# Patient Record
Sex: MALE | Race: WHITE | NOT HISPANIC OR LATINO | Employment: OTHER | URBAN - METROPOLITAN AREA
[De-identification: names, ages, dates, MRNs, and addresses within clinical notes are randomized per-mention and may not be internally consistent; named-entity substitution may affect disease eponyms.]

---

## 2023-03-31 DIAGNOSIS — F41.8 OTHER SPECIFIED ANXIETY DISORDERS: ICD-10-CM

## 2023-03-31 DIAGNOSIS — E78.2 MIXED HYPERLIPIDEMIA: ICD-10-CM

## 2023-03-31 RX ORDER — ATORVASTATIN CALCIUM 20 MG/1
TABLET, FILM COATED ORAL
Qty: 90 TABLET | Refills: 1 | Status: SHIPPED | OUTPATIENT
Start: 2023-03-31 | End: 2023-06-27 | Stop reason: SINTOL

## 2023-03-31 RX ORDER — SERTRALINE HYDROCHLORIDE 100 MG/1
TABLET, FILM COATED ORAL
Qty: 90 TABLET | Refills: 1 | Status: SHIPPED | OUTPATIENT
Start: 2023-03-31 | End: 2023-07-05

## 2023-06-19 DIAGNOSIS — I10 PRIMARY HYPERTENSION: Primary | ICD-10-CM

## 2023-06-19 RX ORDER — AMLODIPINE BESYLATE 5 MG/1
TABLET ORAL
Qty: 90 TABLET | Refills: 1 | Status: SHIPPED | OUTPATIENT
Start: 2023-06-19 | End: 2023-10-31 | Stop reason: SDUPTHER

## 2023-06-19 RX ORDER — LISINOPRIL 20 MG/1
TABLET ORAL
Qty: 90 TABLET | Refills: 1 | Status: SHIPPED | OUTPATIENT
Start: 2023-06-19 | End: 2024-02-01

## 2023-06-24 LAB — PROSTATE SPECIFIC AG (NG/ML) IN SER/PLAS: <0.1 NG/ML (ref 0–4)

## 2023-06-26 PROBLEM — E78.2 HYPERLIPIDEMIA, MIXED: Status: ACTIVE | Noted: 2023-06-26

## 2023-06-26 PROBLEM — I10 ESSENTIAL HYPERTENSION: Status: ACTIVE | Noted: 2023-06-26

## 2023-06-26 PROBLEM — T84.84XA PAIN DUE TO LEFT HIP JOINT PROSTHESIS (CMS-HCC): Status: ACTIVE | Noted: 2023-06-26

## 2023-06-26 PROBLEM — R97.20 ELEVATED PSA: Status: ACTIVE | Noted: 2023-06-26

## 2023-06-26 PROBLEM — Z96.642 PAIN DUE TO LEFT HIP JOINT PROSTHESIS (CMS-HCC): Status: ACTIVE | Noted: 2023-06-26

## 2023-06-26 PROBLEM — S12.9XXA CERVICAL SPINE FRACTURE (MULTI): Status: ACTIVE | Noted: 2023-06-26

## 2023-06-26 PROBLEM — N40.1 BENIGN PROSTATIC HYPERPLASIA WITH NOCTURIA: Status: ACTIVE | Noted: 2023-06-26

## 2023-06-26 PROBLEM — H61.21 IMPACTED CERUMEN OF RIGHT EAR: Status: ACTIVE | Noted: 2023-06-26

## 2023-06-26 PROBLEM — R53.1 WEAKNESS GENERALIZED: Status: ACTIVE | Noted: 2023-06-26

## 2023-06-26 PROBLEM — H91.90 DECREASED HEARING: Status: ACTIVE | Noted: 2023-06-26

## 2023-06-26 PROBLEM — E55.9 VITAMIN D DEFICIENCY: Status: ACTIVE | Noted: 2023-06-26

## 2023-06-26 PROBLEM — H91.91 HEARING LOSS OF RIGHT EAR: Status: ACTIVE | Noted: 2023-06-26

## 2023-06-26 PROBLEM — H90.3 SENSORINEURAL HEARING LOSS (SNHL) OF BOTH EARS: Status: ACTIVE | Noted: 2023-06-26

## 2023-06-26 PROBLEM — R29.898 WEAKNESS OF BOTH LOWER EXTREMITIES: Status: ACTIVE | Noted: 2023-06-26

## 2023-06-26 PROBLEM — F41.8 DEPRESSION WITH ANXIETY: Status: ACTIVE | Noted: 2023-06-26

## 2023-06-26 PROBLEM — R29.6 FREQUENT FALLS: Status: ACTIVE | Noted: 2023-06-26

## 2023-06-26 PROBLEM — I63.22: Status: ACTIVE | Noted: 2023-06-26

## 2023-06-26 PROBLEM — N18.31 STAGE 3A CHRONIC KIDNEY DISEASE (MULTI): Status: ACTIVE | Noted: 2023-06-26

## 2023-06-26 PROBLEM — R26.81 GAIT INSTABILITY: Status: ACTIVE | Noted: 2023-06-26

## 2023-06-26 PROBLEM — R35.1 BENIGN PROSTATIC HYPERPLASIA WITH NOCTURIA: Status: ACTIVE | Noted: 2023-06-26

## 2023-06-26 PROBLEM — M54.16 LUMBAR RADICULOPATHY: Status: ACTIVE | Noted: 2023-06-26

## 2023-06-26 PROBLEM — C44.90 SKIN CANCER: Status: ACTIVE | Noted: 2023-06-26

## 2023-06-26 PROBLEM — M54.50 LOW BACK PAIN: Status: ACTIVE | Noted: 2023-06-26

## 2023-06-26 RX ORDER — MIRABEGRON 25 MG/1
25 TABLET, FILM COATED, EXTENDED RELEASE ORAL DAILY
COMMUNITY
Start: 2023-03-29 | End: 2023-10-31 | Stop reason: ALTCHOICE

## 2023-06-26 RX ORDER — TAMSULOSIN HYDROCHLORIDE 0.4 MG/1
2 CAPSULE ORAL DAILY
COMMUNITY
Start: 2022-02-07 | End: 2023-07-05

## 2023-06-26 RX ORDER — TADALAFIL 5 MG/1
5 TABLET ORAL DAILY
COMMUNITY
Start: 2023-04-12 | End: 2023-10-31 | Stop reason: ALTCHOICE

## 2023-06-26 RX ORDER — LUTEIN 6 MG
1 TABLET ORAL DAILY
COMMUNITY
Start: 2022-12-07 | End: 2023-10-31 | Stop reason: ALTCHOICE

## 2023-06-26 RX ORDER — FOLIC ACID 0.8 MG
TABLET ORAL
COMMUNITY
End: 2023-10-31 | Stop reason: ALTCHOICE

## 2023-06-26 RX ORDER — ASPIRIN 81 MG/1
TABLET ORAL
COMMUNITY
End: 2023-10-31 | Stop reason: SDUPTHER

## 2023-06-27 ENCOUNTER — OFFICE VISIT (OUTPATIENT)
Dept: PRIMARY CARE | Facility: CLINIC | Age: 83
End: 2023-06-27
Payer: MEDICARE

## 2023-06-27 VITALS
HEART RATE: 77 BPM | TEMPERATURE: 97.3 F | HEIGHT: 69 IN | WEIGHT: 164 LBS | SYSTOLIC BLOOD PRESSURE: 130 MMHG | OXYGEN SATURATION: 97 % | DIASTOLIC BLOOD PRESSURE: 78 MMHG | BODY MASS INDEX: 24.29 KG/M2

## 2023-06-27 DIAGNOSIS — F41.8 DEPRESSION WITH ANXIETY: ICD-10-CM

## 2023-06-27 DIAGNOSIS — E78.2 HYPERLIPIDEMIA, MIXED: ICD-10-CM

## 2023-06-27 DIAGNOSIS — I10 ESSENTIAL HYPERTENSION: Primary | ICD-10-CM

## 2023-06-27 DIAGNOSIS — N18.31 STAGE 3A CHRONIC KIDNEY DISEASE (MULTI): ICD-10-CM

## 2023-06-27 DIAGNOSIS — F33.42 RECURRENT MAJOR DEPRESSIVE DISORDER, IN FULL REMISSION (CMS-HCC): ICD-10-CM

## 2023-06-27 DIAGNOSIS — D69.6 THROMBOCYTOPENIA, UNSPECIFIED (CMS-HCC): ICD-10-CM

## 2023-06-27 DIAGNOSIS — I63.22: ICD-10-CM

## 2023-06-27 PROBLEM — H61.21 IMPACTED CERUMEN OF RIGHT EAR: Status: RESOLVED | Noted: 2023-06-26 | Resolved: 2023-06-27

## 2023-06-27 PROBLEM — R53.1 WEAKNESS GENERALIZED: Status: RESOLVED | Noted: 2023-06-26 | Resolved: 2023-06-27

## 2023-06-27 PROBLEM — Z96.642 PAIN DUE TO LEFT HIP JOINT PROSTHESIS (CMS-HCC): Status: RESOLVED | Noted: 2023-06-26 | Resolved: 2023-06-27

## 2023-06-27 PROBLEM — T84.84XA PAIN DUE TO LEFT HIP JOINT PROSTHESIS (CMS-HCC): Status: RESOLVED | Noted: 2023-06-26 | Resolved: 2023-06-27

## 2023-06-27 PROBLEM — S12.9XXA CERVICAL SPINE FRACTURE (MULTI): Status: RESOLVED | Noted: 2023-06-26 | Resolved: 2023-06-27

## 2023-06-27 PROBLEM — M54.50 LOW BACK PAIN: Status: RESOLVED | Noted: 2023-06-26 | Resolved: 2023-06-27

## 2023-06-27 PROCEDURE — 1159F MED LIST DOCD IN RCRD: CPT | Performed by: INTERNAL MEDICINE

## 2023-06-27 PROCEDURE — 1160F RVW MEDS BY RX/DR IN RCRD: CPT | Performed by: INTERNAL MEDICINE

## 2023-06-27 PROCEDURE — 3075F SYST BP GE 130 - 139MM HG: CPT | Performed by: INTERNAL MEDICINE

## 2023-06-27 PROCEDURE — 99214 OFFICE O/P EST MOD 30 MIN: CPT | Performed by: INTERNAL MEDICINE

## 2023-06-27 PROCEDURE — 3078F DIAST BP <80 MM HG: CPT | Performed by: INTERNAL MEDICINE

## 2023-06-27 RX ORDER — PSYLLIUM HUSK 0.4 G
5 CAPSULE ORAL 4 TIMES DAILY
COMMUNITY

## 2023-06-27 ASSESSMENT — ENCOUNTER SYMPTOMS
DIZZINESS: 0
WEAKNESS: 1
HYPERTENSION: 1
FATIGUE: 1
SHORTNESS OF BREATH: 0
MYALGIAS: 1

## 2023-06-27 NOTE — ASSESSMENT & PLAN NOTE
Lipids, LDL at goal with statin.  Recommend low fat/cholesterol diet.  ? Side effects,weakness, muscle aches, cramps.  Stop x 30 days.  Call with progress.

## 2023-06-27 NOTE — PROGRESS NOTES
"Subjective   Patient ID: Levon Mckay is a 83 y.o. male who presents for chronic medical problems and Follow-up (4 month follow up with labs).    Completed XRT for prostate cancer, mets to right iliac bone.  Last lupron injection 2-7-2023.  PSA < 0.10.  Has fu with radiation oncology.  Still has urinary urgency, but no incontinence.  Bms improving, now formed and more regular.  Back to riding mower and lawn edging.  Has not been able to exercise.  Whole body feels weak.  ? Statin side effects.    Hypertension  This is a chronic problem. The current episode started more than 1 year ago. The problem has been resolved since onset. The problem is controlled. Pertinent negatives include no chest pain or shortness of breath. There are no associated agents to hypertension. The current treatment provides significant improvement. There are no compliance problems.    Hyperlipidemia  This is a chronic problem. The current episode started more than 1 year ago. The problem is controlled. Recent lipid tests were reviewed and are normal. There are no known factors aggravating his hyperlipidemia. Associated symptoms include myalgias. Pertinent negatives include no chest pain or shortness of breath. Current antihyperlipidemic treatment includes statins. The current treatment provides significant improvement of lipids. There are no compliance problems.         Review of Systems   Constitutional:  Positive for fatigue.   Respiratory:  Negative for shortness of breath.    Cardiovascular:  Negative for chest pain.   Musculoskeletal:  Positive for myalgias.   Neurological:  Positive for weakness. Negative for dizziness.       Objective   /78   Pulse 77   Temp 36.3 °C (97.3 °F)   Ht 1.753 m (5' 9\")   Wt 74.4 kg (164 lb)   SpO2 97%   BMI 24.22 kg/m²     Physical Exam  Constitutional:       Appearance: Normal appearance.   Cardiovascular:      Rate and Rhythm: Normal rate and regular rhythm.      Pulses: Normal pulses.      " Heart sounds: Normal heart sounds.   Pulmonary:      Effort: Pulmonary effort is normal.      Breath sounds: Normal breath sounds.   Neurological:      General: No focal deficit present.      Mental Status: He is alert.      Gait: Gait abnormal.   Psychiatric:         Mood and Affect: Mood normal.         Behavior: Behavior normal.         Thought Content: Thought content normal.         Judgment: Judgment normal.         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            Circulatory    Essential hypertension - Primary     BP at goal with current medications.  Rec low salt diet.  Check BP at home, goal < 140/90.           Relevant Orders    Basic metabolic panel       Genitourinary    Stage 3a chronic kidney disease       Hematologic    Thrombocytopenia, unspecified (CMS/HCC)    Relevant Medications    aspirin 81 mg EC tablet       Other    Depression with anxiety     Improved with sertraline.         Hyperlipidemia, mixed     Lipids, LDL at goal with statin.  Recommend low fat/cholesterol diet.  ? Side effects,weakness, muscle aches, cramps.  Stop x 30 days.  Call with progress.           Relevant Orders    Lipid panel    ALT    Recurrent major depressive disorder, in full remission (CMS/HCC)

## 2023-06-28 LAB — TESTOSTERONE,TOTAL,LC-MS/MS: 5 NG/DL (ref 250–1100)

## 2023-07-04 DIAGNOSIS — F41.8 OTHER SPECIFIED ANXIETY DISORDERS: ICD-10-CM

## 2023-07-04 DIAGNOSIS — E78.2 HYPERLIPIDEMIA, MIXED: Primary | ICD-10-CM

## 2023-07-04 DIAGNOSIS — R35.0 URINARY FREQUENCY: ICD-10-CM

## 2023-07-05 RX ORDER — TAMSULOSIN HYDROCHLORIDE 0.4 MG/1
CAPSULE ORAL
Qty: 90 CAPSULE | Refills: 1 | Status: SHIPPED | OUTPATIENT
Start: 2023-07-05 | End: 2023-12-19

## 2023-07-05 RX ORDER — SERTRALINE HYDROCHLORIDE 100 MG/1
TABLET, FILM COATED ORAL
Qty: 90 TABLET | Refills: 1 | Status: SHIPPED | OUTPATIENT
Start: 2023-07-05 | End: 2024-02-20

## 2023-07-05 RX ORDER — ATORVASTATIN CALCIUM 20 MG/1
TABLET, FILM COATED ORAL
Qty: 90 TABLET | Refills: 1 | Status: SHIPPED | OUTPATIENT
Start: 2023-07-05 | End: 2023-12-19

## 2023-09-20 LAB — PROSTATE SPECIFIC AG (NG/ML) IN SER/PLAS: <0.1 NG/ML (ref 0–4)

## 2023-09-25 LAB — TESTOSTERONE,TOTAL,LC-MS/MS: 9 NG/DL (ref 250–1100)

## 2023-10-23 DIAGNOSIS — R35.0 INCREASED URINARY FREQUENCY: Primary | ICD-10-CM

## 2023-10-25 ENCOUNTER — LAB (OUTPATIENT)
Dept: LAB | Facility: LAB | Age: 83
End: 2023-10-25
Payer: MEDICARE

## 2023-10-25 DIAGNOSIS — R35.0 INCREASED URINARY FREQUENCY: ICD-10-CM

## 2023-10-25 DIAGNOSIS — I10 ESSENTIAL HYPERTENSION: ICD-10-CM

## 2023-10-25 DIAGNOSIS — E78.2 HYPERLIPIDEMIA, MIXED: ICD-10-CM

## 2023-10-25 LAB
APPEARANCE UR: CLEAR
BILIRUB UR STRIP.AUTO-MCNC: NEGATIVE MG/DL
COLOR UR: YELLOW
GLUCOSE UR STRIP.AUTO-MCNC: NEGATIVE MG/DL
HOLD SPECIMEN: NORMAL
HYALINE CASTS #/AREA URNS AUTO: ABNORMAL /LPF
KETONES UR STRIP.AUTO-MCNC: NEGATIVE MG/DL
LEUKOCYTE ESTERASE UR QL STRIP.AUTO: NEGATIVE
MUCOUS THREADS #/AREA URNS AUTO: ABNORMAL /LPF
NITRITE UR QL STRIP.AUTO: NEGATIVE
PH UR STRIP.AUTO: 5 [PH]
PROT UR STRIP.AUTO-MCNC: ABNORMAL MG/DL
RBC # UR STRIP.AUTO: NEGATIVE /UL
RBC #/AREA URNS AUTO: ABNORMAL /HPF
SP GR UR STRIP.AUTO: 1.02
UROBILINOGEN UR STRIP.AUTO-MCNC: <2 MG/DL
WBC #/AREA URNS AUTO: ABNORMAL /HPF

## 2023-10-25 PROCEDURE — 36415 COLL VENOUS BLD VENIPUNCTURE: CPT

## 2023-10-25 PROCEDURE — 81001 URINALYSIS AUTO W/SCOPE: CPT

## 2023-10-25 PROCEDURE — 80061 LIPID PANEL: CPT

## 2023-10-25 PROCEDURE — 80048 BASIC METABOLIC PNL TOTAL CA: CPT

## 2023-10-25 PROCEDURE — 84460 ALANINE AMINO (ALT) (SGPT): CPT

## 2023-10-26 LAB
ALT SERPL W P-5'-P-CCNC: 13 U/L (ref 10–52)
ANION GAP SERPL CALC-SCNC: 13 MMOL/L (ref 10–20)
BUN SERPL-MCNC: 39 MG/DL (ref 6–23)
CALCIUM SERPL-MCNC: 10 MG/DL (ref 8.6–10.6)
CHLORIDE SERPL-SCNC: 105 MMOL/L (ref 98–107)
CHOLEST SERPL-MCNC: 242 MG/DL (ref 0–199)
CHOLESTEROL/HDL RATIO: 5.3
CO2 SERPL-SCNC: 28 MMOL/L (ref 21–32)
CREAT SERPL-MCNC: 1.92 MG/DL (ref 0.5–1.3)
GFR SERPL CREATININE-BSD FRML MDRD: 34 ML/MIN/1.73M*2
GLUCOSE SERPL-MCNC: 103 MG/DL (ref 74–99)
HDLC SERPL-MCNC: 45.9 MG/DL
LDLC SERPL CALC-MCNC: 172 MG/DL
NON HDL CHOLESTEROL: 196 MG/DL (ref 0–149)
POTASSIUM SERPL-SCNC: 4.5 MMOL/L (ref 3.5–5.3)
SODIUM SERPL-SCNC: 141 MMOL/L (ref 136–145)
TRIGL SERPL-MCNC: 121 MG/DL (ref 0–149)
VLDL: 24 MG/DL (ref 0–40)

## 2023-10-31 ENCOUNTER — OFFICE VISIT (OUTPATIENT)
Dept: PRIMARY CARE | Facility: CLINIC | Age: 83
End: 2023-10-31
Payer: MEDICARE

## 2023-10-31 VITALS
HEIGHT: 72 IN | DIASTOLIC BLOOD PRESSURE: 76 MMHG | HEART RATE: 90 BPM | WEIGHT: 163 LBS | BODY MASS INDEX: 22.08 KG/M2 | OXYGEN SATURATION: 97 % | SYSTOLIC BLOOD PRESSURE: 128 MMHG

## 2023-10-31 DIAGNOSIS — G31.9 DEGENERATIVE DISEASE OF NERVOUS SYSTEM, UNSPECIFIED (CMS-HCC): ICD-10-CM

## 2023-10-31 DIAGNOSIS — N18.32 STAGE 3B CHRONIC KIDNEY DISEASE (MULTI): ICD-10-CM

## 2023-10-31 DIAGNOSIS — C61 PROSTATE CANCER (MULTI): ICD-10-CM

## 2023-10-31 DIAGNOSIS — I10 ESSENTIAL HYPERTENSION: Primary | ICD-10-CM

## 2023-10-31 DIAGNOSIS — E78.2 HYPERLIPIDEMIA, MIXED: ICD-10-CM

## 2023-10-31 PROBLEM — R51.9 HEADACHE: Status: ACTIVE | Noted: 2023-10-31

## 2023-10-31 PROBLEM — U07.1 COVID-19: Status: ACTIVE | Noted: 2023-10-31

## 2023-10-31 PROBLEM — L60.0 INGROWN TOENAIL: Status: ACTIVE | Noted: 2023-10-31

## 2023-10-31 PROBLEM — M72.0 DUPUYTREN'S CONTRACTURE OF RIGHT HAND: Status: ACTIVE | Noted: 2023-10-31

## 2023-10-31 PROBLEM — N18.30 CHRONIC KIDNEY DISEASE, STAGE 3 (MULTI): Status: ACTIVE | Noted: 2023-06-26

## 2023-10-31 PROBLEM — M79.676 TOE PAIN: Status: ACTIVE | Noted: 2023-10-31
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
## 2023-10-31 PROBLEM — R53.83 FATIGUE: Status: ACTIVE | Noted: 2023-06-26

## 2023-10-31 PROBLEM — E78.5 DYSLIPIDEMIA: Status: ACTIVE | Noted: 2023-10-31

## 2023-10-31 PROCEDURE — 1159F MED LIST DOCD IN RCRD: CPT | Performed by: INTERNAL MEDICINE

## 2023-10-31 PROCEDURE — 3078F DIAST BP <80 MM HG: CPT | Performed by: INTERNAL MEDICINE

## 2023-10-31 PROCEDURE — 99214 OFFICE O/P EST MOD 30 MIN: CPT | Performed by: INTERNAL MEDICINE

## 2023-10-31 PROCEDURE — 1160F RVW MEDS BY RX/DR IN RCRD: CPT | Performed by: INTERNAL MEDICINE

## 2023-10-31 PROCEDURE — 3074F SYST BP LT 130 MM HG: CPT | Performed by: INTERNAL MEDICINE

## 2023-10-31 RX ORDER — TAMSULOSIN HYDROCHLORIDE 0.4 MG/1
1 CAPSULE ORAL DAILY
COMMUNITY
Start: 2022-02-07 | End: 2023-10-31 | Stop reason: ALTCHOICE

## 2023-10-31 RX ORDER — AMLODIPINE BESYLATE 5 MG/1
1 TABLET ORAL DAILY
COMMUNITY
Start: 2022-12-12

## 2023-10-31 ASSESSMENT — ENCOUNTER SYMPTOMS
HYPERTENSION: 1
SHORTNESS OF BREATH: 0
MYALGIAS: 0
FREQUENCY: 1
DIZZINESS: 0

## 2023-10-31 NOTE — PROGRESS NOTES
Subjective   Patient ID: Levon Mckay is a 83 y.o. male who presents for Follow-up chronic medical problems.    Problems with urinary flow and right hip pain since XRT.  Nocturia 9-10 times now.  UA negative with urology.  No problems recently with hip pain.  Feels cold frequently.  May be weaker but active in yard.  PSA monitored every 3 months.  PSA < 0.10 and T level 9.  Balance problems off and on.  Ambulates with cane.  Pt manages his meds.  Will miss pm doses frequently, 2 nights a week.  Meds and labs reviewed.        Hypertension  This is a chronic problem. The current episode started more than 1 year ago. The problem has been resolved since onset. The problem is controlled. Pertinent negatives include no chest pain or shortness of breath. There are no associated agents to hypertension. The current treatment provides significant improvement. There are no compliance problems.    Hyperlipidemia  This is a chronic problem. The current episode started more than 1 year ago. The problem is controlled. Recent lipid tests were reviewed and are low. There are no known factors aggravating his hyperlipidemia. Pertinent negatives include no chest pain, myalgias or shortness of breath. Current antihyperlipidemic treatment includes statins. The current treatment provides no improvement of lipids.        Review of Systems   Respiratory:  Negative for shortness of breath.    Cardiovascular:  Negative for chest pain.   Genitourinary:  Positive for frequency.   Musculoskeletal:  Negative for myalgias.   Neurological:  Negative for dizziness.       Objective   /76 (BP Location: Right arm, Patient Position: Sitting)   Pulse 90   Ht 1.829 m (6')   Wt 73.9 kg (163 lb)   SpO2 97%   BMI 22.11 kg/m²     Physical Exam  Constitutional:       Appearance: Normal appearance.   Neurological:      General: No focal deficit present.      Mental Status: He is alert.      Gait: Gait abnormal.   Psychiatric:         Mood and  Affect: Mood normal.         Behavior: Behavior normal.         Thought Content: Thought content normal.         Judgment: Judgment normal.       Assessment/Plan   Problem List Items Addressed This Visit             ICD-10-CM    Essential hypertension - Primary I10     BP at goal with current medications.  Rec low salt diet.  Check BP at home, goal < 140/90.           Hyperlipidemia, mixed E78.2     Lipids, LDL not at goal.  Discussed compliance with statin.  Recommend low fat/cholesterol diet.           Chronic kidney disease, stage 3 (CMS/HCC) N18.30     GFR down to 34.  Pt has fu with urology.  ? Change in PVR.         Relevant Orders    Basic metabolic panel    Prostate cancer (CMS/HCC) C61     PSA < 0.10.  Followed with urology, oncology and radiation oncology.         Degenerative disease of nervous system, unspecified (CMS/HCC) G31.9     CVA in past.

## 2023-10-31 NOTE — ASSESSMENT & PLAN NOTE
Lipids, LDL not at goal.  Discussed compliance with statin.  Recommend low fat/cholesterol diet.

## 2023-11-06 ENCOUNTER — OFFICE VISIT (OUTPATIENT)
Dept: UROLOGY | Facility: HOSPITAL | Age: 83
End: 2023-11-06
Payer: MEDICARE

## 2023-11-06 DIAGNOSIS — R35.1 BENIGN PROSTATIC HYPERPLASIA WITH NOCTURIA: Primary | ICD-10-CM

## 2023-11-06 DIAGNOSIS — N18.31 STAGE 3A CHRONIC KIDNEY DISEASE (MULTI): ICD-10-CM

## 2023-11-06 DIAGNOSIS — N40.1 BENIGN PROSTATIC HYPERPLASIA WITH NOCTURIA: Primary | ICD-10-CM

## 2023-11-06 DIAGNOSIS — C61 PROSTATE CANCER (MULTI): ICD-10-CM

## 2023-11-06 LAB
POC APPEARANCE, URINE: CLEAR
POC BILIRUBIN, URINE: NEGATIVE
POC BLOOD, URINE: ABNORMAL
POC COLOR, URINE: YELLOW
POC GLUCOSE, URINE: NEGATIVE MG/DL
POC KETONES, URINE: NEGATIVE MG/DL
POC LEUKOCYTES, URINE: NEGATIVE
POC NITRITE,URINE: NEGATIVE
POC PH, URINE: 5.5 PH
POC PROTEIN, URINE: ABNORMAL MG/DL
POC SPECIFIC GRAVITY, URINE: 1.02
POC UROBILINOGEN, URINE: 0.2 EU/DL

## 2023-11-06 PROCEDURE — 1160F RVW MEDS BY RX/DR IN RCRD: CPT | Performed by: STUDENT IN AN ORGANIZED HEALTH CARE EDUCATION/TRAINING PROGRAM

## 2023-11-06 PROCEDURE — 1126F AMNT PAIN NOTED NONE PRSNT: CPT | Performed by: STUDENT IN AN ORGANIZED HEALTH CARE EDUCATION/TRAINING PROGRAM

## 2023-11-06 PROCEDURE — 3078F DIAST BP <80 MM HG: CPT | Performed by: STUDENT IN AN ORGANIZED HEALTH CARE EDUCATION/TRAINING PROGRAM

## 2023-11-06 PROCEDURE — 99213 OFFICE O/P EST LOW 20 MIN: CPT | Performed by: STUDENT IN AN ORGANIZED HEALTH CARE EDUCATION/TRAINING PROGRAM

## 2023-11-06 PROCEDURE — 3074F SYST BP LT 130 MM HG: CPT | Performed by: STUDENT IN AN ORGANIZED HEALTH CARE EDUCATION/TRAINING PROGRAM

## 2023-11-06 PROCEDURE — 1159F MED LIST DOCD IN RCRD: CPT | Performed by: STUDENT IN AN ORGANIZED HEALTH CARE EDUCATION/TRAINING PROGRAM

## 2023-11-06 NOTE — PROGRESS NOTES
UROLOGIC INITIAL EVALUATION       PROBLEM LIST:  1. Benign prostatic hyperplasia with nocturia  Measure post void residual    POCT UA (nonautomated) manually resulted      2. Prostate cancer (CMS/[facility])        3. Stage 3a chronic kidney disease (CMS/[facility])             HISTORY OF PRESENT ILLNESS:   Levon Mckay is a 83 y.o. male who was first seen on 11/6/2023 for complaints of increased urinary frequency and urgency, getting up 10+ times/night. He was urinating 2-3 times daily prior.  He takes 0.4-0.8mg tamsulosin daily.  Patient report no change in symptoms when he does take the Flomax. His PVR today was 46cc. He does consume mostly tea and coffee in the morning and afternoon, and does not drink fluids close to bedtime.     PAST MEDICAL HISTORY:  No past medical history on file.    PAST SURGICAL HISTORY:  Past Surgical History:   Procedure Laterality Date    OTHER SURGICAL HISTORY  06/28/2022    Appendectomy    OTHER SURGICAL HISTORY  06/28/2022    Hip surgery    OTHER SURGICAL HISTORY  12/07/2022    Tonsillectomy with adenoidectomy        ALLERGIES:   Allergies   Allergen Reactions    Other Unknown        MEDICATIONS:     Current Outpatient Medications:     amLODIPine (Norvasc) 5 mg tablet, Take 1 tablet (5 mg) by mouth once daily., Disp: , Rfl:     aspirin 81 mg capsule, 1 capsule once every 24 hours., Disp: , Rfl:     atorvastatin (Lipitor) 20 mg tablet, TAKE 1 TABLET BY MOUTH ONCE  DAILY AS DIRECTED, Disp: 90 tablet, Rfl: 1    lisinopril 20 mg tablet, TAKE 1 TABLET BY MOUTH DAILY, Disp: 90 tablet, Rfl: 1    psyllium (Metamucil) 0.4 gram capsule, Take 5 capsules by mouth 4 times a day., Disp: , Rfl:     sertraline (Zoloft) 100 mg tablet, TAKE 1 TABLET BY MOUTH DAILY, Disp: 90 tablet, Rfl: 1    tamsulosin (Flomax) 0.4 mg 24 hr capsule, TAKE 1 CAPSULE BY MOUTH DAILY, Disp: 90 capsule, Rfl: 1      SOCIAL HISTORY:  Patient  reports that he has never smoked. He does not have any smokeless tobacco history on file. He  reports that he does not currently use alcohol. He reports that he does not use drugs.   Social History     Socioeconomic History    Marital status: Single     Spouse name: Not on file    Number of children: Not on file    Years of education: Not on file    Highest education level: Not on file   Occupational History    Not on file   Tobacco Use    Smoking status: Never    Smokeless tobacco: Not on file   Substance and Sexual Activity    Alcohol use: Not Currently    Drug use: Never    Sexual activity: Not on file   Other Topics Concern    Not on file   Social History Narrative    Not on file     Social Determinants of Health     Financial Resource Strain: Not on file   Food Insecurity: Not on file   Transportation Needs: Not on file   Physical Activity: Not on file   Stress: Not on file   Social Connections: Not on file   Intimate Partner Violence: Not on file   Housing Stability: Not on file       FAMILY HISTORY:  No family history on file.    REVIEW OF SYSTEMS:  Constitutional: Negative for fever and chills. Denies anorexia, weight loss.  Eyes: Negative for visual disturbance.   Respiratory: Negative for shortness of breath.    Cardiovascular: Negative for chest pain.   Gastrointestinal: Negative for nausea and vomiting.   Genitourinary: See interval history above.  Skin: Negative for rash.   Neurological: Negative for dizziness and numbness.   Psychiatric/Behavioral: Negative for confusion and decreased concentration.     PHYSICAL EXAM:  There were no vitals taken for this visit.  Constitutional: Patient appears well-developed and well-nourished. No distress.    Head: Normocephalic and atraumatic.    Neck: Normal range of motion.    Cardiovascular: Normal rate.    Pulmonary/Chest: Effort normal. No respiratory distress.   Abdominal: soft ntnd  Musculoskeletal: Normal range of motion.    Neurological: Alert and oriented to person, place, and time.  Psychiatric: Normal mood and affect. Behavior is normal. Thought  content normal.      Lab Results   Component Value Date    BUN 39 (H) 10/25/2023    CREATININE 1.92 (H) 10/25/2023    EGFR 34 (L) 10/25/2023     10/25/2023    K 4.5 10/25/2023     10/25/2023    CO2 28 10/25/2023    CALCIUM 10.0 10/25/2023      Lab Results   Component Value Date    WBC 5.8 01/05/2023    RBC 4.10 (L) 01/05/2023    HGB 13.3 (L) 01/05/2023    HCT 40.8 (L) 01/05/2023     01/05/2023    MCH 32.4 12/06/2022    MCHC 32.6 01/05/2023    RDW 12.6 01/05/2023     (L) 01/05/2023    MPV 10.6 12/06/2022        Lab Results   Component Value Date    PSA <0.10 09/19/2023    PSA <0.10 06/23/2023    PSA 8.91 (H) 03/17/2023    PSA 12.31 (H) 01/24/2023    PSA 15.9 (H) 07/05/2022       Assessment:     1. Benign prostatic hyperplasia with nocturia  Measure post void residual    POCT UA (nonautomated) manually resulted      2. Prostate cancer (CMS/HCC)        3. Stage 3a chronic kidney disease (CMS/HCC)              Plan:    Discussed bladder and voiding physiology    We reviewed patient's voiding sx and discussed starting trospium, pvr today was 43cc   Discussed proceeding with UDS if no improvement   Rtc in 4 weeks    31 minutes total spent on patient's care today; >50% time spent on counseling/coordination of care

## 2023-11-09 DIAGNOSIS — N39.41 URGE INCONTINENCE OF URINE: Primary | ICD-10-CM

## 2023-11-09 RX ORDER — TROSPIUM CHLORIDE 20 MG/1
20 TABLET, FILM COATED ORAL 2 TIMES DAILY
Qty: 60 TABLET | Refills: 11 | Status: SHIPPED | OUTPATIENT
Start: 2023-11-09 | End: 2023-12-06 | Stop reason: SDUPTHER

## 2023-11-21 ENCOUNTER — APPOINTMENT (OUTPATIENT)
Dept: UROLOGY | Facility: CLINIC | Age: 83
End: 2023-11-21
Payer: MEDICARE

## 2023-12-04 ENCOUNTER — LAB (OUTPATIENT)
Dept: LAB | Facility: LAB | Age: 83
End: 2023-12-04
Payer: MEDICARE

## 2023-12-04 DIAGNOSIS — N18.32 STAGE 3B CHRONIC KIDNEY DISEASE (MULTI): ICD-10-CM

## 2023-12-04 LAB
ANION GAP SERPL CALC-SCNC: 13 MMOL/L (ref 10–20)
BUN SERPL-MCNC: 27 MG/DL (ref 6–23)
CALCIUM SERPL-MCNC: 9.5 MG/DL (ref 8.6–10.6)
CHLORIDE SERPL-SCNC: 106 MMOL/L (ref 98–107)
CO2 SERPL-SCNC: 28 MMOL/L (ref 21–32)
CREAT SERPL-MCNC: 1.6 MG/DL (ref 0.5–1.3)
GFR SERPL CREATININE-BSD FRML MDRD: 43 ML/MIN/1.73M*2
GLUCOSE SERPL-MCNC: 95 MG/DL (ref 74–99)
POTASSIUM SERPL-SCNC: 4.6 MMOL/L (ref 3.5–5.3)
SODIUM SERPL-SCNC: 142 MMOL/L (ref 136–145)

## 2023-12-04 PROCEDURE — 36415 COLL VENOUS BLD VENIPUNCTURE: CPT

## 2023-12-04 PROCEDURE — 80048 BASIC METABOLIC PNL TOTAL CA: CPT

## 2023-12-05 NOTE — PROGRESS NOTES
Subjective   Patient ID: Levon Mckay is a 83 y.o. male presenting for PVR check and medication response     HPI  Last visit with Dr. Escobedo 11/06/2023. History of BPH wth Nocturia, Prostate Cancer s/p EBRT and Leuprolide injection  CKD3, Urinary frequency, and urgency. Symptoms have improved significantly since starting Trospium last visit.   NTF was up to 10x, now NTF 1 to 3x. Daytime frequency not bothersome. Also taking Tamsulosin 0.4 mg daily   He drinks mostly tea and coffee in the morning and afternoon, limits fluids in the evening     Urinalysis today: unable to void. Emptied bladder prior to appt.   PVR 27 ml       Lab Results   Component Value Date    PSA <0.10 09/19/2023    PSA <0.10 06/23/2023    PSA 8.91 (H) 03/17/2023    PSA 12.31 (H) 01/24/2023    PSA 15.9 (H) 07/05/2022     Review of Systems  All other systems have been reviewed and are negative for complaint.    Objective   Physical Exam  General: Well developed, well nourished, alert and cooperative, appears in no acute distress  Eyes: Non-injected conjunctiva, sclera clear, no proptosis  Cardiac: Extremities are warm and well perfused. No edema, cyanosis or pallor.   Lungs: Breathing is easy, non-labored. Speaking in clear and complete sentences. Normal diaphragmatic movement.  MSK: Ambulatory with steady gait, unassisted  Neuro: alert and oriented to person, place and time  Psych: Demonstrates good judgement and reason, without hallucinations, abnormal affect or abnormal behaviors.  Skin: no obvious lesions, no rashes.    Assessment/Plan   Diagnoses and all orders for this visit:  Benign prostatic hyperplasia with nocturia  Prostate cancer (CMS/HCC)      We discussed the pathophysiology of overactive bladder. We discussed possible treatment options including doing conservative voiding techniques, medications, and surgical options.. He was counseled regarding bladder retraining, diet choices, and fluid restriction.     Patient was informed that  first line treatment is behavioral therapy. This includes:   -Fluid balancing and sometimes restriction   -Reducing caffeine or other bladder stimulants   -Bladder retraining  -Delayed voiding to help defer the overwhelming urge  -Avoid constipation  -Avoid activities that exacerbate incontinence       -Kegel exercises and pelvic floor muscle training     I educated the patient on relevant lower urinary tract anatomy and physiology with emphasis on differential diagnosis as well as contributing factors to the patient's lower urinary tract symptoms. I educated the patient on dietary and behavioral modifications pertinent to the patient's complaints. I recommended to avoid caffeine, alcohol, spicy and acidic oral intake and to regulate fluid intake and voiding (timed voiding, double voiding). I stressed the importance of avoiding constipation and recommended stool softeners unless diarrhea present. We discussed limiting fluid intake at night and elevating legs prior to bedtime.      Patient will follow up in 6 months with PSA prior.     All questions and concerns were addressed. Patient verbalizes understanding and has no other questions at this time.   If you have any questions about your care, do not hesitate to call and leave a message, we return calls in a timely manner.    Emerita Condon-- JOHANA MOLINA  Office Phone:  [phone]

## 2023-12-06 ENCOUNTER — OFFICE VISIT (OUTPATIENT)
Dept: UROLOGY | Facility: HOSPITAL | Age: 83
End: 2023-12-06
Payer: MEDICARE

## 2023-12-06 DIAGNOSIS — N39.41 URGE INCONTINENCE OF URINE: ICD-10-CM

## 2023-12-06 DIAGNOSIS — C61 PROSTATE CANCER (MULTI): ICD-10-CM

## 2023-12-06 DIAGNOSIS — N40.1 BENIGN PROSTATIC HYPERPLASIA WITH NOCTURIA: Primary | ICD-10-CM

## 2023-12-06 DIAGNOSIS — R35.1 BENIGN PROSTATIC HYPERPLASIA WITH NOCTURIA: Primary | ICD-10-CM

## 2023-12-06 PROCEDURE — 1160F RVW MEDS BY RX/DR IN RCRD: CPT | Performed by: NURSE PRACTITIONER

## 2023-12-06 PROCEDURE — 51798 US URINE CAPACITY MEASURE: CPT | Performed by: NURSE PRACTITIONER

## 2023-12-06 PROCEDURE — 1159F MED LIST DOCD IN RCRD: CPT | Performed by: NURSE PRACTITIONER

## 2023-12-06 PROCEDURE — 99213 OFFICE O/P EST LOW 20 MIN: CPT | Performed by: NURSE PRACTITIONER

## 2023-12-06 RX ORDER — TROSPIUM CHLORIDE 20 MG/1
20 TABLET, FILM COATED ORAL 2 TIMES DAILY
Qty: 180 TABLET | Refills: 3 | Status: SHIPPED | OUTPATIENT
Start: 2023-12-06 | End: 2024-01-09 | Stop reason: WASHOUT

## 2023-12-08 ENCOUNTER — APPOINTMENT (OUTPATIENT)
Dept: OTOLARYNGOLOGY | Facility: CLINIC | Age: 83
End: 2023-12-08
Payer: MEDICARE

## 2023-12-18 DIAGNOSIS — R35.0 URINARY FREQUENCY: ICD-10-CM

## 2023-12-18 DIAGNOSIS — E78.2 HYPERLIPIDEMIA, MIXED: ICD-10-CM

## 2023-12-19 RX ORDER — TAMSULOSIN HYDROCHLORIDE 0.4 MG/1
CAPSULE ORAL
Qty: 90 CAPSULE | Refills: 3 | Status: SHIPPED | OUTPATIENT
Start: 2023-12-19

## 2023-12-19 RX ORDER — ATORVASTATIN CALCIUM 20 MG/1
TABLET, FILM COATED ORAL
Qty: 90 TABLET | Refills: 3 | Status: SHIPPED | OUTPATIENT
Start: 2023-12-19

## 2023-12-21 ENCOUNTER — LAB (OUTPATIENT)
Dept: LAB | Facility: LAB | Age: 83
End: 2023-12-21
Payer: MEDICARE

## 2023-12-21 DIAGNOSIS — C61 PROSTATE CANCER (MULTI): ICD-10-CM

## 2023-12-21 PROCEDURE — 36415 COLL VENOUS BLD VENIPUNCTURE: CPT

## 2023-12-21 PROCEDURE — 84153 ASSAY OF PSA TOTAL: CPT

## 2023-12-21 PROCEDURE — 84403 ASSAY OF TOTAL TESTOSTERONE: CPT

## 2023-12-22 LAB
PSA SERPL-MCNC: <0.1 NG/ML
TESTOST SERPL-MCNC: 856 NG/DL (ref 240–1000)

## 2023-12-26 NOTE — PROGRESS NOTES
Patient ID: Levon Mckay is a 83 y.o. male.  Diagnosis:  Prostate Cancer   [facility] Dr[facility] Geoff Peac[facility]: Dr. Caballero  Urology: Dr. Escobedo     Patient Care Team:  Ilir Bear MD as PCP - General  Il[facility] Bear [facility]  JESSE Dwyer-CNP as Nurse Practitioner (Hematology and Oncology)  Raymon Tellez MD as Medical Oncologist (Hematology and Oncology)    Current Therapy: Surveillance     ONCOLOGIC HISTORY    6/28/22 Elevated PSA of 12.61   7/5/22 PSA rechecked 15.9.   11/2/22 MRI prostate PIRADS 5   12/20/22 Biopsy (Cintia 7 (3+4) disease, up to 70% of tissue involved in tumor)   1/2023 - PSMA reveals R iliac bone met  2/7/23 - ADT 6 months started. plan for XRT prostate and SBRT iliac bone  3/14/23 - XRT started  6/29/23 - PSA < 0.1 T < 10  --  82-year-old gentleman with newly diagnosed prostate cancer, cT1c, GG2 (50% of cores+, 1/1+ targeted), iPSA 15.9.  11/2/2022 MRI prostate noted a 31.6 cc gland, PI-RADS 5 lesion in the left PZ posteriorly at mid gland with extension into the left TZ.  Broad capsular abutment with focal bulging and capsular irregularity, but no gross RUCHI.  No evidence of SV invasion.  No lymphadenopathy.  12/20/2022 transperineal biopsy noted Bybee 3+4 in 50% of the cores, Bybee 3+4 in 1/1 targeted core.  Cintia 4 involvement ranges 5 to 15%.  No evidence of intraductal carcinoma or cribriform pattern.    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        Past Medical History: Levon has no past medical history on file.  Surgical History:  Levon has a past surgical history that includes Other surgical history (06/28/2022); Other surgical history (06/28/2022); and Other surgical history (12/07/2022).  Social History:  Levon reports that he has  never smoked. He does not have any smokeless tobacco history on file. He reports that he does not currently use alcohol. He reports that he does not use drugs.  Family History:  No family history on file.  Family Oncology History:  Cancer-related family history is not on file.    SUBJECTIVE:    History of Present Illness:  Levon Mckay is a 83 y.o. male who presents today for follow up of prostate cancer. Patient of Dr. Tellez currently on surveillance. He has had no changes to his medical history since our last visit. Is feeling well. Wants to get back to his exercise plan. He was happy to hear his T was now normal. He was started on Tropsium by Dr. Escobedo and that has improved his urinary symptoms and in turn is helping with his sleep disturbances. His bowels are still different since XRT. He did not have a [employer]. Has been having heart burn. Having memory issues, may be worse than before treatment. Possible MCI. PCP wanted to evaluate for MCI.    Review of Systems   Constitutional:  Negative for appetite change, fatigue, fever and unexpected weight change.   HENT:  Negative.     Eyes: Negative.    Respiratory: Negative.     Cardiovascular: Negative.    Gastrointestinal:         Heartburn   Endocrine: Negative for hot flashes.   Genitourinary:  Positive for frequency. Negative for difficulty urinating and hematuria.    Musculoskeletal:  Positive for gait problem.   Skin: Negative.    Neurological:  Positive for extremity weakness and gait problem.   Hematological: Negative.    Psychiatric/Behavioral:  Positive for decreased concentration and sleep disturbance.      Allergies  Allergies   Allergen Reactions    Other Unknown      Medications  Current Outpatient Medications   Medication Instructions    amLODIPine (Norvasc) 5 mg tablet 1 tablet, oral, Daily    aspirin 81 mg capsule 1 capsule, Every 24 hours    atorvastatin (Lipitor) 20 mg tablet TAKE 1 TABLET BY MOUTH ONCE  DAILY AS DIRECTED    lisinopril 20 mg  tablet TAKE 1 TABLET BY MOUTH DAILY    psyllium (Metamucil) 0.4 gram capsule 5 capsules, oral, 4 times daily    sertraline (Zoloft) 100 mg tablet TAKE 1 TABLET BY MOUTH DAILY    tamsulosin (Flomax) 0.4 mg 24 hr capsule TAKE 1 CAPSULE BY MOUTH DAILY    trospium (SANCTURA) 20 mg, oral, 2 times daily        OBJECTIVE:    VS / Pain:  /90 (BP Location: Right arm, Patient Position: Sitting, BP Cuff Size: Adult)   Pulse 85   Temp 36.3 °C (97.3 °F) (Core)   Resp 18   Wt 75 kg (165 lb 5.5 oz)   SpO2 96%   BMI 22.42 kg/m²   BSA: 1.95 meters squared  Wt Readings from Last 5 Encounters:   12/28/23 75 kg (165 lb 5.5 oz)   10/31/23 73.9 kg (163 lb)   07/06/23 74.8 kg (165 lb)   06/29/23 74.5 kg (164 lb 3.9 oz)   06/27/23 74.4 kg (164 lb)      Pain Scale: 0  Physical Exam  Constitutional:       Appearance: Normal appearance.   HENT:      Head: Normocephalic and atraumatic.      Mouth/Throat:      Mouth: Mucous membranes are moist.      Pharynx: Oropharynx is clear.   Eyes:      Pupils: Pupils are equal, round, and reactive to light.   Pulmonary:      Effort: Pulmonary effort is normal.   Musculoskeletal:         General: Normal range of motion.   Skin:     General: Skin is warm and dry.   Neurological:      General: No focal deficit present.      Mental Status: He is alert and oriented to person, place, and time.   Psychiatric:         Mood and Affect: Mood normal.         Behavior: Behavior normal.         Thought Content: Thought content normal.         Judgment: Judgment normal.       Performance Status:  ECOG Score: 1- Restricted in physically strenuous activity.  Carries out light duty.  Karnofsky Score: 70 - Cares for self; unable to carry on normal activity or do normal work   Diagnostic Results   No results found for this or any previous visit (from the past 96 hour(s)).  Lab Results   Component Value Date    PSA <0.10 12/21/2023    PSA <0.10 09/19/2023    PSA <0.10 06/23/2023     Lab Results   Component Value  Date    TESTOSTERONE 856 12/21/2023       Assessment/Plan     83 yo  man (PMH stroke 20y ago) ECOG 2, diagnosed with int risk PC (iPSA 15 / Gl 7) with oligmet bone R iliac bone. 6 months ADT and XRt prostate and bone completed with undetectable PSA / low T levels.   Again, with M1-directed therapy and short term ADT, given age / comorbidities we are comfortable holding therapy and monitor and see if we can offer remission to him. PSA/T in 3 months    T has now recovered to 856. PSA remains undetectable.     No matching staging information was found for the patient.  Levon was seen today for follow-up.  Diagnoses and all orders for this visit:  Prostate cancer (CMS/[facility]) (Primary)  -     Prostate Specific Antigen; Future  -     Testosterone; Future  -     Prostate Specific Antigen; Future  -     Testosterone; Future    Treatment Plan:  [No matching plan found]  - PSA and T in 3 months  - Healthy lifestyle  - Try Pepcid for heartburn   - Puzzles for memory - follow up with PCP for MCI testing  - PCP for health maintenance  - Urology follow up as needed     Follow up in 3 months via phone to check PSA and T.     Patient verbalizes understanding of above plan. Time provided for patient's questions. All questions answered to patient's satisfaction in office. Patient instructed to reach out for any new concerning issues at [phone].    Lorena Platt MSN, APRN, A-GNP-C, OCN   Oncology   [facility]  [address] [place] [id number]   Epic Secure Chat   Phone: [phone]  [email or web address]

## 2023-12-28 ENCOUNTER — OFFICE VISIT (OUTPATIENT)
Dept: HEMATOLOGY/ONCOLOGY | Facility: CLINIC | Age: 83
End: 2023-12-28
Payer: MEDICARE

## 2023-12-28 VITALS
RESPIRATION RATE: 18 BRPM | TEMPERATURE: 97.3 F | HEART RATE: 85 BPM | SYSTOLIC BLOOD PRESSURE: 172 MMHG | DIASTOLIC BLOOD PRESSURE: 90 MMHG | OXYGEN SATURATION: 96 % | BODY MASS INDEX: 22.42 KG/M2 | WEIGHT: 165.34 LBS

## 2023-12-28 DIAGNOSIS — C61 PROSTATE CANCER (MULTI): Primary | ICD-10-CM

## 2023-12-28 PROCEDURE — 1126F AMNT PAIN NOTED NONE PRSNT: CPT

## 2023-12-28 PROCEDURE — 3077F SYST BP >= 140 MM HG: CPT

## 2023-12-28 PROCEDURE — 1160F RVW MEDS BY RX/DR IN RCRD: CPT

## 2023-12-28 PROCEDURE — 3080F DIAST BP >= 90 MM HG: CPT

## 2023-12-28 PROCEDURE — 99214 OFFICE O/P EST MOD 30 MIN: CPT

## 2023-12-28 PROCEDURE — 1159F MED LIST DOCD IN RCRD: CPT

## 2023-12-28 ASSESSMENT — ENCOUNTER SYMPTOMS
APPETITE CHANGE: 0
HEMATURIA: 0
CARDIOVASCULAR NEGATIVE: 1
EYES NEGATIVE: 1
HOT FLASHES: 0
RESPIRATORY NEGATIVE: 1
HEMATOLOGIC/LYMPHATIC NEGATIVE: 1
FREQUENCY: 1
DIFFICULTY URINATING: 0
UNEXPECTED WEIGHT CHANGE: 0
DECREASED CONCENTRATION: 1
FATIGUE: 0
SLEEP DISTURBANCE: 1
ROS GI COMMENTS: HEARTBURN
EXTREMITY WEAKNESS: 1
FEVER: 0

## 2023-12-28 ASSESSMENT — PAIN SCALES - GENERAL: PAINLEVEL: 0-NO PAIN

## 2024-01-02 RX ORDER — TROSPIUM CHLORIDE 20 MG/1
20 TABLET, FILM COATED ORAL 2 TIMES DAILY
Qty: 60 TABLET | Refills: 11 | Status: SHIPPED | OUTPATIENT
Start: 2024-01-02 | End: 2025-01-01

## 2024-01-09 ENCOUNTER — OFFICE VISIT (OUTPATIENT)
Dept: PRIMARY CARE | Facility: CLINIC | Age: 84
End: 2024-01-09
Payer: MEDICARE

## 2024-01-09 VITALS
HEART RATE: 87 BPM | BODY MASS INDEX: 22.89 KG/M2 | WEIGHT: 169 LBS | SYSTOLIC BLOOD PRESSURE: 164 MMHG | HEIGHT: 72 IN | DIASTOLIC BLOOD PRESSURE: 80 MMHG | OXYGEN SATURATION: 98 %

## 2024-01-09 DIAGNOSIS — N18.32 STAGE 3B CHRONIC KIDNEY DISEASE (MULTI): ICD-10-CM

## 2024-01-09 DIAGNOSIS — G31.9 DEGENERATIVE DISEASE OF NERVOUS SYSTEM, UNSPECIFIED (CMS-HCC): ICD-10-CM

## 2024-01-09 DIAGNOSIS — E78.2 HYPERLIPIDEMIA, MIXED: ICD-10-CM

## 2024-01-09 DIAGNOSIS — E11.42 TYPE 2 DIABETES MELLITUS WITH DIABETIC POLYNEUROPATHY, WITHOUT LONG-TERM CURRENT USE OF INSULIN (MULTI): ICD-10-CM

## 2024-01-09 DIAGNOSIS — C61 PROSTATE CANCER (MULTI): ICD-10-CM

## 2024-01-09 DIAGNOSIS — F33.42 RECURRENT MAJOR DEPRESSIVE DISORDER, IN FULL REMISSION (CMS-HCC): ICD-10-CM

## 2024-01-09 DIAGNOSIS — G82.20 PARAPARESIS (MULTI): ICD-10-CM

## 2024-01-09 DIAGNOSIS — D69.6 THROMBOCYTOPENIA, UNSPECIFIED (CMS-HCC): ICD-10-CM

## 2024-01-09 DIAGNOSIS — I10 ESSENTIAL HYPERTENSION: Primary | ICD-10-CM

## 2024-01-09 PROCEDURE — 1159F MED LIST DOCD IN RCRD: CPT | Performed by: INTERNAL MEDICINE

## 2024-01-09 PROCEDURE — 3079F DIAST BP 80-89 MM HG: CPT | Performed by: INTERNAL MEDICINE

## 2024-01-09 PROCEDURE — 99214 OFFICE O/P EST MOD 30 MIN: CPT | Performed by: INTERNAL MEDICINE

## 2024-01-09 PROCEDURE — 1160F RVW MEDS BY RX/DR IN RCRD: CPT | Performed by: INTERNAL MEDICINE

## 2024-01-09 PROCEDURE — 1126F AMNT PAIN NOTED NONE PRSNT: CPT | Performed by: INTERNAL MEDICINE

## 2024-01-09 PROCEDURE — 3077F SYST BP >= 140 MM HG: CPT | Performed by: INTERNAL MEDICINE

## 2024-01-09 RX ORDER — FAMOTIDINE 20 MG/1
20 TABLET, FILM COATED ORAL DAILY
COMMUNITY
End: 2024-05-08 | Stop reason: ALTCHOICE

## 2024-01-09 ASSESSMENT — ENCOUNTER SYMPTOMS
ABDOMINAL PAIN: 0
HYPERTENSION: 1
FREQUENCY: 1
SHORTNESS OF BREATH: 0
FATIGUE: 0
DIZZINESS: 0

## 2024-01-09 NOTE — PROGRESS NOTES
Subjective   Patient ID: Levon Mckay is a 83 y.o. male who presents for Follow-up chronic medical problems.    Urology following, placed on sanctura twice daily.  Nocturia 3-4 times, improved.  Sleeping also improved.  T level also improved 856 with PSA < 0.10.  Started pepcid for heartburn.  Medical records reviewed.  Meds and labs reviewed.      Hypertension  This is a chronic problem. The current episode started more than 1 year ago. The problem has been resolved since onset. The problem is controlled. Pertinent negatives include no chest pain or shortness of breath. There are no associated agents to hypertension. The current treatment provides significant improvement. There are no compliance problems.  Identifiable causes of hypertension include chronic renal disease.   Hyperlipidemia  This is a chronic problem. The current episode started more than 1 year ago. The problem is controlled. Recent lipid tests were reviewed and are high. Exacerbating diseases include chronic renal disease. There are no known factors aggravating his hyperlipidemia. Pertinent negatives include no chest pain or shortness of breath. Current antihyperlipidemic treatment includes statins. There are no compliance problems.         Review of Systems   Constitutional:  Negative for fatigue.   Respiratory:  Negative for shortness of breath.    Cardiovascular:  Negative for chest pain.   Gastrointestinal:  Negative for abdominal pain.   Genitourinary:  Positive for frequency.   Neurological:  Negative for dizziness.       Objective   /80 (BP Location: Right arm, Patient Position: Sitting)   Pulse 87   Ht 1.829 m (6')   Wt 76.7 kg (169 lb)   SpO2 98%   BMI 22.92 kg/m²     Physical Exam  Constitutional:       Appearance: Normal appearance.   Cardiovascular:      Rate and Rhythm: Normal rate and regular rhythm.      Pulses: Normal pulses.      Heart sounds: Normal heart sounds.   Pulmonary:      Effort: Pulmonary effort is normal.       Breath sounds: Normal breath sounds.   Neurological:      General: No focal deficit present.      Mental Status: He is alert.      Gait: Gait abnormal.   Psychiatric:         Mood and Affect: Mood normal.         Behavior: Behavior normal.         Thought Content: Thought content normal.         Judgment: Judgment normal.         Assessment/Plan   Problem List Items Addressed This Visit             ICD-10-CM    Essential hypertension - Primary I10     BP not at goal with current medications.  Rec low salt diet.  Check BP at home, goal < 140/90.  Discussed compliance with meds.           Relevant Orders    CBC and Auto Differential    Lipid Panel    Comprehensive metabolic panel    Hyperlipidemia, mixed E78.2     Lipids, LDL not at goal without statin.  Recommend low fat/cholesterol diet.  Pt resumed statin.         Relevant Orders    CBC and Auto Differential    Lipid Panel    Comprehensive metabolic panel    TSH with reflex to Free T4 if abnormal    Stage 3b chronic kidney disease (CMS/[facility]) N18.32     GFR 43, estephanie, followed here.           Paraparesis (CMS/[facility]) G82.20     S/p CVA, at MMI.  Continue HEP.         Recurrent major depressive disorder, in full remission (CMS/[facility]) F33.42     Remission with SSRI.         Thrombocytopenia, unspecified (CMS/HCC) D69.6     Recent plt count 125, estephanie, followed here.         Prostate cancer (CMS/HCC) C61     Condition estephanie, PSA < 0.10.  Followed with urology and oncology.         Degenerative disease of nervous system, unspecified (CMS/HCC) G31.9     S/p CVA, at MMI.         Type 2 diabetes mellitus with diabetic polyneuropathy, without long-term current use of insulin (CMS/HCC) E11.42     BS within normal range.  No meds for condition.  Check A1c.         Relevant Orders    Hemoglobin A1C

## 2024-01-11 NOTE — ASSESSMENT & PLAN NOTE
BP not at goal with current medications.  Rec low salt diet.  Check BP at home, goal < 140/90.  Discussed compliance with meds.

## 2024-01-17 ENCOUNTER — CLINICAL SUPPORT (OUTPATIENT)
Dept: AUDIOLOGY | Facility: CLINIC | Age: 84
End: 2024-01-17
Payer: MEDICARE

## 2024-01-17 ENCOUNTER — OFFICE VISIT (OUTPATIENT)
Dept: OTOLARYNGOLOGY | Facility: CLINIC | Age: 84
End: 2024-01-17
Payer: MEDICARE

## 2024-01-17 VITALS — BODY MASS INDEX: 22.35 KG/M2 | WEIGHT: 165 LBS | HEIGHT: 72 IN

## 2024-01-17 DIAGNOSIS — H61.23 BILATERAL IMPACTED CERUMEN: ICD-10-CM

## 2024-01-17 DIAGNOSIS — H90.3 ASYMMETRIC SNHL (SENSORINEURAL HEARING LOSS): Primary | ICD-10-CM

## 2024-01-17 DIAGNOSIS — H90.3 SENSORINEURAL HEARING LOSS (SNHL) OF BOTH EARS: Primary | ICD-10-CM

## 2024-01-17 DIAGNOSIS — S01.311A LACERATION OF RIGHT EAR CANAL, INITIAL ENCOUNTER: ICD-10-CM

## 2024-01-17 PROBLEM — Z86.16 PERSONAL HISTORY OF COVID-19: Status: ACTIVE | Noted: 2022-01-20

## 2024-01-17 PROBLEM — N39.41 URGE INCONTINENCE OF URINE: Status: ACTIVE | Noted: 2024-01-17

## 2024-01-17 PROCEDURE — 1159F MED LIST DOCD IN RCRD: CPT | Performed by: STUDENT IN AN ORGANIZED HEALTH CARE EDUCATION/TRAINING PROGRAM

## 2024-01-17 PROCEDURE — 1126F AMNT PAIN NOTED NONE PRSNT: CPT | Performed by: STUDENT IN AN ORGANIZED HEALTH CARE EDUCATION/TRAINING PROGRAM

## 2024-01-17 PROCEDURE — 99214 OFFICE O/P EST MOD 30 MIN: CPT | Performed by: STUDENT IN AN ORGANIZED HEALTH CARE EDUCATION/TRAINING PROGRAM

## 2024-01-17 PROCEDURE — 92567 TYMPANOMETRY: CPT | Performed by: AUDIOLOGIST

## 2024-01-17 PROCEDURE — 1036F TOBACCO NON-USER: CPT | Performed by: STUDENT IN AN ORGANIZED HEALTH CARE EDUCATION/TRAINING PROGRAM

## 2024-01-17 PROCEDURE — 1160F RVW MEDS BY RX/DR IN RCRD: CPT | Performed by: STUDENT IN AN ORGANIZED HEALTH CARE EDUCATION/TRAINING PROGRAM

## 2024-01-17 PROCEDURE — 69210 REMOVE IMPACTED EAR WAX UNI: CPT | Performed by: STUDENT IN AN ORGANIZED HEALTH CARE EDUCATION/TRAINING PROGRAM

## 2024-01-17 PROCEDURE — 92557 COMPREHENSIVE HEARING TEST: CPT | Performed by: AUDIOLOGIST

## 2024-01-17 RX ORDER — OFLOXACIN 3 MG/ML
4 SOLUTION AURICULAR (OTIC) 2 TIMES DAILY
Qty: 0.28 ML | Refills: 0 | Status: SHIPPED | OUTPATIENT
Start: 2024-01-17 | End: 2024-01-24

## 2024-01-17 NOTE — PROGRESS NOTES
Assessment  Bilateral sensorineural hearing loss  Bilateral cerumen impaction     Plan  Impacted cerumen removed from EACs.  Small laceration on the floor of the right EAC noted, Floxin drops prescribed.  Audiogram performed today notable for bilateral sensorineural hearing loss with mid frequency asymmetry, multiple diagnostic alternatives discussed, he would like to proceed with ABR testing for evaluation of retrocochlear pathology.  At this time the patient is not interested in amplification.  RTC 2m          History of Present Illness  1/17/24  The patient presents for follow-up, endorses bilateral cerumen buildup, no other otologic complaints.  Recall   82-year-old male presenting for initial evaluation of hearing loss. The patient reports developing right cerumen buildup, attempted removing the wax with Q-tip [id number] to cerumen impaction.  In addition the patient endorses bilateral progressive hearing loss for years.   Denies ear pain, vertigo, itching, discharge from ear, tinnitus, aural fullness or autophony.   Denies history of prior ear surgery.       History reviewed. No pertinent past medical history.    Past Surgical History:   Procedure Laterality Date    OTHER SURGICAL HISTORY  06/28/2022    Appendectomy    OTHER SURGICAL HISTORY  06/28/2022    Hip surgery    OTHER SURGICAL HISTORY  12/07/2022    Tonsillectomy with adenoidectomy         Current Outpatient Medications on File Prior to Visit   Medication Sig Dispense Refill    amLODIPine (Norvasc) 5 mg tablet Take 1 tablet (5 mg) by mouth once daily.      aspirin 81 mg capsule 1 capsule once every 24 hours.      atorvastatin (Lipitor) 20 mg tablet TAKE 1 TABLET BY MOUTH ONCE  DAILY AS DIRECTED 90 tablet 3    famotidine (Pepcid) 20 mg tablet Take 1 tablet (20 mg) by mouth once daily.      lisinopril 20 mg tablet TAKE 1 TABLET BY MOUTH DAILY 90 tablet 1    psyllium (Metamucil) 0.4 gram capsule Take 5 capsules by mouth 4 times a day.      sertraline  (Zoloft) 100 mg tablet TAKE 1 TABLET BY MOUTH DAILY 90 tablet 1    tamsulosin (Flomax) 0.4 mg 24 hr capsule TAKE 1 CAPSULE BY MOUTH DAILY 90 capsule 3    trospium (Sanctura) 20 mg tablet Take 1 tablet (20 mg) by mouth 2 times a day. 60 tablet 11     No current facility-administered medications on file prior to visit.        Allergies   Allergen Reactions    Other Unknown        Review of Systems  A detailed 12 point ROS was performed and is negative except as noted in the intake form, HPI and/or Past Medical History      Physical Exam  CONSTITUTIONAL: Well developed, NAD  VOICE: Normal voice quality  RESPIRATION: Breathing comfortably, no stridor.  CV: No clubbing/cyanosis/edema in hands.  EYES: EOM Intact, sclera normal.  NEURO: Alert and oriented times 3, Cranial nerves V,VII intact and symmetric bilaterally.  HEAD AND FACE: Symmetric facial features, no masses or lesions, sinuses nontender to palpation.  SALIVARY GLANDS: Parotid and submandibular glands normal bilaterally.  + EARS: Normal external ears  Bilateral EACs with cerumen impaction (removed/see procedure note)  Right EAC patent, small laceration noted along the floor of the right EAC with overlying crusting, tympanic membrane intact  Left EAC patent, tympanic membrane intact   NOSE: External nose midline, anterior rhinoscopy is normal with limited visualization to the anterior aspect of the interior turbinates. No lesions noted.  ORAL CAVITY/OROPHARYNX/LIPS: Normal mucous membranes, normal floor of mouth/tongue/OP, no masses or lesions are noted.  PHARYNGEAL WALLS AND NASOPHARYNX: No masses noted. Mucosa appears clean and moist  NECK/LYMPH: No LAD, no thyroid masses. Trachea palpably midline  SKIN: Neck skin is without injury  PSYCH: Alert and oriented with appropriate mood and affect     Procedure: Removal of impacted cerumen, bilateral  Indication: Cerumen impaction.   The procedure was reviewed and explained.  Verbal consent was obtained.  With the  use of the otoscope the right ear was examined, cerumen was cleaned with the use of curettes.  Attention was turned to the left ear, with the use of the otoscope the left ear was examined, cerumen was cleaned with the use of curettes. The patient tolerated the procedure well.

## 2024-01-17 NOTE — PROGRESS NOTES
COMPREHENSIVE AUDIOMETRIC EVALUATION      Name:  Levon Mckay  :  1940  Age:  83 y.o.  Date of Evaluation:  24   Referring Provider:  Dr. Ivan     History:  Mr. Mckay was seen today for an evaluation of hearing.  Patient reported medical history significant for diabetes approximately 20 years ago and radiation therapy, though targeted at the prostate and hip.  When asked, patient denied otalgia, aural fullness, tinnitus, and concerns for decreased hearing sensitivity.  Patient noted he has never worn a hearing aid.    See audiometric evaluation at end of this report or scanned under media tab    OTOSCOPY:       Right Ear: Clear canal though scabbing noted in the inferior portion of canal       Left Ear: Minimal non-occluding cerumen    226 Hz TYMPANOMETRY:       Right Ear: Type Ad: hypercompliant with normal peak pressure and ear canal volume       Left Ear: Type Ad: hypercompliant with normal peak pressure and ear canal volume    AUDIOMETRIC EVALUATION (Phones):       Right Ear: Mild sloping to Severe, Sensorineural hearing loss                 Left Ear: Mild rising to  - 2000 Hz sloping to Moderately severe, Sensorineural hearing loss        NOTE: Hearing sensitivity consistent with most recent audiometric evaluation 2022; Borderline clinically significant asymmetry noted at 750 - 1000 Hz       Test technique:  Standard Audiometry  Reliability:   good    SPEECH RECOGNITION THRESHOLD:       Right Ear:  25 dBHL in good agreement with PTA       Left Ear:  25 dBHL in good agreement with PTA    WORD RECOGNITION:       Right Ear:  excellent (90%) at elevated presentation level       Left Ear:  good (88%) at normal presentation level    DISCUSSION:   Discussed results and recommendations with patient and caregiver.  Questions were addressed and the patient was encouraged to contact our department should concerns arise.    RECOMMENDATIONS:  -Recommend patient return for hearing aid evaluation  following medical clearance  -Recommend monitoring of slight asymmetry of hearing sensitivity and for patient to return should unilateral otologic symptoms arise or concerns for changes in hearing sensitivity.    Sumeet Armenta, CCC-A     Appt: 9:30 - 10:00 AM

## 2024-02-01 DIAGNOSIS — I10 PRIMARY HYPERTENSION: ICD-10-CM

## 2024-02-01 RX ORDER — LISINOPRIL 20 MG/1
TABLET ORAL
Qty: 90 TABLET | Refills: 3 | Status: SHIPPED | OUTPATIENT
Start: 2024-02-01

## 2024-02-20 DIAGNOSIS — F41.8 OTHER SPECIFIED ANXIETY DISORDERS: ICD-10-CM

## 2024-02-20 RX ORDER — SERTRALINE HYDROCHLORIDE 100 MG/1
TABLET, FILM COATED ORAL
Qty: 90 TABLET | Refills: 1 | Status: SHIPPED | OUTPATIENT
Start: 2024-02-20

## 2024-02-21 ENCOUNTER — TELEPHONE (OUTPATIENT)
Dept: RADIATION ONCOLOGY | Facility: HOSPITAL | Age: 84
End: 2024-02-21
Payer: MEDICARE

## 2024-02-22 NOTE — PROGRESS NOTES
Cancer synopsis:  Rad/onc: Juan Carlos CNP  Uroloig: Dr. Escobedo/ Taurus CNP  Med/onc: Dr. 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   Prostate radiation was recommended, details below. ADT managed by Dr. Tellez.     Treatment Area #1  Location : Prostate_SV  Dates : 3/13/23 - 4/7/23  Number of Treatments : 20  Dose : 5500 cGy  Modality : VMAT    Treatment Area #1  Location : R iliac bone metastasis  Dates : 4/20/23 - 4/27/23  Number of Treatments : 3  Dose : 3000cGy  Modality : SBRT     Clinical Summary :  He tolerated this course of radiation  therapy relatively well, with no unusual events or unanticipated toxicities.  Symptoms during treatment included increased urinary urgency and frequency, managed with flomax, cialis, and myrbetriq. This has worsened his baseline incontinence 2/2 stroke.     History of presenting illness:    Patient ID: [id number]     Levon Mckay is a 83 y.o. male who presents for his de-luisito metastatic prostate cancer M1b per PET/PSMA. Currently completing 6m of ADT (last 02/2023), received SBRT to R iliac and prostate/SV/LN via VMAT 05/2023     RT Site: prostate, SV and r iliac bone  RT Date: 04/2023  Hormone therapy: Yes  Hot Flushes: Denies  Fatigue: Denies  Bone pain: Admits to new right rib pain after fall. Worse with deep breathes. Not taking anything for pain. Has fx ribs in past with similar symptoms per patient.  EDUARD: virtual  ED: Not sexually active per patient.  ED medications: No  IPSS: virtual  Urinary symptoms: Denies dysuria or hematuria. Reports frequency and nocturia have increased vastly since RT. Prior to RT was going 3 times times a night. Reports tropsium has reduced nocturia about 4 times a night. Reports mild urgency. Admits to weak stream. Recently started tropsium with   Gregg. Patient reports helping with frequency and night time sleeping.  Urinary Medications: Yes, flowmax daily. Tropsium daily  Rectal bleeding: Denies  Colonoscopy: None on file  Other systems: Admits to recent fall in last week. Report striking hip and head. Denies losing consciousness. Does report some linger right rib pain. States worse with taking deep breaths. Not currently taking anything for intermittent pain with breathing.      Review of systems:  Review of Systems   Constitutional:  Negative for fatigue, fever and unexpected weight change.   Respiratory:  Negative for cough, chest tightness and shortness of breath.    Cardiovascular:  Negative for chest pain, palpitations and leg swelling.   Gastrointestinal:  Negative for abdominal pain, anal bleeding, blood in stool, constipation, diarrhea and rectal pain.   Endocrine: Negative for cold intolerance, heat intolerance and polyuria.   Genitourinary:  Negative for decreased urine volume, difficulty urinating, dysuria, frequency, hematuria and urgency.   Musculoskeletal:  Negative for arthralgias, back pain, gait problem and joint swelling.   Skin: Negative.    Allergic/Immunologic: Negative.    Neurological:  Negative for dizziness, syncope and weakness.   Psychiatric/Behavioral: Negative.         Past Medical history  No past medical history on file.     Surgical/family history  No family history on file.   Past Surgical History:   Procedure Laterality Date    OTHER SURGICAL HISTORY  06/28/2022    Appendectomy    OTHER SURGICAL HISTORY  06/28/2022    Hip surgery    OTHER SURGICAL HISTORY  12/07/2022    Tonsillectomy with adenoidectomy        Social History  Tobacco Use: Low Risk  (1/17/2024)    Patient History     Smoking Tobacco Use: Never     Smokeless Tobacco Use: Never     Passive Exposure: Never         Current med list:  Current Outpatient Medications   Medication Instructions    amLODIPine (Norvasc) 5 mg tablet 1 tablet, oral, Daily    aspirin 81  mg capsule 1 capsule, Every 24 hours    atorvastatin (Lipitor) 20 mg tablet TAKE 1 TABLET BY MOUTH ONCE  DAILY AS DIRECTED    famotidine (PEPCID) 20 mg, oral, Daily    lisinopril 20 mg tablet TAKE 1 TABLET BY MOUTH DAILY    psyllium (Metamucil) 0.4 gram capsule 5 capsules, oral, 4 times daily    sertraline (Zoloft) 100 mg tablet TAKE 1 TABLET BY MOUTH DAILY    tamsulosin (Flomax) 0.4 mg 24 hr capsule TAKE 1 CAPSULE BY MOUTH DAILY    trospium (SANCTURA) 20 mg, oral, 2 times daily        Last recorded vital:  Virtual    Physical exam  Virtual    Pertinent labs:  Prostate Specific AG   Date/Time Value Ref Range Status   12/21/2023 01:27 PM <0.10 <=4.00 ng/mL Final         Dx:  Problem List Items Addressed This Visit       Fall    Overview     FALL.          Other Visit Diagnoses       Malignant neoplasm of prostate (CMS/HCC)    -  Primary    OAB (overactive bladder)             PSA of <0.10 was reviewed and is undectable. Review of latent SE including rectal bleeding, hematuria, urinary strictures, ED where reviewed as well as how to contact office if s/s present. Denies latent SE. NCCN guidelines where reviewed and routine FUV of every 3m for first year and every 6m for four years for a total of five years was discussed. Patient verbalized understanding.     Discussed ongoing rib pain from recent fall. Advised imaging of chest at the minimum to ensure no bone fx at pain site. Patient would like to wait at this time on any further imaging. Did place order if changes mind.     Recommend vitamin D supplementation, start (or increase by) 400-1000 units daily. Reviewed importance of intake while on Testerone lowering therapy as well as after until Testerone re-establishes, at which point may stop if DEXA indicative of normal bone density. Also reviewed that individuals at a higher risk such as those over the age of 75 or those with a hx of bone fx, osteoporosis or osteopenia to continue supplementation indefinitely with  routine DEXA imaging as per national guidelines to assess bone health continually.    PLAN:  FUV 6m  Labs PSA in 6m  Imaging none  FUV other providers: PCP for routine evals, urology, med/onc as scheduled      Please contact office with any concerns:  Hong Cardona CNP  [phone]    I performed this visit using realtime telehealth tools, including an audio/video OR telephone connection between  patient’s name and location Levon Mckay and Hong Rangel CNP.      2. POS 10: Telehealth provided in patient's home.  o Patient is located in their home (which is a location other than a hospital or other  facility where the patient receives care in a private residence) when receiving  health services or health related services through telecommunication technology.

## 2024-02-23 ENCOUNTER — HOSPITAL ENCOUNTER (OUTPATIENT)
Dept: RADIATION ONCOLOGY | Facility: HOSPITAL | Age: 84
Setting detail: RADIATION/ONCOLOGY SERIES
Discharge: HOME | End: 2024-02-23
Payer: MEDICARE

## 2024-02-23 DIAGNOSIS — N32.81 OAB (OVERACTIVE BLADDER): ICD-10-CM

## 2024-02-23 DIAGNOSIS — C61 MALIGNANT NEOPLASM OF PROSTATE (MULTI): Primary | ICD-10-CM
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
## 2024-02-23 PROCEDURE — 99214 OFFICE O/P EST MOD 30 MIN: CPT

## 2024-02-23 ASSESSMENT — ENCOUNTER SYMPTOMS
HEMATURIA: 0
DIFFICULTY URINATING: 0
BACK PAIN: 0
CONSTIPATION: 0
BLOOD IN STOOL: 0
UNEXPECTED WEIGHT CHANGE: 0
FATIGUE: 0
RECTAL PAIN: 0
DYSURIA: 0
PALPITATIONS: 0
COUGH: 0
ANAL BLEEDING: 0
FEVER: 0
SHORTNESS OF BREATH: 0
CHEST TIGHTNESS: 0
ARTHRALGIAS: 0
ABDOMINAL PAIN: 0
DIARRHEA: 0
ALLERGIC/IMMUNOLOGIC NEGATIVE: 1
DIZZINESS: 0
PSYCHIATRIC NEGATIVE: 1
JOINT SWELLING: 0
FREQUENCY: 0
WEAKNESS: 0

## 2024-03-14 ENCOUNTER — CLINICAL SUPPORT (OUTPATIENT)
Dept: AUDIOLOGY | Facility: CLINIC | Age: 84
End: 2024-03-14
Payer: MEDICARE

## 2024-03-14 DIAGNOSIS — H90.3 SENSORINEURAL HEARING LOSS, ASYMMETRICAL: Primary | ICD-10-CM

## 2024-03-14 PROCEDURE — 92588 EVOKED AUDITORY TST COMPLETE: CPT | Performed by: AUDIOLOGIST

## 2024-03-14 PROCEDURE — 92653 AEP NEURODIAGNOSTIC I&R: CPT | Performed by: AUDIOLOGIST

## 2024-03-14 NOTE — PROGRESS NOTES
[facility]  DISTORTION PRODUCT OTOACOUSTIC EMISSIONS (DPOAE) AND  AUDITORY BRAINSTEM RESPONSE (ABR)      Name:  Levon Mckay   :  1940  Age:  83 y.o.  Date of Evaluation:  24    HISTORY    Levon Mckay is seen today at the request of Ferny Guadalupe M.D.    EVALUATION  See scanned tracings in Media    RESULTS    Distortion Product Otoacoustic Emissions (DPOAE):  Right ear:  Emissions were absent at all frequencies except 2k Hz.    Left ear:  Emissions were absent at all frequencies tested.    Auditory Brainstem Response (ABR):   Right ear:  Testing revealed normal absolute latencies and interpeak intervals  Left ear:  Testing revealed normal absolute latencies and interpeak intervals  Interaural comparison yielded symmetrical results.  Overall waveform morphology was fair overall.      DISCUSSION    Today's DPOAE findings were in good agreement with pure tone testing and are consistent with a significant bilateral cochlear disorder.    ABR findings do not support retrocochlear pathology.        Results were relayed to Ferny Guadalupe M.D.    APPOINTMENT TIME  10:00-11:00am      Rafaela Joshi  Doctor of Audiology  Senior Audiologist

## 2024-03-25 ENCOUNTER — LAB (OUTPATIENT)
Dept: LAB | Facility: LAB | Age: 84
End: 2024-03-25
Payer: MEDICARE

## 2024-03-25 DIAGNOSIS — C61 MALIGNANT NEOPLASM OF PROSTATE (MULTI): ICD-10-CM

## 2024-03-25 DIAGNOSIS — C61 PROSTATE CANCER (MULTI): ICD-10-CM

## 2024-03-25 PROCEDURE — 36415 COLL VENOUS BLD VENIPUNCTURE: CPT

## 2024-03-25 PROCEDURE — 84403 ASSAY OF TOTAL TESTOSTERONE: CPT

## 2024-03-25 PROCEDURE — 84153 ASSAY OF PSA TOTAL: CPT

## 2024-03-25 ASSESSMENT — ENCOUNTER SYMPTOMS
APPETITE CHANGE: 0
HEMATURIA: 0
FATIGUE: 0
DIFFICULTY URINATING: 0
HEMATOLOGIC/LYMPHATIC NEGATIVE: 1
FREQUENCY: 1
FEVER: 0
HOT FLASHES: 0
UNEXPECTED WEIGHT CHANGE: 0
DECREASED CONCENTRATION: 1
SLEEP DISTURBANCE: 1
ROS GI COMMENTS: HEARTBURN

## 2024-03-25 NOTE — PROGRESS NOTES
Patient ID: Levon Mckay is a 83 y.o. male.  Diagnosis:  Prostate Cancer   [facility] Dr. Geoff Peacock: Dr. Caballero  Urology: Dr. Escobedo     Patient Care Team:  Ilir Bear MD as PCP - General  Ilir Bear MD [facility] PCP [facility]  JESSE Dwyer-CNP as Nurse Practitioner (Hematology and Oncology)  Raymon Tellez MD as Medical Oncologist (Hematology and Oncology)    Current Therapy: Surveillance     ONCOLOGIC HISTORY    6/28/22 Elevated PSA of 12.61   7/5/22 PSA rechecked 15.9.   11/2/22 MRI prostate PIRADS 5   12/20/22 Biopsy (Cintia 7 (3+4) disease, up to 70% of tissue involved in tumor)   1/2023 - PSMA reveals R iliac bone met  2/7/23 - ADT 6 months started. plan for XRT prostate and SBRT iliac bone  3/14/23 - XRT started  6/29/23 - PSA < 0.1 T < 10  --  82-year-old gentleman with newly diagnosed prostate cancer, cT1c, GG2 (50% of cores+, 1/1+ targeted), iPSA 15.9.  11/2/2022 MRI prostate noted a 31.6 cc gland, PI-RADS 5 lesion in the left PZ posteriorly at mid gland with extension into the left TZ.  Broad capsular abutment with focal bulging and capsular irregularity, but no gross RUCHI.  No evidence of SV invasion.  No lymphadenopathy.  12/20/2022 transperineal biopsy noted Elwell 3+4 in 50% of the cores, Elwell 3+4 in 1/1 targeted core.  Cintia 4 involvement ranges 5 to 15%.  No evidence of intraductal carcinoma or cribriform pattern.    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        Past Medical History: Levon has no past medical history on file.  Surgical History:  Levon has a past surgical history that includes Other surgical history (06/28/2022); Other surgical history (06/28/2022); and Other surgical history (12/07/2022).  Social History:  Levon reports that he has  "never smoked. He has never been exposed to tobacco smoke. He has never used smokeless tobacco. He reports that he does not currently use alcohol. He reports that he does not use drugs.  Family History:  No family history on file.  Family Oncology History:  Cancer-related family history is not on file.    SUBJECTIVE:    History of Present Illness:  Levon Mckay is a 83 y.o. male who presents today for follow up of prostate cancer. Patient of Dr. Tellez currently on surveillance. Is feeling well. He was started on Tropsium by Dr. Escobedo and that has improved his urinary symptoms at first but now they have returned, getting up a couple times a night and are a \"pain in the can\". He is living with them. Still having heart burn - hasn't started the Pepcid. Having memory issues, may be worse than before treatment. Possible MCI. PCP wanted to evaluate for MCI - hasn't yet, has follow up in May.    Review of Systems   Constitutional:  Negative for appetite change, fatigue, fever and unexpected weight change.   Gastrointestinal:         Heartburn   Endocrine: Negative for hot flashes.   Genitourinary:  Positive for frequency and nocturia. Negative for difficulty urinating and hematuria.    Musculoskeletal: Negative.  Negative for gait problem.   Neurological:  Negative for extremity weakness and gait problem.   Hematological: Negative.    Psychiatric/Behavioral:  Positive for decreased concentration and sleep disturbance.      Allergies  Allergies   Allergen Reactions    Other Unknown      Medications  Current Outpatient Medications   Medication Instructions    amLODIPine (Norvasc) 5 mg tablet 1 tablet, oral, Daily    aspirin 81 mg capsule 1 capsule, Every 24 hours    atorvastatin (Lipitor) 20 mg tablet TAKE 1 TABLET BY MOUTH ONCE  DAILY AS DIRECTED    famotidine (PEPCID) 20 mg, oral, Daily    lisinopril 20 mg tablet TAKE 1 TABLET BY MOUTH DAILY    psyllium (Metamucil) 0.4 gram capsule 5 capsules, oral, 4 times daily    " sertraline (Zoloft) 100 mg tablet TAKE 1 TABLET BY MOUTH DAILY    tamsulosin (Flomax) 0.4 mg 24 hr capsule TAKE 1 CAPSULE BY MOUTH DAILY    trospium (SANCTURA) 20 mg, oral, 2 times daily        OBJECTIVE:    VS / Pain:  There were no vitals taken for this visit.  BSA: There is no height or weight on file to calculate BSA.  Wt Readings from Last 5 Encounters:   01/17/24 74.8 kg (165 lb)   01/09/24 76.7 kg (169 lb)   12/28/23 75 kg (165 lb 5.5 oz)   10/31/23 73.9 kg (163 lb)   07/06/23 74.8 kg (165 lb)     Physical Exam  Constitutional:       Comments: Alert and oriented x3, no signs of distress, alert and cooperative, voice is clear, no slurred speech, speaking in full sentences with organized thought pattern. No coughing or audible wheezing during phone visit.     Performance Status:  ECOG Score: 1- Restricted in physically strenuous activity.  Carries out light duty.  Karnofsky Score: 70 - Cares for self; unable to carry on normal activity or do normal work   Diagnostic Results   Results for orders placed or performed in visit on 03/25/24 (from the past 96 hour(s))   Prostate Specific Antigen   Result Value Ref Range    Prostate Specific AG <0.10 <=4.00 ng/mL   Testosterone   Result Value Ref Range    Testosterone 689 240 - 1,000 ng/dL     Lab Results   Component Value Date    PSA <0.10 03/25/2024    PSA <0.10 12/21/2023    PSA <0.10 09/19/2023     Lab Results   Component Value Date    TESTOSTERONE 689 03/25/2024       Assessment/Plan   81 yo  man (PMH stroke 20y ago) ECOG 2, diagnosed with int risk PC (iPSA 15 / Gl 7) with oligmet bone R iliac bone. 6 months ADT and XRt prostate and bone completed with undetectable PSA / low T levels.   Again, with M1-directed therapy and short term ADT, given age / comorbidities we are comfortable holding therapy and monitor and see if we can offer remission to him. PSA/T in 3 months    T has now recovered to 856. PSA remains undetectable.     No matching staging  information was found for the patient.  Diagnoses and all orders for this visit:  Prostate cancer (CMS/HCC) (Primary)  -     Testosterone; Future  -     Prostate Specific Antigen; Future  -     Testosterone; Future    Treatment Plan:  [No matching plan found]  - PSA and T in 3 months  - Try Pepcid for heartburn & Tums  - Follow up with PCP for MCI testing  - PCP for health maintenance  - Urology follow up as scheduled in May    Follow up in 3 months via phone to check PSA and T.       Verbal consent was requested and obtained from patient on this date for a telehealth visit.    Patient verbalizes understanding of above plan. Time provided for patient's questions. All questions answered to patient's satisfaction in office. Patient instructed to reach out for any new concerning issues at [phone].    Lorena Platt MSN, APRN, A-GNP-C, OCN   Oncology   [address] [address]   Epic Secure Chat   Phone: [phone]  [email or web address]

## 2024-03-26 LAB
PSA SERPL-MCNC: <0.1 NG/ML
TESTOST SERPL-MCNC: 689 NG/DL (ref 240–1000)

## 2024-03-28 ENCOUNTER — TELEMEDICINE (OUTPATIENT)
Dept: HEMATOLOGY/ONCOLOGY | Facility: CLINIC | Age: 84
End: 2024-03-28
Payer: MEDICARE

## 2024-03-28 DIAGNOSIS — C61 PROSTATE CANCER (MULTI): Primary | ICD-10-CM

## 2024-03-28 PROCEDURE — 1159F MED LIST DOCD IN RCRD: CPT

## 2024-03-28 PROCEDURE — 99442 PR PHYS/QHP TELEPHONE EVALUATION 11-20 MIN: CPT

## 2024-03-28 PROCEDURE — 1160F RVW MEDS BY RX/DR IN RCRD: CPT

## 2024-03-28 ASSESSMENT — ENCOUNTER SYMPTOMS
MUSCULOSKELETAL NEGATIVE: 1
EXTREMITY WEAKNESS: 0

## 2024-04-03 ENCOUNTER — OFFICE VISIT (OUTPATIENT)
Dept: OTOLARYNGOLOGY | Facility: CLINIC | Age: 84
End: 2024-04-03
Payer: MEDICARE

## 2024-04-03 VITALS — BODY MASS INDEX: 22.35 KG/M2 | HEIGHT: 72 IN | WEIGHT: 165 LBS

## 2024-04-03 DIAGNOSIS — H61.22 IMPACTED CERUMEN OF LEFT EAR: ICD-10-CM

## 2024-04-03 DIAGNOSIS — H90.3 ASYMMETRIC SNHL (SENSORINEURAL HEARING LOSS): Primary | ICD-10-CM

## 2024-04-03 PROBLEM — N32.81 OVERACTIVE BLADDER: Status: ACTIVE | Noted: 2024-02-23

## 2024-04-03 PROBLEM — S01.309A: Status: ACTIVE | Noted: 2024-04-03

## 2024-04-03 PROCEDURE — 99213 OFFICE O/P EST LOW 20 MIN: CPT | Performed by: STUDENT IN AN ORGANIZED HEALTH CARE EDUCATION/TRAINING PROGRAM

## 2024-04-03 PROCEDURE — 1036F TOBACCO NON-USER: CPT | Performed by: STUDENT IN AN ORGANIZED HEALTH CARE EDUCATION/TRAINING PROGRAM

## 2024-04-03 PROCEDURE — 69210 REMOVE IMPACTED EAR WAX UNI: CPT | Performed by: STUDENT IN AN ORGANIZED HEALTH CARE EDUCATION/TRAINING PROGRAM

## 2024-04-03 PROCEDURE — 1160F RVW MEDS BY RX/DR IN RCRD: CPT | Performed by: STUDENT IN AN ORGANIZED HEALTH CARE EDUCATION/TRAINING PROGRAM

## 2024-04-03 PROCEDURE — 1159F MED LIST DOCD IN RCRD: CPT | Performed by: STUDENT IN AN ORGANIZED HEALTH CARE EDUCATION/TRAINING PROGRAM

## 2024-04-03 NOTE — PROGRESS NOTES
Assessment  Asymmetric sensorineural hearing loss  Cerumen impaction     Plan  Left cerumen impaction removed.  Previously noted laceration along the floor of the right EAC has resolved.   ABR 3/14/24 showing no evidence of retrocochlear pathology.  At this time the patient is not interested in amplification.  We will monitor his hearing with yearly audiograms  RTC 1y or sooner as needed.           History of Present Illness  4/3/24  ABR reviewed and discussed with the patient, no evidence of retrocochlear pathology.  1/17/24  The patient presents for follow-up, endorses bilateral cerumen buildup, no other otologic complaints.  Recall   82-year-old male presenting for initial evaluation of hearing loss. The patient reports developing right cerumen buildup, attempted removing the wax with Q-tip [id number] to cerumen impaction.  In addition the patient endorses bilateral progressive hearing loss for years.   Denies ear pain, vertigo, itching, discharge from ear, tinnitus, aural fullness or autophony.   Denies history of prior ear surgery.       History reviewed. No pertinent past medical history.    Past Surgical History:   Procedure Laterality Date    OTHER SURGICAL HISTORY  06/28/2022    Appendectomy    OTHER SURGICAL HISTORY  06/28/2022    Hip surgery    OTHER SURGICAL HISTORY  12/07/2022    Tonsillectomy with adenoidectomy         Current Outpatient Medications on File Prior to Visit   Medication Sig Dispense Refill    amLODIPine (Norvasc) 5 mg tablet Take 1 tablet (5 mg) by mouth once daily.      aspirin 81 mg capsule 1 capsule once every 24 hours.      atorvastatin (Lipitor) 20 mg tablet TAKE 1 TABLET BY MOUTH ONCE  DAILY AS DIRECTED 90 tablet 3    famotidine (Pepcid) 20 mg tablet Take 1 tablet (20 mg) by mouth once daily.      lisinopril 20 mg tablet TAKE 1 TABLET BY MOUTH DAILY 90 tablet 3    psyllium (Metamucil) 0.4 gram capsule Take 5 capsules by mouth 4 times a day.      sertraline (Zoloft) 100 mg tablet  TAKE 1 TABLET BY MOUTH DAILY 90 tablet 1    tamsulosin (Flomax) 0.4 mg 24 hr capsule TAKE 1 CAPSULE BY MOUTH DAILY 90 capsule 3    trospium (Sanctura) 20 mg tablet Take 1 tablet (20 mg) by mouth 2 times a day. 60 tablet 11     No current facility-administered medications on file prior to visit.        Allergies   Allergen Reactions    Other Unknown        Review of Systems  A detailed 12 point ROS was performed and is negative except as noted in the intake form, HPI and/or Past Medical History      Physical Exam  CONSTITUTIONAL: Well developed, NAD  VOICE: Normal voice quality  RESPIRATION: Breathing comfortably, no stridor.  CV: No clubbing/cyanosis/edema in hands.  EYES: EOM Intact, sclera normal.  NEURO: Alert and oriented times 3, Cranial nerves V,VII intact and symmetric bilaterally.  HEAD AND FACE: Symmetric facial features, no masses or lesions, sinuses nontender to palpation.  SALIVARY GLANDS: Parotid and submandibular glands normal bilaterally.  + EARS: Normal external ears  Right EAC patent, tympanic membrane intact.  Previously noted laceration has resolved.    Left EAC with cerumen obstruction (removed)  Left EAC patent, tympanic membrane intact   NOSE: External nose midline, anterior rhinoscopy is normal with limited visualization to the anterior aspect of the interior turbinates. No lesions noted.  ORAL CAVITY/OROPHARYNX/LIPS: Normal mucous membranes, normal floor of mouth/tongue/OP, no masses or lesions are noted.  PHARYNGEAL WALLS AND NASOPHARYNX: No masses noted. Mucosa appears clean and moist  NECK/LYMPH: No LAD, no thyroid masses. Trachea palpably midline  SKIN: Neck skin is without injury  PSYCH: Alert and oriented with appropriate mood and affect     Procedure: Removal of impacted cerumen, left  Indication: Cerumen impaction.   The procedure was reviewed and explained.  Verbal consent was obtained.  With the use of the otoscope the left ear was examined, cerumen was cleaned with the use of  curettes. The patient tolerated the procedure well.  Hearing returned to baseline following cerumen removal.

## 2024-05-06 ENCOUNTER — LAB (OUTPATIENT)
Dept: LAB | Facility: LAB | Age: 84
End: 2024-05-06
Payer: MEDICARE

## 2024-05-06 DIAGNOSIS — N18.32 STAGE 3B CHRONIC KIDNEY DISEASE (MULTI): ICD-10-CM

## 2024-05-06 DIAGNOSIS — I10 ESSENTIAL HYPERTENSION: ICD-10-CM

## 2024-05-06 DIAGNOSIS — E78.2 HYPERLIPIDEMIA, MIXED: ICD-10-CM

## 2024-05-06 DIAGNOSIS — E11.42 TYPE 2 DIABETES MELLITUS WITH DIABETIC POLYNEUROPATHY, WITHOUT LONG-TERM CURRENT USE OF INSULIN (MULTI): ICD-10-CM

## 2024-05-06 PROCEDURE — 85025 COMPLETE CBC W/AUTO DIFF WBC: CPT

## 2024-05-06 PROCEDURE — 84165 PROTEIN E-PHORESIS SERUM: CPT | Performed by: INTERNAL MEDICINE

## 2024-05-06 PROCEDURE — 80061 LIPID PANEL: CPT

## 2024-05-06 PROCEDURE — 36415 COLL VENOUS BLD VENIPUNCTURE: CPT

## 2024-05-06 PROCEDURE — 84165 PROTEIN E-PHORESIS SERUM: CPT

## 2024-05-06 PROCEDURE — 86320 SERUM IMMUNOELECTROPHORESIS: CPT | Performed by: INTERNAL MEDICINE

## 2024-05-06 PROCEDURE — 80053 COMPREHEN METABOLIC PANEL: CPT

## 2024-05-06 PROCEDURE — 86334 IMMUNOFIX E-PHORESIS SERUM: CPT

## 2024-05-06 PROCEDURE — 83036 HEMOGLOBIN GLYCOSYLATED A1C: CPT

## 2024-05-06 PROCEDURE — 84443 ASSAY THYROID STIM HORMONE: CPT

## 2024-05-07 LAB
ALBUMIN SERPL BCP-MCNC: 4.6 G/DL (ref 3.4–5)
ALP SERPL-CCNC: 110 U/L (ref 33–136)
ALT SERPL W P-5'-P-CCNC: 19 U/L (ref 10–52)
ANION GAP SERPL CALC-SCNC: 16 MMOL/L (ref 10–20)
AST SERPL W P-5'-P-CCNC: 15 U/L (ref 9–39)
BASOPHILS # BLD AUTO: 0.06 X10*3/UL (ref 0–0.1)
BASOPHILS NFR BLD AUTO: 0.5 %
BILIRUB SERPL-MCNC: 0.8 MG/DL (ref 0–1.2)
BUN SERPL-MCNC: 35 MG/DL (ref 6–23)
CALCIUM SERPL-MCNC: 9.9 MG/DL (ref 8.6–10.6)
CHLORIDE SERPL-SCNC: 107 MMOL/L (ref 98–107)
CHOLEST SERPL-MCNC: 170 MG/DL (ref 0–199)
CHOLESTEROL/HDL RATIO: 4
CO2 SERPL-SCNC: 27 MMOL/L (ref 21–32)
CREAT SERPL-MCNC: 2.11 MG/DL (ref 0.5–1.3)
EGFRCR SERPLBLD CKD-EPI 2021: 30 ML/MIN/1.73M*2
EOSINOPHIL # BLD AUTO: 0.46 X10*3/UL (ref 0–0.4)
EOSINOPHIL NFR BLD AUTO: 3.9 %
ERYTHROCYTE [DISTWIDTH] IN BLOOD BY AUTOMATED COUNT: 12.5 % (ref 11.5–14.5)
EST. AVERAGE GLUCOSE BLD GHB EST-MCNC: 100 MG/DL
GLUCOSE SERPL-MCNC: 110 MG/DL (ref 74–99)
HBA1C MFR BLD: 5.1 %
HCT VFR BLD AUTO: 44.1 % (ref 41–52)
HDLC SERPL-MCNC: 42 MG/DL
HGB BLD-MCNC: 14.9 G/DL (ref 13.5–17.5)
IMM GRANULOCYTES # BLD AUTO: 0.03 X10*3/UL (ref 0–0.5)
IMM GRANULOCYTES NFR BLD AUTO: 0.3 % (ref 0–0.9)
LDLC SERPL CALC-MCNC: 105 MG/DL
LYMPHOCYTES # BLD AUTO: 1.24 X10*3/UL (ref 0.8–3)
LYMPHOCYTES NFR BLD AUTO: 10.6 %
MCH RBC QN AUTO: 32.7 PG (ref 26–34)
MCHC RBC AUTO-ENTMCNC: 33.8 G/DL (ref 32–36)
MCV RBC AUTO: 97 FL (ref 80–100)
MONOCYTES # BLD AUTO: 0.81 X10*3/UL (ref 0.05–0.8)
MONOCYTES NFR BLD AUTO: 6.9 %
NEUTROPHILS # BLD AUTO: 9.1 X10*3/UL (ref 1.6–5.5)
NEUTROPHILS NFR BLD AUTO: 77.8 %
NON HDL CHOLESTEROL: 128 MG/DL (ref 0–149)
NRBC BLD-RTO: 0 /100 WBCS (ref 0–0)
PLATELET # BLD AUTO: 166 X10*3/UL (ref 150–450)
POTASSIUM SERPL-SCNC: 5.1 MMOL/L (ref 3.5–5.3)
PROT SERPL-MCNC: 7 G/DL (ref 6.4–8.2)
RBC # BLD AUTO: 4.56 X10*6/UL (ref 4.5–5.9)
SODIUM SERPL-SCNC: 145 MMOL/L (ref 136–145)
TRIGL SERPL-MCNC: 114 MG/DL (ref 0–149)
TSH SERPL-ACNC: 3.51 MIU/L (ref 0.44–3.98)
VLDL: 23 MG/DL (ref 0–40)
WBC # BLD AUTO: 11.7 X10*3/UL (ref 4.4–11.3)

## 2024-05-07 NOTE — PROGRESS NOTES
Subjective   Patient ID: Levon Mckay is a 84 y.o. male presenting for PVR check and medication response     HPI  Last visit with myself 12/06/2023. Also following Dr. Escobedo.   History of BPH wth Nocturia, Prostate Cancer s/p EBRT and Leuprolide injection, CKD3, Urinary frequency, and urgency. Symptoms have improved significantly since starting Trospium last visit.   NTF was up to 10x, now NTF 1 to 3x. Daytime frequency not bothersome. Also taking Tamsulosin 0.4 mg daily   He drinks mostly tea and coffee in the morning and afternoon, limits fluids in the evening     Urinalysis today Negative   PVR 60 ml     Lab Results   Component Value Date    PSA <0.10 03/25/2024    PSA <0.10 12/21/2023    PSA <0.10 09/19/2023    PSA <0.10 06/23/2023    PSA 8.91 (H) 03/17/2023    PSA 12.31 (H) 01/24/2023    PSA 15.9 (H) 07/05/2022     Review of Systems  All other systems have been reviewed and are negative for complaint.    Objective   Physical Exam  General: Well developed, well nourished, alert and cooperative, appears in no acute distress  Eyes: Non-injected conjunctiva, sclera clear, no proptosis  Cardiac: Extremities are warm and well perfused. No edema, cyanosis or pallor.   Lungs: Breathing is easy, non-labored. Speaking in clear and complete sentences. Normal diaphragmatic movement.  MSK: Ambulatory with steady gait, unassisted  Neuro: alert and oriented to person, place and time  Psych: Demonstrates good judgement and reason, without hallucinations, abnormal affect or abnormal behaviors.  Skin: no obvious lesions, no rashes.    Assessment/Plan   There are no diagnoses linked to this encounter.    -Urinary Frequency     Continue Trospium 20 mg twice daily   We discussed the pathophysiology of overactive bladder. We discussed possible treatment options including doing conservative voiding techniques, medications, and surgical options.. He was counseled regarding bladder retraining, diet choices, and fluid restriction.      Patient was informed that first line treatment is behavioral therapy. This includes:   -Fluid balancing and sometimes restriction   -Reducing caffeine or other bladder stimulants   -Bladder retraining  -Delayed voiding to help defer the overwhelming urge  -Avoid constipation  -Avoid activities that exacerbate incontinence       -Kegel exercises and pelvic floor muscle training     I educated the patient on relevant lower urinary tract anatomy and physiology with emphasis on differential diagnosis as well as contributing factors to the patient's lower urinary tract symptoms. I educated the patient on dietary and behavioral modifications pertinent to the patient's complaints. I recommended to avoid caffeine, alcohol, spicy and acidic oral intake and to regulate fluid intake and voiding (timed voiding, double voiding). I stressed the importance of avoiding constipation and recommended stool softeners unless diarrhea present. We discussed limiting fluid intake at night and elevating legs prior to bedtime.     -BPH   Today we discussed BPH. BPH is the benign growth of prostate tissue that may cause bothersome urinary symptoms. The mechanism of action as well as the risks, benefits, common side effects, and alternatives to all prescribed medications were discussed with the patient at length. The patient had the opportunity to ask questions and all questions were answered.   Additionally, if you continue to experience bothersome symptoms despite medication, there are minimally invasive procedures to alleviate these symptoms.  PVR 60 ml    Continue Tamsulosin 0.4 mg daily     Patient will follow up in 6 months with PSA and Testosterone prior.     All questions and concerns were addressed. Patient verbalizes understanding and has no other questions at this time. If you have any questions about your care, do not hesitate to call and leave a message, we return calls in a timely manner.    Emerita Condon-- APRN, CNP  Office  Phone:  [phone]

## 2024-05-08 ENCOUNTER — OFFICE VISIT (OUTPATIENT)
Dept: UROLOGY | Facility: HOSPITAL | Age: 84
End: 2024-05-08
Payer: MEDICARE

## 2024-05-08 ENCOUNTER — HOSPITAL ENCOUNTER (OUTPATIENT)
Dept: RADIOLOGY | Facility: CLINIC | Age: 84
Discharge: HOME | End: 2024-05-08
Payer: MEDICARE

## 2024-05-08 ENCOUNTER — OFFICE VISIT (OUTPATIENT)
Dept: PRIMARY CARE | Facility: CLINIC | Age: 84
End: 2024-05-08
Payer: MEDICARE

## 2024-05-08 VITALS
HEART RATE: 84 BPM | OXYGEN SATURATION: 96 % | HEIGHT: 72 IN | BODY MASS INDEX: 22.62 KG/M2 | SYSTOLIC BLOOD PRESSURE: 136 MMHG | WEIGHT: 167 LBS | DIASTOLIC BLOOD PRESSURE: 80 MMHG

## 2024-05-08 DIAGNOSIS — N28.9 RENAL INSUFFICIENCY: ICD-10-CM

## 2024-05-08 DIAGNOSIS — N18.32 STAGE 3B CHRONIC KIDNEY DISEASE (MULTI): ICD-10-CM

## 2024-05-08 DIAGNOSIS — N40.1 BENIGN PROSTATIC HYPERPLASIA WITH NOCTURIA: ICD-10-CM

## 2024-05-08 DIAGNOSIS — R10.13 EPIGASTRIC PAIN: ICD-10-CM

## 2024-05-08 DIAGNOSIS — N28.9 RENAL INSUFFICIENCY: Primary | ICD-10-CM

## 2024-05-08 DIAGNOSIS — R11.11 VOMITING WITHOUT NAUSEA, UNSPECIFIED VOMITING TYPE: ICD-10-CM

## 2024-05-08 DIAGNOSIS — R12 HEARTBURN: Primary | ICD-10-CM

## 2024-05-08 DIAGNOSIS — C61 PROSTATE CANCER (MULTI): Primary | ICD-10-CM

## 2024-05-08 DIAGNOSIS — R35.1 BENIGN PROSTATIC HYPERPLASIA WITH NOCTURIA: ICD-10-CM

## 2024-05-08 PROBLEM — U07.1 COVID-19: Status: RESOLVED | Noted: 2023-10-31 | Resolved: 2024-05-08

## 2024-05-08 PROBLEM — Z85.46 HISTORY OF PROSTATE CANCER: Status: ACTIVE | Noted: 2023-10-31

## 2024-05-08 PROBLEM — Z86.16 PERSONAL HISTORY OF COVID-19: Status: RESOLVED | Noted: 2022-01-20 | Resolved: 2024-05-08

## 2024-05-08 LAB
POC APPEARANCE, URINE: CLEAR
POC BILIRUBIN, URINE: NEGATIVE
POC BLOOD, URINE: NEGATIVE
POC COLOR, URINE: YELLOW
POC GLUCOSE, URINE: NEGATIVE MG/DL
POC KETONES, URINE: NEGATIVE MG/DL
POC LEUKOCYTES, URINE: NEGATIVE
POC NITRITE,URINE: NEGATIVE
POC PH, URINE: 6 PH
POC PROTEIN, URINE: ABNORMAL MG/DL
POC SPECIFIC GRAVITY, URINE: >=1.03
POC UROBILINOGEN, URINE: 0.2 EU/DL
PROT SERPL-MCNC: 7.3 G/DL (ref 6.4–8.2)

## 2024-05-08 PROCEDURE — 1036F TOBACCO NON-USER: CPT | Performed by: NURSE PRACTITIONER

## 2024-05-08 PROCEDURE — 99213 OFFICE O/P EST LOW 20 MIN: CPT | Performed by: NURSE PRACTITIONER

## 2024-05-08 PROCEDURE — 1159F MED LIST DOCD IN RCRD: CPT | Performed by: NURSE PRACTITIONER

## 2024-05-08 PROCEDURE — 3075F SYST BP GE 130 - 139MM HG: CPT | Performed by: INTERNAL MEDICINE

## 2024-05-08 PROCEDURE — 81003 URINALYSIS AUTO W/O SCOPE: CPT | Mod: 91 | Performed by: NURSE PRACTITIONER

## 2024-05-08 PROCEDURE — 1159F MED LIST DOCD IN RCRD: CPT | Performed by: INTERNAL MEDICINE

## 2024-05-08 PROCEDURE — 51798 US URINE CAPACITY MEASURE: CPT | Performed by: NURSE PRACTITIONER

## 2024-05-08 PROCEDURE — 99215 OFFICE O/P EST HI 40 MIN: CPT | Performed by: INTERNAL MEDICINE

## 2024-05-08 PROCEDURE — 1160F RVW MEDS BY RX/DR IN RCRD: CPT | Performed by: NURSE PRACTITIONER

## 2024-05-08 PROCEDURE — 3079F DIAST BP 80-89 MM HG: CPT | Performed by: INTERNAL MEDICINE

## 2024-05-08 PROCEDURE — 76770 US EXAM ABDO BACK WALL COMP: CPT | Performed by: RADIOLOGY

## 2024-05-08 PROCEDURE — 76770 US EXAM ABDO BACK WALL COMP: CPT

## 2024-05-08 PROCEDURE — 1160F RVW MEDS BY RX/DR IN RCRD: CPT | Performed by: INTERNAL MEDICINE

## 2024-05-08 RX ORDER — OMEPRAZOLE 40 MG/1
40 CAPSULE, DELAYED RELEASE ORAL
Qty: 30 CAPSULE | Refills: 3 | Status: SHIPPED | OUTPATIENT
Start: 2024-05-08

## 2024-05-08 ASSESSMENT — ENCOUNTER SYMPTOMS
ABDOMINAL PAIN: 1
WEAKNESS: 1
SHORTNESS OF BREATH: 0
DIZZINESS: 0
APPETITE CHANGE: 1
FATIGUE: 1
DIARRHEA: 1
VOMITING: 1
ABDOMINAL DISTENTION: 1

## 2024-05-08 NOTE — PROGRESS NOTES
Subjective   Patient ID: Levon Mckay is a 84 y.o. male who presents for Follow-up (Nausea, vomiting for the last 2 months) and chronic medical problems.    Vomiting and diarrhea since 3-12-24.  Felt bloated.  Last occurred on 5-6-24.  Bms not consistent.  Usually 1-2 meals daily.  Injured right arm 2 months ago after fall on concrete floor.  Bruising resolved.  ROM seems intact.  Nocturia seems better with meds.  Now 2-3 times with sanctura and tamsulosin.  Labs reviewed.  GFR down to 30.  Not drinking liquids.  Urine is yellow.  Pepcid as needed.  Heartburn with acid taste in mouth.  Meds and labs reviewed.         Review of Systems   Constitutional:  Positive for appetite change and fatigue.   Respiratory:  Negative for shortness of breath.    Cardiovascular:  Negative for chest pain.   Gastrointestinal:  Positive for abdominal distention, abdominal pain, diarrhea and vomiting.   Neurological:  Positive for weakness. Negative for dizziness.       Objective   /80 (BP Location: Right arm, Patient Position: Sitting)   Pulse 84   Ht 1.829 m (6')   Wt 75.8 kg (167 lb)   SpO2 96%   BMI 22.65 kg/m²     Physical Exam  Constitutional:       Appearance: Normal appearance.   Cardiovascular:      Rate and Rhythm: Normal rate and regular rhythm.      Pulses: Normal pulses.      Heart sounds: Normal heart sounds.   Pulmonary:      Effort: Pulmonary effort is normal.      Breath sounds: Normal breath sounds.   Abdominal:      General: There is distension.      Tenderness: There is abdominal tenderness.   Neurological:      Mental Status: He is alert.      Motor: Weakness present.      Gait: Gait abnormal.   Psychiatric:         Mood and Affect: Mood normal.         Behavior: Behavior normal.         Thought Content: Thought content normal.         Judgment: Judgment normal.         Assessment/Plan   Problem List Items Addressed This Visit             ICD-10-CM    Stage 3b chronic kidney disease (Multi) N18.32      GFR decreased, now at 30.  Discussed hydration.  Check fu labs.  Will fu with urology.           Relevant Orders    Serum Protein Electrophoresis + Immunofixation    Urine Protein Electrophoresis + Immunofixation    US renal complete (Completed)    Heartburn - Primary R12     Rec omeprazole 40 mg daily or pepcid 40 mg daily.  Discussed dietary changes.         Relevant Medications    omeprazole (PriLOSEC) 40 mg DR capsule    Epigastric pain R10.13     Discussed PPI or H2 blocker, dietary changes.  ? Med side effects.  GI workup if no improvement.         Relevant Medications    omeprazole (PriLOSEC) 40 mg DR capsule    Renal insufficiency N28.9     Rec stat renal US today.         Relevant Orders    Urinalysis with Reflex Microscopic    Urine Culture    US renal complete (Completed)    Vomiting without nausea R11.11     ? Due to increased acid.  Treatment as previously noted.         Relevant Medications    omeprazole (PriLOSEC) 40 mg DR capsule

## 2024-05-11 LAB
ALBUMIN: 4.6 G/DL (ref 3.4–5)
ALPHA 1 GLOBULIN: 0.3 G/DL (ref 0.2–0.6)
ALPHA 2 GLOBULIN: 0.7 G/DL (ref 0.4–1.1)
BETA GLOBULIN: 0.8 G/DL (ref 0.5–1.2)
GAMMA GLOBULIN: 0.9 G/DL (ref 0.5–1.4)
IMMUNOFIXATION COMMENT: NORMAL
PATH REVIEW - SERUM IMMUNOFIXATION: NORMAL
PATH REVIEW-SERUM PROTEIN ELECTROPHORESIS: NORMAL
PROTEIN ELECTROPHORESIS COMMENT: NORMAL

## 2024-05-15 ENCOUNTER — APPOINTMENT (OUTPATIENT)
Dept: PRIMARY CARE | Facility: CLINIC | Age: 84
End: 2024-05-15
Payer: MEDICARE

## 2024-05-16 ENCOUNTER — LAB (OUTPATIENT)
Dept: LAB | Facility: LAB | Age: 84
End: 2024-05-16
Payer: MEDICARE

## 2024-05-16 DIAGNOSIS — C61 MALIGNANT NEOPLASM OF PROSTATE (MULTI): ICD-10-CM

## 2024-05-16 DIAGNOSIS — N28.9 RENAL INSUFFICIENCY: ICD-10-CM

## 2024-05-16 DIAGNOSIS — N18.32 STAGE 3B CHRONIC KIDNEY DISEASE (MULTI): ICD-10-CM

## 2024-05-16 PROCEDURE — 81001 URINALYSIS AUTO W/SCOPE: CPT

## 2024-05-16 PROCEDURE — 86325 OTHER IMMUNOELECTROPHORESIS: CPT | Performed by: INTERNAL MEDICINE

## 2024-05-16 PROCEDURE — 86335 IMMUNFIX E-PHORSIS/URINE/CSF: CPT

## 2024-05-16 PROCEDURE — 84153 ASSAY OF PSA TOTAL: CPT

## 2024-05-16 PROCEDURE — 84166 PROTEIN E-PHORESIS/URINE/CSF: CPT

## 2024-05-16 PROCEDURE — 80048 BASIC METABOLIC PNL TOTAL CA: CPT

## 2024-05-16 PROCEDURE — 36415 COLL VENOUS BLD VENIPUNCTURE: CPT

## 2024-05-16 PROCEDURE — 84166 PROTEIN E-PHORESIS/URINE/CSF: CPT | Performed by: INTERNAL MEDICINE

## 2024-05-16 PROCEDURE — 84156 ASSAY OF PROTEIN URINE: CPT

## 2024-05-17 LAB
ANION GAP SERPL CALC-SCNC: 10 MMOL/L (ref 10–20)
APPEARANCE UR: CLEAR
BILIRUB UR STRIP.AUTO-MCNC: NEGATIVE MG/DL
BUN SERPL-MCNC: 23 MG/DL (ref 6–23)
CALCIUM SERPL-MCNC: 9 MG/DL (ref 8.6–10.6)
CHLORIDE SERPL-SCNC: 109 MMOL/L (ref 98–107)
CO2 SERPL-SCNC: 28 MMOL/L (ref 21–32)
COLOR UR: ABNORMAL
CREAT SERPL-MCNC: 1.52 MG/DL (ref 0.5–1.3)
EGFRCR SERPLBLD CKD-EPI 2021: 45 ML/MIN/1.73M*2
GLUCOSE SERPL-MCNC: 92 MG/DL (ref 74–99)
GLUCOSE UR STRIP.AUTO-MCNC: NORMAL MG/DL
KETONES UR STRIP.AUTO-MCNC: NEGATIVE MG/DL
LEUKOCYTE ESTERASE UR QL STRIP.AUTO: NEGATIVE
MUCOUS THREADS #/AREA URNS AUTO: NORMAL /LPF
NITRITE UR QL STRIP.AUTO: NEGATIVE
PH UR STRIP.AUTO: 5.5 [PH]
POTASSIUM SERPL-SCNC: 5 MMOL/L (ref 3.5–5.3)
PROT UR STRIP.AUTO-MCNC: ABNORMAL MG/DL
PROT UR-ACNC: 90 MG/DL (ref 5–25)
PSA SERPL-MCNC: <0.1 NG/ML
RBC # UR STRIP.AUTO: NEGATIVE /UL
RBC #/AREA URNS AUTO: NORMAL /HPF
SODIUM SERPL-SCNC: 142 MMOL/L (ref 136–145)
SP GR UR STRIP.AUTO: 1.01
UROBILINOGEN UR STRIP.AUTO-MCNC: NORMAL MG/DL
WBC #/AREA URNS AUTO: NORMAL /HPF

## 2024-05-22 ENCOUNTER — OFFICE VISIT (OUTPATIENT)
Dept: PRIMARY CARE | Facility: CLINIC | Age: 84
End: 2024-05-22
Payer: MEDICARE

## 2024-05-22 VITALS
WEIGHT: 169.6 LBS | HEIGHT: 72 IN | HEART RATE: 81 BPM | BODY MASS INDEX: 22.97 KG/M2 | OXYGEN SATURATION: 97 % | DIASTOLIC BLOOD PRESSURE: 76 MMHG | SYSTOLIC BLOOD PRESSURE: 136 MMHG

## 2024-05-22 DIAGNOSIS — R12 HEARTBURN: ICD-10-CM

## 2024-05-22 DIAGNOSIS — N28.9 RENAL INSUFFICIENCY: Primary | ICD-10-CM

## 2024-05-22 LAB
ALBUMIN MFR UR ELPH: 86 %
ALPHA1 GLOB MFR UR ELPH: 1.7 %
ALPHA2 GLOB MFR UR ELPH: 2.2 %
B-GLOBULIN MFR UR ELPH: 5.3 %
GAMMA GLOB MFR UR ELPH: 4.8 %
IMMUNOFIXATION COMMENT: NORMAL
PATH REVIEW - URINE IMMUNOFIXATION: NORMAL
PATH REVIEW-URINE PROTEIN ELECTROPHORESIS: NORMAL
URINE ELECTROPHORESIS COMMENT: NORMAL

## 2024-05-22 PROCEDURE — 1159F MED LIST DOCD IN RCRD: CPT | Performed by: NURSE PRACTITIONER

## 2024-05-22 PROCEDURE — 99213 OFFICE O/P EST LOW 20 MIN: CPT | Performed by: NURSE PRACTITIONER

## 2024-05-22 PROCEDURE — 1036F TOBACCO NON-USER: CPT | Performed by: NURSE PRACTITIONER

## 2024-05-22 PROCEDURE — 1160F RVW MEDS BY RX/DR IN RCRD: CPT | Performed by: NURSE PRACTITIONER

## 2024-05-22 PROCEDURE — 3075F SYST BP GE 130 - 139MM HG: CPT | Performed by: NURSE PRACTITIONER

## 2024-05-22 PROCEDURE — 3078F DIAST BP <80 MM HG: CPT | Performed by: NURSE PRACTITIONER

## 2024-05-22 PROCEDURE — 1126F AMNT PAIN NOTED NONE PRSNT: CPT | Performed by: NURSE PRACTITIONER

## 2024-05-22 ASSESSMENT — ENCOUNTER SYMPTOMS
DIARRHEA: 0
CONSTITUTIONAL NEGATIVE: 1
RESPIRATORY NEGATIVE: 1
NAUSEA: 0
ABDOMINAL PAIN: 0
NEUROLOGICAL NEGATIVE: 1
ABDOMINAL DISTENTION: 0
CARDIOVASCULAR NEGATIVE: 1
CONSTIPATION: 0
FEVER: 0

## 2024-05-22 ASSESSMENT — PAIN SCALES - GENERAL: PAINLEVEL: 0-NO PAIN

## 2024-05-22 NOTE — PROGRESS NOTES
Subjective   Patient ID: Levon Mckay is a 84 y.o. male who presents for Follow-up (2w fu from lov with RB 5/8/24. Labs 5/16/24. No future ov scheduled. Per care giver Bismark, pt was to hydrate more and have kidney function checked(labs).  Wanting to see if the results on last labs were due to lack of hydration. ) and GERD (Also following up on 7 days of taking Prilosec for his heart burn. Pt reports no negative side affects since taking it. His gas has since stopped and his heart burn has gone away almost completely. ).    HPI   Patient of Dr Bear here for follow up decrease kidney function and reflux. Last office visit on 05/08/2024. Patient in company of caregiver (Bismark)  Current Concerns:  1) labs 05/16/2024 urine protein elevated (70), CMP, kidney function baseline (creatinine 1.52, eGFR 45, BUN 23), urine protein studies no abnormalities.  Dr Bear had advised if no improvement consider Nephrology referral  Caregiver reports Levon had seen urologist that same day and completed in-office PVR imaging noting it was 60 ml, urologist not concerned-  PVR under 100 ml recommended.   Bismark reports Levon is hydrating better throughout the day.    2) Prilosec has improved symptoms of bloating.  Has been on for 7 days, did have only one break-through two nights ago since starting the medication.   Chronic Concerns: BPH, CVA, Depression with anxiety, HTN, gait instability, Low back pain, thrombocytopenia, renal insufficiency, OAB.   Specialist   - Urology    Review of Systems   Constitutional: Negative.  Negative for fever.   Respiratory: Negative.     Cardiovascular: Negative.    Gastrointestinal:  Negative for abdominal distention (has improved as noted in HPI), abdominal pain, constipation, diarrhea and nausea.   Genitourinary:         As noted in HPI   Neurological: Negative.        Objective   /76 (BP Location: Right arm, Patient Position: Sitting, BP Cuff Size: Adult)   Pulse 81   Ht 1.829 m (6')   Wt  76.9 kg (169 lb 9.6 oz)   SpO2 97%   BMI 23.00 kg/m²     Physical Exam  Vitals reviewed.   Constitutional:       Appearance: He is normal weight.   Pulmonary:      Effort: Pulmonary effort is normal.   Skin:     General: Skin is warm.   Neurological:      Mental Status: He is alert. Mental status is at baseline.   Psychiatric:         Mood and Affect: Mood normal.         Behavior: Behavior normal.         Judgment: Judgment normal.       Assessment/Plan   Diagnoses and all orders for this visit:  Renal insufficiency- Reviewed labs, discussed baseline vs normals.   Hydration is key for keeping kidneys functioning normally or at baseline.  Heartburn- reviewed possible etiologies of reflux- at this time only assumptions. Patient has decreased coffee intake, reviewed other potential triggers- foods that stimulate acid- red sauce, spicy foods, caffeine, chocolate, mint, alcohol, fatty foods.   Recommended probiotic for a week.  Discussed weaning from Prilosec if symptoms had completely resolved in one month, if symptoms return then return to using Prilosec and keep Dr Bear aware of ongoing issue.    PLAN: Follow up with Dr Bear November (6 month routine)  Call back if further concerns

## 2024-05-22 NOTE — PATIENT INSTRUCTIONS
Hydration is key for keeping kidneys functioning normally.  Avoid foods that stimulate acid- red sauce, spicy foods, caffeine, chocolate, mint, alcohol, fatty foods.

## 2024-06-24 ENCOUNTER — LAB (OUTPATIENT)
Dept: LAB | Facility: LAB | Age: 84
End: 2024-06-24
Payer: MEDICARE

## 2024-06-24 DIAGNOSIS — C61 PROSTATE CANCER (MULTI): ICD-10-CM

## 2024-06-24 PROCEDURE — 84403 ASSAY OF TOTAL TESTOSTERONE: CPT

## 2024-06-24 PROCEDURE — 84153 ASSAY OF PSA TOTAL: CPT

## 2024-06-24 PROCEDURE — 36415 COLL VENOUS BLD VENIPUNCTURE: CPT

## 2024-06-25 ENCOUNTER — TELEMEDICINE (OUTPATIENT)
Dept: HEMATOLOGY/ONCOLOGY | Facility: HOSPITAL | Age: 84
End: 2024-06-25
Payer: MEDICARE

## 2024-06-25 DIAGNOSIS — C61 PROSTATE CANCER (MULTI): Primary | ICD-10-CM

## 2024-06-25 LAB
PSA SERPL-MCNC: 0.11 NG/ML
TESTOST SERPL-MCNC: 749 NG/DL (ref 240–1000)

## 2024-06-25 PROCEDURE — 99214 OFFICE O/P EST MOD 30 MIN: CPT | Performed by: STUDENT IN AN ORGANIZED HEALTH CARE EDUCATION/TRAINING PROGRAM

## 2024-06-25 ASSESSMENT — ENCOUNTER SYMPTOMS
EXTREMITY WEAKNESS: 0
HEMATURIA: 0
ROS GI COMMENTS: HEARTBURN
HOT FLASHES: 0
HEMATOLOGIC/LYMPHATIC NEGATIVE: 1
FREQUENCY: 1
SLEEP DISTURBANCE: 1
UNEXPECTED WEIGHT CHANGE: 0
APPETITE CHANGE: 0
MUSCULOSKELETAL NEGATIVE: 1
FATIGUE: 0
DIFFICULTY URINATING: 0
FEVER: 0
DECREASED CONCENTRATION: 1

## 2024-06-25 NOTE — PROGRESS NOTES
Patient ID: Levon Mckay is a 84 y.o. male.  Diagnosis:  Prostate Cancer   [facility]: Dr. Geoff Peacock: Dr. Caballero  Urology: Dr. Escobedo     Patient Care Team:  Ilir Bear MD as PCP - General  JESSE Dwyer-CNP as Nurse Practitioner (Hematology and Oncology)  Raymon Tellez MD as Medical Oncologist (Hematology and Oncology)    Current Therapy: Surveillance     ONCOLOGIC HISTORY    6/28/22 Elevated PSA of 12.61   7/5/22 PSA rechecked 15.9.   11/2/22 MRI prostate PIRADS 5   12/20/22 Biopsy (Cintia 7 (3+4) disease, up to 70% of tissue involved in tumor)   1/2023 - PSMA reveals R iliac bone met  2/7/23 - ADT 6 months started. plan for XRT prostate and SBRT iliac bone  3/14/23 - XRT started  6/29/23 - PSA < 0.1 T < 10  6/24/24 - PSA 0.11 -- T 749  --  82-year-old gentleman with newly diagnosed prostate cancer, cT1c, GG2 (50% of cores+, 1/1+ targeted), iPSA 15.9.  11/2/2022 MRI prostate noted a 31.6 cc gland, PI-RADS 5 lesion in the left PZ posteriorly at mid gland with extension into the left TZ.  Broad capsular abutment with focal bulging and capsular irregularity, but no gross RUCHI.  No evidence of SV invasion.  No lymphadenopathy.  12/20/2022 transperineal biopsy noted Henrietta 3+4 in 50% of the cores, Cintia 3+4 in 1/1 targeted core.  Henrietta 4 involvement ranges 5 to 15%.  No evidence of intraductal carcinoma or cribriform pattern.    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  Surgical History:  Levon has a past surgical history that includes Other surgical history (06/28/2022); Other surgical history (06/28/2022); and Other surgical history (12/07/2022).  Social History:  Levon reports that he has never smoked. He has never been exposed to tobacco smoke. He has never used  smokeless tobacco. He reports that he does not currently use alcohol. He reports that he does not use drugs.  Family History:  No family history on file.  Family Oncology History:  Cancer-related family history is not on file.    Review of Systems   Constitutional:  Negative for appetite change, fatigue, fever and unexpected weight change.   Gastrointestinal:         Heartburn   Endocrine: Negative for hot flashes.   Genitourinary:  Positive for frequency and nocturia. Negative for difficulty urinating and hematuria.    Musculoskeletal: Negative.  Negative for gait problem.   Neurological:  Negative for extremity weakness and gait problem.   Hematological: Negative.    Psychiatric/Behavioral:  Positive for decreased concentration and sleep disturbance.      Allergies  Allergies   Allergen Reactions    Other Unknown      Medications  Current Outpatient Medications   Medication Instructions    amLODIPine (Norvasc) 5 mg tablet 1 tablet, oral, Daily    aspirin 81 mg capsule 1 capsule, Every 24 hours    atorvastatin (Lipitor) 20 mg tablet TAKE 1 TABLET BY MOUTH ONCE  DAILY AS DIRECTED    lisinopril 20 mg tablet TAKE 1 TABLET BY MOUTH DAILY    omeprazole (PRILOSEC) 40 mg, oral, Daily before breakfast, Do not crush or chew.    psyllium (Metamucil) 0.4 gram capsule 5 capsules, oral, 4 times daily    sertraline (Zoloft) 100 mg tablet TAKE 1 TABLET BY MOUTH DAILY    tamsulosin (Flomax) 0.4 mg 24 hr capsule TAKE 1 CAPSULE BY MOUTH DAILY    trospium (SANCTURA) 20 mg, oral, 2 times daily        OBJECTIVE:    VS / Pain:  There were no vitals taken for this visit.  BSA: There is no height or weight on file to calculate BSA.  Wt Readings from Last 5 Encounters:   05/22/24 76.9 kg (169 lb 9.6 oz)   05/08/24 75.8 kg (167 lb)   04/03/24 74.8 kg (165 lb)   01/17/24 74.8 kg (165 lb)   01/09/24 76.7 kg (169 lb)       Diagnostic Results   Results for orders placed or performed in visit on 06/24/24 (from the past 96 hour(s))   Prostate  Specific Antigen   Result Value Ref Range    Prostate Specific AG 0.11 <=4.00 ng/mL   Testosterone   Result Value Ref Range    Testosterone 749 240 - 1,000 ng/dL     Lab Results   Component Value Date    PSA 0.11 06/24/2024    PSA <0.10 05/16/2024    PSA <0.10 03/25/2024     Lab Results   Component Value Date    TESTOSTERONE 749 06/24/2024       Assessment/Plan   83 yo  man (PMH stroke 20y ago) ECOG 2, diagnosed with int risk PC (iPSA 15 / Gl 7) with oligmet bone R iliac bone. 6 months ADT and XRT prostate and bone completed with undetectable PSA / low T levels.   Again, with M1-directed therapy and short term ADT, given age / comorbidities we are comfortable holding therapy. Will continue to monitor with PSA/T in few months  I saw and evaluated the patient. I personally obtained the key and critical portions of the history and physical exam or was physically present for key and critical portions performed by the resident/fellow. I reviewed the resident/fellow's documentation and discussed the patient with the resident/fellow. I agree with the resident/fellow's medical decision making as documented in the note.   Raymon Tellez MD MSc FACP  [facility] Cancer Research   in Medicine [employer]  Director Clinical  Medical Oncology Research Program   [facility]  Cell [phone]  Office [phone]

## 2024-06-27 ENCOUNTER — APPOINTMENT (OUTPATIENT)
Dept: HEMATOLOGY/ONCOLOGY | Facility: CLINIC | Age: 84
End: 2024-06-27
Payer: MEDICARE

## 2024-07-16 DIAGNOSIS — F41.8 OTHER SPECIFIED ANXIETY DISORDERS: ICD-10-CM

## 2024-07-17 RX ORDER — SERTRALINE HYDROCHLORIDE 100 MG/1
TABLET, FILM COATED ORAL
Qty: 90 TABLET | Refills: 1 | Status: SHIPPED | OUTPATIENT
Start: 2024-07-17

## 2024-08-06 DIAGNOSIS — I10 ESSENTIAL HYPERTENSION: Primary | ICD-10-CM

## 2024-08-06 RX ORDER — AMLODIPINE BESYLATE 5 MG/1
5 TABLET ORAL DAILY
Qty: 90 TABLET | Refills: 3 | Status: SHIPPED | OUTPATIENT
Start: 2024-08-06

## 2024-08-20 ENCOUNTER — APPOINTMENT (OUTPATIENT)
Dept: PRIMARY CARE | Facility: CLINIC | Age: 84
End: 2024-08-20
Payer: MEDICARE

## 2024-08-20 VITALS
HEART RATE: 78 BPM | OXYGEN SATURATION: 96 % | SYSTOLIC BLOOD PRESSURE: 182 MMHG | HEIGHT: 72 IN | WEIGHT: 168 LBS | BODY MASS INDEX: 22.75 KG/M2 | DIASTOLIC BLOOD PRESSURE: 86 MMHG

## 2024-08-20 DIAGNOSIS — I10 ESSENTIAL HYPERTENSION: ICD-10-CM

## 2024-08-20 DIAGNOSIS — F33.42 RECURRENT MAJOR DEPRESSIVE DISORDER, IN FULL REMISSION (CMS-HCC): ICD-10-CM

## 2024-08-20 DIAGNOSIS — M25.511 CHRONIC RIGHT SHOULDER PAIN: ICD-10-CM

## 2024-08-20 DIAGNOSIS — G89.29 CHRONIC RIGHT SHOULDER PAIN: ICD-10-CM

## 2024-08-20 DIAGNOSIS — Z00.00 ROUTINE GENERAL MEDICAL EXAMINATION AT HEALTH CARE FACILITY: Primary | ICD-10-CM

## 2024-08-20 DIAGNOSIS — N18.32 STAGE 3B CHRONIC KIDNEY DISEASE (MULTI): ICD-10-CM

## 2024-08-20 PROBLEM — Z85.828 HISTORY OF SKIN CANCER: Status: ACTIVE | Noted: 2023-06-26

## 2024-08-20 PROCEDURE — 3079F DIAST BP 80-89 MM HG: CPT | Performed by: INTERNAL MEDICINE

## 2024-08-20 PROCEDURE — 1160F RVW MEDS BY RX/DR IN RCRD: CPT | Performed by: INTERNAL MEDICINE

## 2024-08-20 PROCEDURE — 1170F FXNL STATUS ASSESSED: CPT | Performed by: INTERNAL MEDICINE

## 2024-08-20 PROCEDURE — 1036F TOBACCO NON-USER: CPT | Performed by: INTERNAL MEDICINE

## 2024-08-20 PROCEDURE — 99214 OFFICE O/P EST MOD 30 MIN: CPT | Performed by: INTERNAL MEDICINE

## 2024-08-20 PROCEDURE — G0439 PPPS, SUBSEQ VISIT: HCPCS | Performed by: INTERNAL MEDICINE

## 2024-08-20 PROCEDURE — 3077F SYST BP >= 140 MM HG: CPT | Performed by: INTERNAL MEDICINE

## 2024-08-20 PROCEDURE — 1159F MED LIST DOCD IN RCRD: CPT | Performed by: INTERNAL MEDICINE

## 2024-08-20 ASSESSMENT — ENCOUNTER SYMPTOMS
HOPELESSNESS: 0
SLEEP QUALITY: GOOD
NERVOUS/ANXIOUS: 0
NIGHTTIME AWAKENINGS: ONE TO TWO
PALPITATIONS: 0
DEPRESSED MOOD: 0
HYPERTENSION: 1
FATIGUE: 0
PANIC: 0
DIZZINESS: 0
HOURS OF SLEEP PER NIGHT: 10 HOURS
DEPRESSION: 1
FEELINGS OF WORTHLESSNESS: 0
MALAISE: 0
IRRITABILITY: 0

## 2024-08-20 ASSESSMENT — ACTIVITIES OF DAILY LIVING (ADL)
GROCERY_SHOPPING: NEEDS ASSISTANCE
DOING_HOUSEWORK: INDEPENDENT
TAKING_MEDICATION: INDEPENDENT
MANAGING_FINANCES: INDEPENDENT
DRESSING: INDEPENDENT
BATHING: INDEPENDENT

## 2024-08-20 ASSESSMENT — PATIENT HEALTH QUESTIONNAIRE - PHQ9
SUM OF ALL RESPONSES TO PHQ9 QUESTIONS 1 AND 2: 0
2. FEELING DOWN, DEPRESSED OR HOPELESS: NOT AT ALL
1. LITTLE INTEREST OR PLEASURE IN DOING THINGS: NOT AT ALL

## 2024-08-20 NOTE — PROGRESS NOTES
Subjective   Reason for Visit: Levon Mckay is an 84 y.o. male here for a Medicare Wellness visit and chronic medical problems.    Past Medical, Surgical, and Family History reviewed and updated in chart.    Reviewed all medications by prescribing practitioner or clinical pharmacist (such as prescriptions, OTCs, herbal therapies and supplements) and documented in the medical record.    Here with family member.  Right shoulder pain, dull ache during the day.  Worse at night when laying on side.  Oncology following prostate cancer.  Meds and labs reviewed.    Hypertension  This is a chronic problem. The current episode started more than 1 year ago. The problem has been waxing and waning since onset. The problem is uncontrolled. Pertinent negatives include no palpitations. The current treatment provides moderate improvement.   Depression  Visit Type: follow-up  Patient is not experiencing: depressed mood, excessive worry, fatigue, feelings of hopelessness, feelings of worthlessness, irritability, malaise, nervousness/anxiety, palpitations and panic.  Frequency of symptoms: rarely    Sleep per night: 10 hours  Sleep quality: good  Nighttime awakenings: one to two  Compliance with medications:  %        Patient Care Team:  Ilir Bear MD as PCP - General  JESSE Dwyer-CNP as Nurse Practitioner (Hematology and Oncology)  Raymon Tellez MD as Medical Oncologist (Hematology and Oncology)     Review of Systems   Constitutional:  Negative for fatigue and irritability.   Cardiovascular:  Negative for palpitations.   Musculoskeletal:         Shoulder pain.   Neurological:  Negative for dizziness.   Psychiatric/Behavioral:  Positive for depression. The patient is not nervous/anxious.        Objective   Vitals:  BP (!) 182/86 (BP Location: Right arm, Patient Position: Sitting)   Pulse 78   Ht 1.829 m (6')   Wt 76.2 kg (168 lb)   SpO2 96%   BMI 22.78 kg/m²       Physical Exam  Constitutional:        Appearance: Normal appearance.   Cardiovascular:      Rate and Rhythm: Normal rate and regular rhythm.      Pulses: Normal pulses.      Heart sounds: Normal heart sounds.   Pulmonary:      Effort: Pulmonary effort is normal.      Breath sounds: Normal breath sounds.   Musculoskeletal:         General: No swelling or tenderness. Normal range of motion.      Comments: Right shoulder with normal ROM.   Neurological:      General: No focal deficit present.      Mental Status: He is alert.   Psychiatric:         Mood and Affect: Mood normal.         Behavior: Behavior normal.         Thought Content: Thought content normal.         Judgment: Judgment normal.         Assessment/Plan   Problem List Items Addressed This Visit             ICD-10-CM    Essential hypertension I10     BP not at goal with current medications.  Rec low salt diet.  Check BP at home, goal < 140/90.  Discussed compliance with BP meds.         Stage 3b chronic kidney disease (Multi) N18.32     GFR improved 45, UPE reviewed.         Relevant Orders    Basic metabolic panel    Recurrent major depressive disorder, in full remission (CMS-HCC) F33.42     Mood improved/estephanie with sertraline.         Routine general medical examination at health care facility - Primary Z00.00    Relevant Orders    1 Year Follow Up In Primary Care - Wellness Exam    Chronic right shoulder pain M25.511, G89.29     S/p fall 5 months ago.  Rec shoulder xrays.  Discussed ROM HEP.             Relevant Orders    XR shoulder right 2+ views

## 2024-08-20 NOTE — ASSESSMENT & PLAN NOTE
BP not at goal with current medications.  Rec low salt diet.  Check BP at home, goal < 140/90.  Discussed compliance with BP meds.

## 2024-08-22 ENCOUNTER — TELEPHONE (OUTPATIENT)
Dept: RADIATION ONCOLOGY | Facility: HOSPITAL | Age: 84
End: 2024-08-22
Payer: MEDICARE

## 2024-08-22 ENCOUNTER — HOSPITAL ENCOUNTER (OUTPATIENT)
Dept: RADIOLOGY | Facility: CLINIC | Age: 84
Discharge: HOME | End: 2024-08-22
Payer: MEDICARE

## 2024-08-22 DIAGNOSIS — M25.511 CHRONIC RIGHT SHOULDER PAIN: ICD-10-CM

## 2024-08-22 DIAGNOSIS — G89.29 CHRONIC RIGHT SHOULDER PAIN: ICD-10-CM

## 2024-08-22 PROCEDURE — 73030 X-RAY EXAM OF SHOULDER: CPT | Mod: RT

## 2024-08-22 ASSESSMENT — ENCOUNTER SYMPTOMS
ABDOMINAL PAIN: 0
JOINT SWELLING: 0
PSYCHIATRIC NEGATIVE: 1
HEMATURIA: 0
DIFFICULTY URINATING: 0
DIARRHEA: 0
RECTAL PAIN: 0
SHORTNESS OF BREATH: 0
BLOOD IN STOOL: 0
DIZZINESS: 0
PALPITATIONS: 0
ARTHRALGIAS: 0
ANAL BLEEDING: 0
UNEXPECTED WEIGHT CHANGE: 0
CHEST TIGHTNESS: 0
FATIGUE: 0
CONSTIPATION: 0
DYSURIA: 0
BACK PAIN: 0
COUGH: 0
ALLERGIC/IMMUNOLOGIC NEGATIVE: 1
WEAKNESS: 0
FEVER: 0
FREQUENCY: 0

## 2024-08-22 NOTE — PROGRESS NOTES
Cancer synopsis:  Rad/onc: Juan Carlos MCMAHONP  Uroloig: Dr. Escobedo/ Taurus VAUGHN  Med/onc: Dr. 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   Prostate radiation was recommended, details below. ADT managed by Dr. Tellez.     Treatment Area #1  Location : Prostate_SV  Dates : 3/13/23 - 4/7/23  Number of Treatments : 20  Dose : 5500 cGy  Modality : VMAT    Treatment Area #1  Location : R iliac bone metastasis  Dates : 4/20/23 - 4/27/23  Number of Treatments : 3  Dose : 3000cGy  Modality : SBRT     Clinical Summary :  He tolerated this course of radiation  therapy relatively well, with no unusual events or unanticipated toxicities.  Symptoms during treatment included increased urinary urgency and frequency, managed with flomax, cialis, and myrbetriq. This has worsened his baseline incontinence 2/2 stroke.     History of presenting illness:    Patient ID: [id number]     Levon Mckay is a 84 y.o. male who presents for his de-luisito metastatic prostate cancer M1b per PET/PSMA. Currently completing 6m of ADT (last 02/2023), received SBRT to R iliac and prostate/SV/LN via VMAT 05/2023     RT Site: prostate, SV and r iliac bone  RT Date: 04/2023  Hormone therapy: Yes  Hot Flushes: Denies  Fatigue: Denies  Bone pain:  Reports ongoing left shoulder pain, had XR w/ dr. Hazel and is planning to FUV with PCP. Denies pain in hip.   EDUARD: virtual  ED: Not sexually active per patient.  ED medications: No  IPSS: virtual  Urinary symptoms: Denies dysuria or hematuria. Reports frequency and nocturia have increased vastly since RT. Reports nocturia now at once a night.  Reports mild urgency. Admits to weak stream. Recently started tropsium with Dr. Escobedo. Patient reports helping with frequency and night time sleeping.  Urinary Medications: Yes,  flowmax daily. Tropsium daily  Rectal bleeding: Denies  Colonoscopy: None on file  Other systems: Denies SOB, CP or fever.    Review of systems:  Review of Systems   Constitutional:  Negative for fatigue, fever and unexpected weight change.   Respiratory:  Negative for cough, chest tightness and shortness of breath.    Cardiovascular:  Negative for chest pain, palpitations and leg swelling.   Gastrointestinal:  Negative for abdominal pain, anal bleeding, blood in stool, constipation, diarrhea and rectal pain.   Endocrine: Negative for cold intolerance, heat intolerance and polyuria.   Genitourinary:  Negative for decreased urine volume, difficulty urinating, dysuria, frequency, hematuria and urgency.   Musculoskeletal:  Negative for arthralgias, back pain, gait problem and joint swelling.   Skin: Negative.    Allergic/Immunologic: Negative.    Neurological:  Negative for dizziness, syncope and weakness.   Psychiatric/Behavioral: Negative.       Past Medical history  No past medical history on file.     Surgical/family history  No family history on file.   Past Surgical History:   Procedure Laterality Date    OTHER SURGICAL HISTORY  06/28/2022    Appendectomy    OTHER SURGICAL HISTORY  06/28/2022    Hip surgery    OTHER SURGICAL HISTORY  12/07/2022    Tonsillectomy with adenoidectomy      Social History  Tobacco Use: Low Risk  (8/20/2024)    Patient History     Smoking Tobacco Use: Never     Smokeless Tobacco Use: Never     Passive Exposure: Never       Current med list:  Current Outpatient Medications   Medication Instructions    amLODIPine (NORVASC) 5 mg, oral, Daily    aspirin 81 mg capsule 1 capsule, Every 24 hours    atorvastatin (Lipitor) 20 mg tablet TAKE 1 TABLET BY MOUTH ONCE  DAILY AS DIRECTED    lisinopril 20 mg tablet TAKE 1 TABLET BY MOUTH DAILY    omeprazole (PRILOSEC) 40 mg, oral, Daily before breakfast, Do not crush or chew.    psyllium (Metamucil) 0.4 gram capsule 5 capsules, oral, 4 times daily     sertraline (Zoloft) 100 mg tablet TAKE 1 TABLET BY MOUTH DAILY    tamsulosin (Flomax) 0.4 mg 24 hr capsule TAKE 1 CAPSULE BY MOUTH DAILY    trospium (SANCTURA) 20 mg, oral, 2 times daily      Last recorded vital:  Virtual    Physical exam  Virtual    Pertinent labs:  Prostate Specific AG   Date/Time Value Ref Range Status   06/24/2024 02:02 PM 0.11 <=4.00 ng/mL Final     Dx:  Problem List Items Addressed This Visit    None  PSA of 0.11 was reviewed and is stable, testosterone is now 749 and within normal range. Review of latent SE including rectal bleeding, hematuria, urinary strictures, ED where reviewed as well as how to contact office if s/s present. Denies latent SE.      Recommend vitamin D supplementation, start (or increase by) 400-1000 units daily. Reviewed importance of intake while on Testerone lowering therapy as well as after until Testerone re-establishes, at which point may stop if DEXA indicative of normal bone density. Also reviewed that individuals at a higher risk such as those over the age of 75 or those with a hx of bone fx, osteoporosis or osteopenia to continue supplementation indefinitely with routine DEXA imaging as per national guidelines to assess bone health continually.    PLAN:  FUV 6m  Labs PSA in 6m  Imaging none  FUV other providers: PCP for routine evals, urology, med/onc as scheduled    Please contact office with any concerns:  Hong Cardona CNP  [phone]    I performed this visit using realtime telehealth tools, including an audio/video OR telephone connection between patient’s name and location Levon Mckay and Hong Rangel CNP.    2. POS 10: Telehealth provided in patient's home.  o Patient is located in their home (which is a location other than a hospital or other facility where the patient receives care in a private residence) when receiving health services or health related services through telecommunication technology.

## 2024-08-22 NOTE — TELEPHONE ENCOUNTER
Called pt. to remind of appointment on 8/23/2024 at 2:00 pm with JOHANA Rangel. Pt's phone went to voicemail left number if needs to reschedule.

## 2024-08-23 ENCOUNTER — HOSPITAL ENCOUNTER (OUTPATIENT)
Dept: RADIATION ONCOLOGY | Facility: HOSPITAL | Age: 84
Setting detail: RADIATION/ONCOLOGY SERIES
Discharge: HOME | End: 2024-08-23
Payer: MEDICARE

## 2024-08-23 DIAGNOSIS — C61 MALIGNANT NEOPLASM OF PROSTATE (MULTI): Primary | ICD-10-CM

## 2024-08-23 PROCEDURE — 99213 OFFICE O/P EST LOW 20 MIN: CPT

## 2024-08-26 DIAGNOSIS — M12.811 ROTATOR CUFF ARTHROPATHY OF RIGHT SHOULDER: ICD-10-CM

## 2024-08-26 DIAGNOSIS — G89.29 CHRONIC RIGHT SHOULDER PAIN: Primary | ICD-10-CM

## 2024-08-26 DIAGNOSIS — M25.511 CHRONIC RIGHT SHOULDER PAIN: Primary | ICD-10-CM

## 2024-09-18 ENCOUNTER — OFFICE VISIT (OUTPATIENT)
Dept: ORTHOPEDIC SURGERY | Facility: CLINIC | Age: 84
End: 2024-09-18
Payer: MEDICARE

## 2024-09-18 DIAGNOSIS — M25.511 CHRONIC RIGHT SHOULDER PAIN: ICD-10-CM

## 2024-09-18 DIAGNOSIS — G89.29 CHRONIC RIGHT SHOULDER PAIN: ICD-10-CM

## 2024-09-18 DIAGNOSIS — M12.811 ROTATOR CUFF ARTHROPATHY OF RIGHT SHOULDER: ICD-10-CM

## 2024-09-18 PROCEDURE — 2500000004 HC RX 250 GENERAL PHARMACY W/ HCPCS (ALT 636 FOR OP/ED): Performed by: STUDENT IN AN ORGANIZED HEALTH CARE EDUCATION/TRAINING PROGRAM

## 2024-09-18 PROCEDURE — 99214 OFFICE O/P EST MOD 30 MIN: CPT | Performed by: STUDENT IN AN ORGANIZED HEALTH CARE EDUCATION/TRAINING PROGRAM

## 2024-09-18 PROCEDURE — 2500000005 HC RX 250 GENERAL PHARMACY W/O HCPCS: Performed by: STUDENT IN AN ORGANIZED HEALTH CARE EDUCATION/TRAINING PROGRAM

## 2024-09-18 PROCEDURE — 20610 DRAIN/INJ JOINT/BURSA W/O US: CPT | Mod: RT | Performed by: STUDENT IN AN ORGANIZED HEALTH CARE EDUCATION/TRAINING PROGRAM

## 2024-09-18 RX ORDER — LIDOCAINE HYDROCHLORIDE 10 MG/ML
5 INJECTION, SOLUTION INFILTRATION; PERINEURAL
Status: COMPLETED | OUTPATIENT
Start: 2024-09-18 | End: 2024-09-18

## 2024-09-18 RX ORDER — TRIAMCINOLONE ACETONIDE 40 MG/ML
80 INJECTION, SUSPENSION INTRA-ARTICULAR; INTRAMUSCULAR
Status: COMPLETED | OUTPATIENT
Start: 2024-09-18 | End: 2024-09-18

## 2024-09-18 NOTE — PROGRESS NOTES
CHIEF COMPLAINT: No chief complaint on file.    History: 84 y.o. male presents to the office today for evaluation of his right shoulder.  Is accompanied by his caretaker.  He is in a wheelchair today.  The patient did have a stroke in the past with affected most of the left side of his body.  The patient had a fall about 5 months ago and injured his right shoulder.  Since that time he is having significant pain in the shoulder, particularly at night and having difficulty sleeping on that side.  Has not had any specific treatment yet.    Past medical history, past surgical history, medications, allergies, family history, social history, and review of systems were reviewed today.    A 12 point review of systems was negative other than as stated in the HPI.    No past medical history on file.     Allergies   Allergen Reactions    Other Unknown        Past Surgical History:   Procedure Laterality Date    OTHER SURGICAL HISTORY  06/28/2022    Appendectomy    OTHER SURGICAL HISTORY  06/28/2022    Hip surgery    OTHER SURGICAL HISTORY  12/07/2022    Tonsillectomy with adenoidectomy        No family history on file.     Social History     Socioeconomic History    Marital status: Single     Spouse name: Not on file    Number of children: Not on file    Years of education: Not on file    Highest education level: Not on file   Occupational History    Not on file   Tobacco Use    Smoking status: Never     Passive exposure: Never    Smokeless tobacco: Never   Substance and Sexual Activity    Alcohol use: Not Currently    Drug use: Never    Sexual activity: Defer   Other Topics Concern    Not on file   Social History Narrative    Not on file     Social Determinants of Health     Financial Resource Strain: Not on file   Food Insecurity: Not on file   Transportation Needs: Not on file   Physical Activity: Not on file   Stress: Not on file   Social Connections: Not on file   Intimate Partner Violence: Not on file   Housing  Stability: Not on file        CURRENT MEDICATIONS:   Current Outpatient Medications   Medication Sig Dispense Refill    amLODIPine (Norvasc) 5 mg tablet Take 1 tablet (5 mg) by mouth once daily. 90 tablet 3    aspirin 81 mg capsule 1 capsule once every 24 hours.      atorvastatin (Lipitor) 20 mg tablet TAKE 1 TABLET BY MOUTH ONCE  DAILY AS DIRECTED 90 tablet 3    lisinopril 20 mg tablet TAKE 1 TABLET BY MOUTH DAILY 90 tablet 3    omeprazole (PriLOSEC) 40 mg DR capsule Take 1 capsule (40 mg) by mouth once daily in the morning. Take before meals. Do not crush or chew. 30 capsule 3    psyllium (Metamucil) 0.4 gram capsule Take 5 capsules by mouth 4 times a day.      sertraline (Zoloft) 100 mg tablet TAKE 1 TABLET BY MOUTH DAILY 90 tablet 1    tamsulosin (Flomax) 0.4 mg 24 hr capsule TAKE 1 CAPSULE BY MOUTH DAILY 90 capsule 3    trospium (Sanctura) 20 mg tablet Take 1 tablet (20 mg) by mouth 2 times a day. 60 tablet 11     No current facility-administered medications for this visit.       Physical Examination:      1/9/2024    11:44 AM 1/17/2024    10:06 AM 4/3/2024     9:29 AM 5/8/2024     8:10 AM 5/22/2024    10:00 AM 8/20/2024     2:34 PM 8/20/2024     2:44 PM   Vitals   Systolic 164   136 136 188 182   Diastolic 80   80 76 90 86   Heart Rate    84 81 78    Height (in)  1.829 m (6') 1.829 m (6') 1.829 m (6') 1.829 m (6') 1.829 m (6')    Weight (lb)  165 165 167 169.6 168    BMI  22.38 kg/m2 22.38 kg/m2 22.65 kg/m2 23 kg/m2 22.78 kg/m2    BSA (m2)  1.95 m2 1.95 m2 1.96 m2 1.98 m2 1.97 m2    Visit Report Report Report Report Report    Report Report Report Report      There is no height or weight on file to calculate BMI.    Well-appearing, appears stated age, pleasant and cooperative, appropriate mood and behavior. Height and weight reviewed. Alert and oriented x3.  Auditory function intact.  No acute distress.  Intact ocular function, CARRIE, EOMI. Breathing is unlabored .  There is no evidence of jugular venous  distension. Skin appearance is normal without evidence of rash or other lesions. 2+ radial pulses bilaterally, fingers pink and wwp, good capillary refill, no pitting edema. No appreciable lymphadenopathy in bilateral upper extremities. SILT throughout both upper extremities, median/radial/ulnar/musculocutaneous/axillary nerve motor and sensory intact (except for abnormalities noted in focused musculoskeletal exam section below).     Neck exam: Full range of motion of the neck in flexion/extension and rotational movements. No significant areas of tenderness to palpation in the neck.    On exam of bilateral upper extremities, limited range of motion of the left shoulder secondary to his stroke.  The left he has forward flexion to 90, external rotation to 20.  On the right he has active forward flexion 160, external Tatian 60, internal Tatian to T12.  Full strength rotator cuff strength testing.  Pain with Neer and Gerardo maneuvers of the right shoulder.    Imaging: Radiographs of the right shoulder performed today.  Person interpreted by myself.  There does appear to be calcium within the rotator cuff.  Otherwise has preserved glenohumeral joint space.    Assessment: Right rotator cuff injury    Plan: Patient's symptoms, test result and exam are consistent with a diagnosis of rotator cuff disease.  We did discuss the natural history of this.  Conservative management is often the first-line treatment for this, which includes physical therapy, injections and activity modification.  In rare cases of recalcitrant pain, surgery may be indicated, but this is only after extensive nonoperative care.  Patient understands this.  They wish to proceed with injection.    Injection was performed, please see separate procedure note, patient tolerated well.     Patient ID: Levon Mckay is a 84 y.o. male.    L Inj/Asp: R subacromial bursa on 9/18/2024 1:10 PM  Indications: pain  Details: 22 G needle, posterior  approach  Medications: 80 mg triamcinolone acetonide 40 mg/mL; 5 mL lidocaine 10 mg/mL (1 %)  Outcome: tolerated well, no immediate complications  Procedure, treatment alternatives, risks and benefits explained, specific risks discussed. Consent was given by the patient. Immediately prior to procedure a time out was called to verify the correct patient, procedure, equipment, support staff and site/side marked as required. Patient was prepped and draped in the usual sterile fashion.             Dragon software was used to dictate this note, please be aware that minor errors in transcription may be present.    Ilir Jane MD    Shoulder/Elbow Surgery  [facility]

## 2024-09-23 ENCOUNTER — LAB (OUTPATIENT)
Dept: LAB | Facility: LAB | Age: 84
End: 2024-09-23
Payer: MEDICARE

## 2024-09-23 DIAGNOSIS — C61 PROSTATE CANCER (MULTI): ICD-10-CM

## 2024-09-23 DIAGNOSIS — N18.32 STAGE 3B CHRONIC KIDNEY DISEASE (MULTI): ICD-10-CM

## 2024-09-23 PROCEDURE — 80048 BASIC METABOLIC PNL TOTAL CA: CPT

## 2024-09-23 PROCEDURE — 84153 ASSAY OF PSA TOTAL: CPT

## 2024-09-23 PROCEDURE — 84270 ASSAY OF SEX HORMONE GLOBUL: CPT

## 2024-09-23 PROCEDURE — 36415 COLL VENOUS BLD VENIPUNCTURE: CPT

## 2024-09-24 LAB
ANION GAP SERPL CALC-SCNC: 13 MMOL/L (ref 10–20)
BUN SERPL-MCNC: 25 MG/DL (ref 6–23)
CALCIUM SERPL-MCNC: 9.1 MG/DL (ref 8.6–10.6)
CHLORIDE SERPL-SCNC: 108 MMOL/L (ref 98–107)
CO2 SERPL-SCNC: 28 MMOL/L (ref 21–32)
CREAT SERPL-MCNC: 1.53 MG/DL (ref 0.5–1.3)
EGFRCR SERPLBLD CKD-EPI 2021: 45 ML/MIN/1.73M*2
GLUCOSE SERPL-MCNC: 93 MG/DL (ref 74–99)
POTASSIUM SERPL-SCNC: 4.3 MMOL/L (ref 3.5–5.3)
PSA SERPL-MCNC: <0.1 NG/ML
SODIUM SERPL-SCNC: 145 MMOL/L (ref 136–145)

## 2024-09-27 LAB — TESTOSTERONE,TOTAL,LC-MS/MS: 554 NG/DL (ref 250–1100)

## 2024-10-01 ENCOUNTER — TELEMEDICINE (OUTPATIENT)
Dept: HEMATOLOGY/ONCOLOGY | Facility: HOSPITAL | Age: 84
End: 2024-10-01
Payer: MEDICARE

## 2024-10-01 DIAGNOSIS — C61 PROSTATE CANCER (MULTI): Primary | ICD-10-CM

## 2024-10-01 PROCEDURE — 1159F MED LIST DOCD IN RCRD: CPT | Performed by: STUDENT IN AN ORGANIZED HEALTH CARE EDUCATION/TRAINING PROGRAM

## 2024-10-01 PROCEDURE — 1160F RVW MEDS BY RX/DR IN RCRD: CPT | Performed by: STUDENT IN AN ORGANIZED HEALTH CARE EDUCATION/TRAINING PROGRAM

## 2024-10-01 PROCEDURE — 99214 OFFICE O/P EST MOD 30 MIN: CPT | Mod: 95 | Performed by: STUDENT IN AN ORGANIZED HEALTH CARE EDUCATION/TRAINING PROGRAM

## 2024-10-01 PROCEDURE — 99214 OFFICE O/P EST MOD 30 MIN: CPT | Performed by: STUDENT IN AN ORGANIZED HEALTH CARE EDUCATION/TRAINING PROGRAM

## 2024-10-01 ASSESSMENT — ENCOUNTER SYMPTOMS
FATIGUE: 0
UNEXPECTED WEIGHT CHANGE: 0
EXTREMITY WEAKNESS: 0
SLEEP DISTURBANCE: 1
HEMATOLOGIC/LYMPHATIC NEGATIVE: 1
HOT FLASHES: 0
MUSCULOSKELETAL NEGATIVE: 1
DIFFICULTY URINATING: 0
DECREASED CONCENTRATION: 1
ROS GI COMMENTS: HEARTBURN
FEVER: 0
FREQUENCY: 1
APPETITE CHANGE: 0
HEMATURIA: 0

## 2024-10-01 NOTE — PROGRESS NOTES
Patient ID: Levon Mckay is a 84 y.o. male.  Diagnosis:  Prostate Cancer   [facility] Dr[facility] Peac[facility]: Dr. Caballero  Urology: Dr. Escobedo     Patient Care Team:  Ilir Bear MD as PCP - General  Ilir Bear MD as PCP - [facility] Provider  JESSE Dwyer-CNP as Nurse Practitioner (Hematology and Oncology)  Raymon Tellez MD as Medical Oncologist (Hematology and Oncology)    Current Therapy: Surveillance     ONCOLOGIC HISTORY  6/28/22 Elevated PSA of 12.61   7/5/22 PSA rechecked 15.9.   11/2/22 MRI prostate PIRADS 5   12/20/22 Biopsy (Darragh 7 (3+4) disease, up to 70% of tissue involved in tumor)   1/2023 - PSMA reveals R iliac bone met  2/7/23 - ADT 6 months started. plan for XRT prostate and SBRT iliac bone  3/14/23 - XRT started  6/29/23 - PSA < 0.1 T < 10  6/24/24 - PSA 0.11 -- T 749  10/1/24 - PSA<0.1 -- T 554  --  82-year-old gentleman with newly diagnosed prostate cancer, cT1c, GG2 (50% of cores+, 1/1+ targeted), iPSA 15.9.  11/2/2022 MRI prostate noted a 31.6 cc gland, PI-RADS 5 lesion in the left PZ posteriorly at mid gland with extension into the left TZ.  Broad capsular abutment with focal bulging and capsular irregularity, but no gross RUCHI.  No evidence of SV invasion.  No lymphadenopathy.  12/20/2022 transperineal biopsy noted Cintia 3+4 in 50% of the cores, Darragh 3+4 in 1/1 targeted core.  Cintia 4 involvement ranges 5 to 15%.  No evidence of intraductal carcinoma or cribriform pattern.  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  Surgical History:  Levon has a past surgical history that includes Other surgical history (06/28/2022); Other surgical history (06/28/2022); and Other surgical history (12/07/2022).  Social History:  Levon ojeda  that he has never smoked. He has never been exposed to tobacco smoke. He has never used smokeless tobacco. He reports that he does not currently use alcohol. He reports that he does not use drugs.  Family History:  No family history on file.  Family Oncology History:  Cancer-related family history is not on file.    Review of Systems   Constitutional:  Negative for appetite change, fatigue, fever and unexpected weight change.   Gastrointestinal:         Heartburn   Endocrine: Negative for hot flashes.   Genitourinary:  Positive for frequency and nocturia. Negative for difficulty urinating and hematuria.    Musculoskeletal: Negative.  Negative for gait problem.   Neurological:  Negative for extremity weakness and gait problem.   Hematological: Negative.    Psychiatric/Behavioral:  Positive for decreased concentration and sleep disturbance.      Allergies  Allergies   Allergen Reactions    Other Unknown      Medications  Current Outpatient Medications   Medication Instructions    amLODIPine (NORVASC) 5 mg, oral, Daily    aspirin 81 mg capsule 1 capsule, Every 24 hours    atorvastatin (Lipitor) 20 mg tablet TAKE 1 TABLET BY MOUTH ONCE  DAILY AS DIRECTED    lisinopril 20 mg tablet TAKE 1 TABLET BY MOUTH DAILY    omeprazole (PRILOSEC) 40 mg, oral, Daily before breakfast, Do not crush or chew.    psyllium (Metamucil) 0.4 gram capsule 5 capsules, oral, 4 times daily    sertraline (Zoloft) 100 mg tablet TAKE 1 TABLET BY MOUTH DAILY    tamsulosin (Flomax) 0.4 mg 24 hr capsule TAKE 1 CAPSULE BY MOUTH DAILY    trospium (SANCTURA) 20 mg, oral, 2 times daily        OBJECTIVE:    VS / Pain:  There were no vitals taken for this visit.  BSA: There is no height or weight on file to calculate BSA.  Wt Readings from Last 5 Encounters:   08/20/24 76.2 kg (168 lb)   05/22/24 76.9 kg (169 lb 9.6 oz)   05/08/24 75.8 kg (167 lb)   04/03/24 74.8 kg (165 lb)   01/17/24 74.8 kg (165 lb)       Diagnostic Results   No results found for this or  any previous visit (from the past 96 hour(s)).    Lab Results   Component Value Date    PSA <0.10 09/23/2024    PSA 0.11 06/24/2024    PSA <0.10 05/16/2024     Lab Results   Component Value Date    TESTOSTERONE 749 06/24/2024       Assessment/Plan   83 yo  man (PMH stroke 20y ago) ECOG 2, diagnosed with int risk PC (iPSA 15 / Gl 7) with oligmet bone R iliac bone. 6 months ADT and XRT prostate and bone completed with undetectable PSA / recovered T levels.   Again, with M1-directed therapy and short term ADT, given age / comorbidities we are comfortable holding therapy. Will continue to monitor with PSA/T in few months.  I saw and evaluated the patient. I personally obtained the key and critical portions of the history and physical exam or was physically present for key and critical portions performed by the resident/fellow. I reviewed the resident/fellow's documentation and discussed the patient with the resident/fellow. I agree with the resident/fellow's medical decision making as documented in the note.   Raymon Tellez MD MSc FACP  [facility] in Cancer Research   in Medicine [facility] [employer]  Director Clinical  Medical Oncology Research Program   [facility]  Cell [phone]  Office [phone]

## 2024-10-31 DIAGNOSIS — R35.0 URINARY FREQUENCY: ICD-10-CM

## 2024-10-31 DIAGNOSIS — E78.2 HYPERLIPIDEMIA, MIXED: ICD-10-CM

## 2024-11-01 RX ORDER — TAMSULOSIN HYDROCHLORIDE 0.4 MG/1
CAPSULE ORAL
Qty: 90 CAPSULE | Refills: 3 | Status: SHIPPED | OUTPATIENT
Start: 2024-11-01

## 2024-11-01 RX ORDER — ATORVASTATIN CALCIUM 20 MG/1
TABLET, FILM COATED ORAL
Qty: 90 TABLET | Refills: 3 | Status: SHIPPED | OUTPATIENT
Start: 2024-11-01

## 2024-11-13 ENCOUNTER — OFFICE VISIT (OUTPATIENT)
Dept: UROLOGY | Facility: CLINIC | Age: 84
End: 2024-11-13
Payer: MEDICARE

## 2024-11-13 DIAGNOSIS — C61 PROSTATE CANCER (MULTI): Primary | ICD-10-CM

## 2024-11-13 DIAGNOSIS — R35.0 URINARY FREQUENCY: ICD-10-CM

## 2024-11-13 DIAGNOSIS — N40.1 BENIGN PROSTATIC HYPERPLASIA WITH NOCTURIA: ICD-10-CM

## 2024-11-13 DIAGNOSIS — R35.1 BENIGN PROSTATIC HYPERPLASIA WITH NOCTURIA: ICD-10-CM

## 2024-11-13 PROCEDURE — 1160F RVW MEDS BY RX/DR IN RCRD: CPT | Performed by: NURSE PRACTITIONER

## 2024-11-13 PROCEDURE — 51798 US URINE CAPACITY MEASURE: CPT | Performed by: NURSE PRACTITIONER

## 2024-11-13 PROCEDURE — 1159F MED LIST DOCD IN RCRD: CPT | Performed by: NURSE PRACTITIONER

## 2024-11-13 PROCEDURE — 99213 OFFICE O/P EST LOW 20 MIN: CPT | Performed by: NURSE PRACTITIONER

## 2024-11-13 PROCEDURE — 1036F TOBACCO NON-USER: CPT | Performed by: NURSE PRACTITIONER

## 2024-11-13 RX ORDER — TAMSULOSIN HYDROCHLORIDE 0.4 MG/1
0.4 CAPSULE ORAL DAILY
Qty: 90 CAPSULE | Refills: 3 | Status: SHIPPED | OUTPATIENT
Start: 2024-11-13

## 2024-11-13 RX ORDER — TROSPIUM CHLORIDE 20 MG/1
20 TABLET, FILM COATED ORAL 2 TIMES DAILY
Qty: 180 TABLET | Refills: 3 | Status: SHIPPED | OUTPATIENT
Start: 2024-11-13 | End: 2025-11-13

## 2024-11-13 NOTE — PROGRESS NOTES
[facility] [place]  Outpatient Clinic Note    Subjective   Levon Mckay is a 84 y.o. male    History of Present Illness   Patient presenting to clinic today for 6 month FUV. History of BPH wth Nocturia, Prostate Cancer s/p EBRT and Leuprolide injection, CKD3, Urinary frequency, and urgency. Symptoms have improved significantly since starting Trospium last visit. NTF was up to 10x, now NTF 1 to 3x. Daytime frequency not bothersome. Also taking Tamsulosin 0.4 mg daily He drinks mostly tea and coffee in the morning and afternoon, limits fluids in the evening      Urinalysis today; Unable to leave sample   PVR 50 ml     Lab Results   Component Value Date    PSA <0.10 09/23/2024    PSA 0.11 06/24/2024    PSA <0.10 05/16/2024    PSA <0.10 03/25/2024    PSA <0.10 12/21/2023    PSA <0.10 09/19/2023    PSA <0.10 06/23/2023    PSA 8.91 (H) 03/17/2023    PSA 12.31 (H) 01/24/2023    PSA 15.9 (H) 07/05/2022     Past Medical History and Surgical History   No past medical history on file.  Past Surgical History:   Procedure Laterality Date    OTHER SURGICAL HISTORY  06/28/2022    Appendectomy    OTHER SURGICAL HISTORY  06/28/2022    Hip surgery    OTHER SURGICAL HISTORY  12/07/2022    Tonsillectomy with adenoidectomy       Medications  Current Outpatient Medications on File Prior to Visit   Medication Sig Dispense Refill    amLODIPine (Norvasc) 5 mg tablet Take 1 tablet (5 mg) by mouth once daily. 90 tablet 3    aspirin 81 mg capsule 1 capsule once every 24 hours.      atorvastatin (Lipitor) 20 mg tablet TAKE 1 TABLET BY MOUTH ONCE  DAILY AS DIRECTED 90 tablet 3    lisinopril 20 mg tablet TAKE 1 TABLET BY MOUTH DAILY 90 tablet 3    omeprazole (PriLOSEC) 40 mg DR capsule Take 1 capsule (40 mg) by mouth once daily in the morning. Take before meals. Do not crush or chew. 30 capsule 3    psyllium (Metamucil) 0.4 gram capsule Take 5 capsules by mouth 4 times a day.      sertraline (Zoloft) 100 mg tablet TAKE 1 TABLET BY MOUTH DAILY  90 tablet 1    tamsulosin (Flomax) 0.4 mg 24 hr capsule TAKE 1 CAPSULE BY MOUTH DAILY 90 capsule 3    trospium (Sanctura) 20 mg tablet Take 1 tablet (20 mg) by mouth 2 times a day. 60 tablet 11     No current facility-administered medications on file prior to visit.       Objective   Physicial Exam  General: Well developed, well nourished, alert and cooperative, appears in no acute distress  Eyes: Non-injected conjunctiva, sclera clear, no proptosis  Cardiac: Extremities are warm and well perfused. No edema, cyanosis or pallor.   Lungs: Breathing is easy, non-labored. Speaking in clear and complete sentences. Normal diaphragmatic movement.  MSK: Ambulatory with steady gait, unassisted  Neuro: alert and oriented to person, place and time  Psych: Demonstrates good judgement and reason, without hallucinations, abnormal affect or abnormal behaviors.  Skin: no obvious lesions, no rashes.    No visits with results within 30 Day(s) from this visit.   Latest known visit with results is:   Lab on 09/23/2024   Component Date Value Ref Range Status    Testosterone, Total, LC-MS/MS 09/23/2024 554  250 - 1100 ng/dL Final       Men with clinically significant hypogonadal  symptoms and testosterone values repeatedly in  the range of the 200-300 ng/dL or less, may  benefit from testosterone treatment after  adequate risk and benefits counseling.            For additional information, please refer to  http://education.[employer]/faq/  [email or web address]  (This link is being provided for informational/  educational purposes only.)     This test was developed and its analytical performance  characteristics have been determined by [employer] [place], [place]. It has  not been cleared or approved by the U.S. Food and Drug  Administration. This assay has been validated pursuant  to the CLIA regulations and is used for clinical  purposes.       Prostate Specific AG 09/23/2024 <0.10  <=4.00 ng/mL  Final    Glucose 09/23/2024 93  74 - 99 mg/dL Final    Sodium 09/23/2024 145  136 - 145 mmol/L Final    Potassium 09/23/2024 4.3  3.5 - 5.3 mmol/L Final    Chloride 09/23/2024 108 (H)  98 - 107 mmol/L Final    Bicarbonate 09/23/2024 28  21 - 32 mmol/L Final    Anion Gap 09/23/2024 13  10 - 20 mmol/L Final    Urea Nitrogen 09/23/2024 25 (H)  6 - 23 mg/dL Final    Creatinine 09/23/2024 1.53 (H)  0.50 - 1.30 mg/dL Final    eGFR 09/23/2024 45 (L)  >60 mL/min/1.73m*2 Final    Calculations of estimated GFR are performed using the 2021 CKD-EPI Study Refit equation without the race variable for the IDMS-Traceable creatinine methods.  https://jasn.asnjournals.org/content/early/2021/09/22/ASN.6358896455    Calcium 09/23/2024 9.1  8.6 - 10.6 mg/dL Final      Review of Systems  All other systems have been reviewed and are negative for complaint.      Assessment and Plan     BPH with LUTS   Continue Trospium 20 mg twice daily   I educated the patient on relevant lower urinary tract anatomy and physiology with emphasis on differential diagnosis as well as contributing factors to the patient's lower urinary tract symptoms. I educated the patient on dietary and behavioral modifications pertinent to the patient's complaints. I recommended to avoid caffeine, alcohol, spicy and acidic oral intake and to regulate fluid intake and voiding (timed voiding, double voiding). I stressed the importance of avoiding constipation and recommended stool softeners unless diarrhea present. We discussed limiting fluid intake at night and elevating legs prior to bedtime.     Continue Tamsulosin 0.4 mg and Trospium 20 mg twice daily     All questions and concerns were addressed. Patient verbalizes understanding and has no other questions at this time.     Emerita Condon-- JOHANA MOLINA  Office Phone:  [phone]

## 2024-11-14 ENCOUNTER — APPOINTMENT (OUTPATIENT)
Dept: PRIMARY CARE | Facility: CLINIC | Age: 84
End: 2024-11-14
Payer: MEDICARE

## 2024-11-14 VITALS
HEART RATE: 78 BPM | WEIGHT: 165 LBS | DIASTOLIC BLOOD PRESSURE: 86 MMHG | BODY MASS INDEX: 22.35 KG/M2 | OXYGEN SATURATION: 96 % | SYSTOLIC BLOOD PRESSURE: 164 MMHG | HEIGHT: 72 IN

## 2024-11-14 DIAGNOSIS — N18.32 STAGE 3B CHRONIC KIDNEY DISEASE (MULTI): ICD-10-CM

## 2024-11-14 DIAGNOSIS — E78.2 HYPERLIPIDEMIA, MIXED: ICD-10-CM

## 2024-11-14 DIAGNOSIS — I10 ESSENTIAL HYPERTENSION: Primary | ICD-10-CM

## 2024-11-14 DIAGNOSIS — Z91.148 POOR COMPLIANCE WITH MEDICATION: ICD-10-CM

## 2024-11-14 PROCEDURE — 1159F MED LIST DOCD IN RCRD: CPT | Performed by: INTERNAL MEDICINE

## 2024-11-14 PROCEDURE — 99214 OFFICE O/P EST MOD 30 MIN: CPT | Performed by: INTERNAL MEDICINE

## 2024-11-14 PROCEDURE — 1036F TOBACCO NON-USER: CPT | Performed by: INTERNAL MEDICINE

## 2024-11-14 PROCEDURE — 1124F ACP DISCUSS-NO DSCNMKR DOCD: CPT | Performed by: INTERNAL MEDICINE

## 2024-11-14 PROCEDURE — 3079F DIAST BP 80-89 MM HG: CPT | Performed by: INTERNAL MEDICINE

## 2024-11-14 PROCEDURE — 1160F RVW MEDS BY RX/DR IN RCRD: CPT | Performed by: INTERNAL MEDICINE

## 2024-11-14 PROCEDURE — 3077F SYST BP >= 140 MM HG: CPT | Performed by: INTERNAL MEDICINE

## 2024-11-14 RX ORDER — AMLODIPINE BESYLATE 5 MG/1
7.5 TABLET ORAL DAILY
Qty: 135 TABLET | Refills: 3 | Status: SHIPPED | OUTPATIENT
Start: 2024-11-14

## 2024-11-14 ASSESSMENT — ENCOUNTER SYMPTOMS
SHORTNESS OF BREATH: 0
MYALGIAS: 0
DIZZINESS: 0
ARTHRALGIAS: 1
HYPERTENSION: 1
FATIGUE: 0

## 2024-11-14 NOTE — PROGRESS NOTES
Subjective   Patient ID: Levon Mckay is a 84 y.o. male who presents for Follow-up chronic medical problems.    Not compliant with meds.  Misses about 2 doses a week.  Needs assistance with meds.  Discussed medication compliance strategies.  Meds and labs reviewed.    Hypertension  This is a chronic problem. The current episode started more than 1 year ago. The problem has been resolved since onset. The problem is uncontrolled. Pertinent negatives include no chest pain or shortness of breath. The current treatment provides moderate improvement. Identifiable causes of hypertension include chronic renal disease.   Hyperlipidemia  This is a chronic problem. The current episode started more than 1 year ago. The problem is controlled. Recent lipid tests were reviewed and are low. Exacerbating diseases include chronic renal disease. There are no known factors aggravating his hyperlipidemia. Pertinent negatives include no chest pain, myalgias or shortness of breath. Current antihyperlipidemic treatment includes statins. The current treatment provides significant improvement of lipids.        Review of Systems   Constitutional:  Negative for fatigue.   Respiratory:  Negative for shortness of breath.    Cardiovascular:  Negative for chest pain.   Musculoskeletal:  Positive for arthralgias. Negative for myalgias.   Neurological:  Negative for dizziness.       Objective   /86 (BP Location: Right arm, Patient Position: Sitting)   Pulse 78   Ht 1.829 m (6')   Wt 74.8 kg (165 lb)   SpO2 96%   BMI 22.38 kg/m²     Physical Exam  Constitutional:       Appearance: Normal appearance.   Neck:      Vascular: No carotid bruit.   Cardiovascular:      Rate and Rhythm: Normal rate and regular rhythm.      Pulses: Normal pulses.      Heart sounds: Normal heart sounds.   Pulmonary:      Effort: Pulmonary effort is normal.      Breath sounds: Normal breath sounds.   Neurological:      General: No focal deficit present.      Mental  Status: He is alert.      Gait: Gait abnormal.   Psychiatric:         Mood and Affect: Mood normal.         Behavior: Behavior normal.         Thought Content: Thought content normal.         Judgment: Judgment normal.         Assessment/Plan   Problem List Items Addressed This Visit             ICD-10-CM    Essential hypertension - Primary I10     BP not at goal with current medications.  Rec low salt diet.  Check BP at home, goal < 140/90.  Increase amlodipine to 7.5 mg daily.           Relevant Medications    amLODIPine (Norvasc) 5 mg tablet    Other Relevant Orders    Comprehensive metabolic panel    Urinalysis with Reflex Microscopic    Hyperlipidemia, mixed E78.2     Lipids, LDL at goal with statin.  Recommend low fat/cholesterol diet.           Relevant Orders    Comprehensive metabolic panel    Lipid panel    Stage 3b chronic kidney disease (Multi) N18.32     GFR 45, estephanie.  Followed here.         Relevant Orders    Comprehensive metabolic panel    Urinalysis with Reflex Microscopic    Poor compliance with medication Z91.148

## 2024-11-14 NOTE — ASSESSMENT & PLAN NOTE
BP not at goal with current medications.  Rec low salt diet.  Check BP at home, goal < 140/90.  Increase amlodipine to 7.5 mg daily.

## 2024-11-20 ENCOUNTER — LAB (OUTPATIENT)
Dept: LAB | Facility: LAB | Age: 84
End: 2024-11-20
Payer: MEDICARE

## 2024-11-20 DIAGNOSIS — E78.2 HYPERLIPIDEMIA, MIXED: ICD-10-CM

## 2024-11-20 DIAGNOSIS — C61 PROSTATE CANCER (MULTI): ICD-10-CM

## 2024-11-20 DIAGNOSIS — N18.32 STAGE 3B CHRONIC KIDNEY DISEASE (MULTI): ICD-10-CM

## 2024-11-20 DIAGNOSIS — I10 ESSENTIAL HYPERTENSION: ICD-10-CM

## 2024-11-20 PROCEDURE — 84153 ASSAY OF PSA TOTAL: CPT

## 2024-11-20 PROCEDURE — 84403 ASSAY OF TOTAL TESTOSTERONE: CPT

## 2024-11-20 PROCEDURE — 36415 COLL VENOUS BLD VENIPUNCTURE: CPT

## 2024-11-20 PROCEDURE — 81001 URINALYSIS AUTO W/SCOPE: CPT

## 2024-11-20 PROCEDURE — 80053 COMPREHEN METABOLIC PANEL: CPT

## 2024-11-20 PROCEDURE — 80061 LIPID PANEL: CPT

## 2024-11-21 LAB
ALBUMIN SERPL BCP-MCNC: 4.5 G/DL (ref 3.4–5)
ALP SERPL-CCNC: 117 U/L (ref 33–136)
ALT SERPL W P-5'-P-CCNC: 35 U/L (ref 10–52)
ANION GAP SERPL CALC-SCNC: 15 MMOL/L (ref 10–20)
APPEARANCE UR: CLEAR
AST SERPL W P-5'-P-CCNC: 24 U/L (ref 9–39)
BILIRUB SERPL-MCNC: 0.5 MG/DL (ref 0–1.2)
BILIRUB UR STRIP.AUTO-MCNC: NEGATIVE MG/DL
BUN SERPL-MCNC: 25 MG/DL (ref 6–23)
CALCIUM SERPL-MCNC: 8.9 MG/DL (ref 8.6–10.6)
CHLORIDE SERPL-SCNC: 108 MMOL/L (ref 98–107)
CHOLEST SERPL-MCNC: 139 MG/DL (ref 0–199)
CHOLESTEROL/HDL RATIO: 3.2
CO2 SERPL-SCNC: 27 MMOL/L (ref 21–32)
COLOR UR: ABNORMAL
CREAT SERPL-MCNC: 1.55 MG/DL (ref 0.5–1.3)
EGFRCR SERPLBLD CKD-EPI 2021: 44 ML/MIN/1.73M*2
GLUCOSE SERPL-MCNC: 78 MG/DL (ref 74–99)
GLUCOSE UR STRIP.AUTO-MCNC: NORMAL MG/DL
HDLC SERPL-MCNC: 43.8 MG/DL
KETONES UR STRIP.AUTO-MCNC: NEGATIVE MG/DL
LDLC SERPL CALC-MCNC: 83 MG/DL
LEUKOCYTE ESTERASE UR QL STRIP.AUTO: NEGATIVE
MUCOUS THREADS #/AREA URNS AUTO: NORMAL /LPF
NITRITE UR QL STRIP.AUTO: NEGATIVE
NON HDL CHOLESTEROL: 95 MG/DL (ref 0–149)
PH UR STRIP.AUTO: 5.5 [PH]
POTASSIUM SERPL-SCNC: 4.3 MMOL/L (ref 3.5–5.3)
PROT SERPL-MCNC: 6.5 G/DL (ref 6.4–8.2)
PROT UR STRIP.AUTO-MCNC: ABNORMAL MG/DL
PSA SERPL-MCNC: 0.12 NG/ML
RBC # UR STRIP.AUTO: NEGATIVE /UL
RBC #/AREA URNS AUTO: NORMAL /HPF
SODIUM SERPL-SCNC: 146 MMOL/L (ref 136–145)
SP GR UR STRIP.AUTO: 1.02
TESTOST SERPL-MCNC: 786 NG/DL (ref 240–1000)
TRIGL SERPL-MCNC: 61 MG/DL (ref 0–149)
UROBILINOGEN UR STRIP.AUTO-MCNC: NORMAL MG/DL
VLDL: 12 MG/DL (ref 0–40)
WBC #/AREA URNS AUTO: NORMAL /HPF

## 2024-11-26 ENCOUNTER — APPOINTMENT (OUTPATIENT)
Dept: OPHTHALMOLOGY | Facility: CLINIC | Age: 84
End: 2024-11-26
Payer: MEDICARE

## 2024-11-26 DIAGNOSIS — Z96.1 PSEUDOPHAKIA: Primary | ICD-10-CM

## 2024-11-26 DIAGNOSIS — H02.052 TRICHIASIS OF RIGHT LOWER EYELID: ICD-10-CM

## 2024-11-26 PROCEDURE — 99204 OFFICE O/P NEW MOD 45 MIN: CPT | Performed by: OPHTHALMOLOGY

## 2024-11-26 ASSESSMENT — CONF VISUAL FIELD
OS_NORMAL: 1
OS_INFERIOR_NASAL_RESTRICTION: 0
OD_SUPERIOR_TEMPORAL_RESTRICTION: 0
OD_SUPERIOR_NASAL_RESTRICTION: 0
OS_SUPERIOR_NASAL_RESTRICTION: 0
OD_INFERIOR_NASAL_RESTRICTION: 0
OD_NORMAL: 1
OD_INFERIOR_TEMPORAL_RESTRICTION: 0
OS_SUPERIOR_TEMPORAL_RESTRICTION: 0
OS_INFERIOR_TEMPORAL_RESTRICTION: 0

## 2024-11-26 ASSESSMENT — REFRACTION_MANIFEST
OS_CYLINDER: -0.25
OS_ADD: +3.00
OS_AXIS: 090
OD_CYLINDER: SPHERE
OD_SPHERE: +0.50
OD_ADD: +3.00
OS_SPHERE: +0.25

## 2024-11-26 ASSESSMENT — ENCOUNTER SYMPTOMS
HEMATOLOGIC/LYMPHATIC NEGATIVE: 0
RESPIRATORY NEGATIVE: 0
GASTROINTESTINAL NEGATIVE: 0
EYES NEGATIVE: 1
CONSTITUTIONAL NEGATIVE: 0
PSYCHIATRIC NEGATIVE: 0
ALLERGIC/IMMUNOLOGIC NEGATIVE: 0
ENDOCRINE NEGATIVE: 1
MUSCULOSKELETAL NEGATIVE: 0
CARDIOVASCULAR NEGATIVE: 0
NEUROLOGICAL NEGATIVE: 0

## 2024-11-26 ASSESSMENT — REFRACTION_WEARINGRX
OS_CYLINDER: -0.25
OS_ADD: +2.50
OD_AXIS: 056
OD_ADD: +2.50
SPECS_TYPE: PAL
OD_CYLINDER: -0.25
OS_AXIS: 089
OD_SPHERE: +0.50
OS_SPHERE: +0.25

## 2024-11-26 ASSESSMENT — EXTERNAL EXAM - RIGHT EYE: OD_EXAM: NORMAL

## 2024-11-26 ASSESSMENT — CUP TO DISC RATIO
OD_RATIO: 0.3
OS_RATIO: 0.3

## 2024-11-26 ASSESSMENT — VISUAL ACUITY
OS_CC: 20/20
METHOD: SNELLEN - LINEAR
OD_CC: 20/20
CORRECTION_TYPE: GLASSES

## 2024-11-26 ASSESSMENT — EXTERNAL EXAM - LEFT EYE: OS_EXAM: NORMAL

## 2024-11-26 ASSESSMENT — TONOMETRY
OD_IOP_MMHG: 14
IOP_METHOD: GOLDMANN APPLANATION
OS_IOP_MMHG: 14

## 2024-11-26 NOTE — PROGRESS NOTES
First visit  Eye injury with rubber band 70 years ago - OD, had something done to it    Pseudophakia OU  7-8 years ago in [place]  Mild PCO OD  Stable, monitor      2. Prediabetic   Dx at time of stroke, no meds  A1c 5.1  Stable , monitor    3. Trichiasis BLL  Denies irritation, states that he has had eyelashes plucked in the past  Uses his fingers every morning in shower to remove crust  Recommend use baby shampoo  Defer epilation today    4. New Mrx

## 2024-12-12 DIAGNOSIS — I10 PRIMARY HYPERTENSION: ICD-10-CM

## 2024-12-12 DIAGNOSIS — F41.8 OTHER SPECIFIED ANXIETY DISORDERS: ICD-10-CM

## 2024-12-12 RX ORDER — LISINOPRIL 20 MG/1
TABLET ORAL
Qty: 90 TABLET | Refills: 3 | Status: SHIPPED | OUTPATIENT
Start: 2024-12-12

## 2024-12-12 RX ORDER — SERTRALINE HYDROCHLORIDE 100 MG/1
TABLET, FILM COATED ORAL
Qty: 90 TABLET | Refills: 1 | Status: SHIPPED | OUTPATIENT
Start: 2024-12-12

## 2025-01-03 ENCOUNTER — LAB (OUTPATIENT)
Dept: LAB | Facility: LAB | Age: 85
End: 2025-01-03
Payer: MEDICARE

## 2025-01-03 DIAGNOSIS — C61 PROSTATE CANCER (MULTI): ICD-10-CM

## 2025-01-03 DIAGNOSIS — C61 MALIGNANT NEOPLASM OF PROSTATE (MULTI): ICD-10-CM

## 2025-01-03 LAB — PSA SERPL-MCNC: 0.14 NG/ML

## 2025-01-03 PROCEDURE — 84270 ASSAY OF SEX HORMONE GLOBUL: CPT

## 2025-01-03 PROCEDURE — 36415 COLL VENOUS BLD VENIPUNCTURE: CPT

## 2025-01-03 PROCEDURE — 84153 ASSAY OF PSA TOTAL: CPT

## 2025-01-06 ENCOUNTER — APPOINTMENT (OUTPATIENT)
Dept: HEMATOLOGY/ONCOLOGY | Facility: CLINIC | Age: 85
End: 2025-01-06
Payer: MEDICARE

## 2025-01-07 ENCOUNTER — TELEMEDICINE (OUTPATIENT)
Dept: HEMATOLOGY/ONCOLOGY | Facility: HOSPITAL | Age: 85
End: 2025-01-07
Payer: MEDICARE

## 2025-01-07 DIAGNOSIS — C61 PROSTATE CANCER (MULTI): Primary | ICD-10-CM

## 2025-01-07 PROCEDURE — 99214 OFFICE O/P EST MOD 30 MIN: CPT | Performed by: STUDENT IN AN ORGANIZED HEALTH CARE EDUCATION/TRAINING PROGRAM

## 2025-01-07 PROCEDURE — 1160F RVW MEDS BY RX/DR IN RCRD: CPT | Performed by: STUDENT IN AN ORGANIZED HEALTH CARE EDUCATION/TRAINING PROGRAM

## 2025-01-07 PROCEDURE — 1159F MED LIST DOCD IN RCRD: CPT | Performed by: STUDENT IN AN ORGANIZED HEALTH CARE EDUCATION/TRAINING PROGRAM

## 2025-01-07 ASSESSMENT — ENCOUNTER SYMPTOMS
DECREASED CONCENTRATION: 1
HEMATURIA: 0
FREQUENCY: 1
SLEEP DISTURBANCE: 1
MUSCULOSKELETAL NEGATIVE: 1
DIFFICULTY URINATING: 0
FATIGUE: 0
HEMATOLOGIC/LYMPHATIC NEGATIVE: 1
HOT FLASHES: 0
ROS GI COMMENTS: HEARTBURN
APPETITE CHANGE: 0
EXTREMITY WEAKNESS: 0
UNEXPECTED WEIGHT CHANGE: 0
FEVER: 0

## 2025-01-07 NOTE — PROGRESS NOTES
Patient ID: Levon Mckay is a 84 y.o. male.  Diagnosis:  Prostate Cancer   [facility]: Dr. Geoff Peacock: Dr. Caballero  Urology: Dr. Escobedo     Patient Care Team:  Ilir Bear MD as PCP - General  Il[facility] Bear MD as PCP [facility]  JESSE Dwyer-CNP as Nurse Practitioner (Hematology and Oncology)  Raymon Tellez MD as Medical Oncologist (Hematology and Oncology)    Current Therapy: Surveillance     ONCOLOGIC HISTORY  6/28/22 Elevated PSA of 12.61   7/5/22 PSA rechecked 15.9.   11/2/22 MRI prostate PIRADS 5   12/20/22 Biopsy (Cintia 7 (3+4) disease, up to 70% of tissue involved in tumor)   1/2023 - PSMA reveals R iliac bone met  2/7/23 - ADT 6 months started. plan for XRT prostate and SBRT iliac bone  3/14/23 - XRT started  6/29/23 - PSA < 0.1 T < 10  6/24/24 - PSA 0.11 -- T 749  10/1/24 - PSA<0.1 -- T 554  1/3/25 - PSA 0.14 -- T pending  --  82-year-old gentleman with newly diagnosed prostate cancer, cT1c, GG2 (50% of cores+, 1/1+ targeted), iPSA 15.9.  11/2/2022 MRI prostate noted a 31.6 cc gland, PI-RADS 5 lesion in the left PZ posteriorly at mid gland with extension into the left TZ.  Broad capsular abutment with focal bulging and capsular irregularity, but no gross RUCHI.  No evidence of SV invasion.  No lymphadenopathy.  12/20/2022 transperineal biopsy noted Hurt 3+4 in 50% of the cores, Hurt 3+4 in 1/1 targeted core.  Cintia 4 involvement ranges 5 to 15%.  No evidence of intraductal carcinoma or cribriform pattern.  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  Surgical History:  Levon has a past surgical history that includes Other surgical history (06/28/2022); Other surgical history (06/28/2022); and Other surgical history  (12/07/2022).  Social History:  Levon reports that he has never smoked. He has never been exposed to tobacco smoke. He has never used smokeless tobacco. He reports that he does not currently use alcohol. He reports that he does not use drugs.  Family History:  No family history on file.  Family Oncology History:  Cancer-related family history is not on file.    Review of Systems   Constitutional:  Negative for appetite change, fatigue, fever and unexpected weight change.   Gastrointestinal:         Heartburn   Endocrine: Negative for hot flashes.   Genitourinary:  Positive for frequency and nocturia. Negative for difficulty urinating and hematuria.    Musculoskeletal: Negative.  Negative for gait problem.   Neurological:  Negative for extremity weakness and gait problem.   Hematological: Negative.    Psychiatric/Behavioral:  Positive for decreased concentration and sleep disturbance.      Allergies  Allergies   Allergen Reactions    Other Unknown      Medications  Current Outpatient Medications   Medication Instructions    amLODIPine (NORVASC) 7.5 mg, oral, Daily    aspirin 81 mg capsule 1 capsule, Every 24 hours    atorvastatin (Lipitor) 20 mg tablet TAKE 1 TABLET BY MOUTH ONCE  DAILY AS DIRECTED    lisinopril 20 mg tablet TAKE 1 TABLET BY MOUTH DAILY    omeprazole (PRILOSEC) 40 mg, oral, Daily before breakfast, Do not crush or chew.    psyllium (Metamucil) 0.4 gram capsule 5 capsules, 4 times daily    sertraline (Zoloft) 100 mg tablet TAKE 1 TABLET BY MOUTH DAILY    tamsulosin (FLOMAX) 0.4 mg, oral, Daily    trospium (SANCTURA) 20 mg, oral, 2 times daily        OBJECTIVE:    VS / Pain:  There were no vitals taken for this visit.  BSA: There is no height or weight on file to calculate BSA.  Wt Readings from Last 5 Encounters:   11/14/24 74.8 kg (165 lb)   08/20/24 76.2 kg (168 lb)   05/22/24 76.9 kg (169 lb 9.6 oz)   05/08/24 75.8 kg (167 lb)   04/03/24 74.8 kg (165 lb)       Diagnostic Results   No results found  for this or any previous visit (from the past 96 hours).    Lab Results   Component Value Date    PSA 0.14 01/03/2025    PSA 0.12 11/20/2024    PSA <0.10 09/23/2024     Lab Results   Component Value Date    TESTOSTERONE 786 11/20/2024       Assessment/Plan   85 yo  man (PMH stroke 20y ago) ECOG 2, diagnosed with int risk PC (iPSA 15 / Gl 7) with oligmet bone R iliac bone. 6 months ADT and XRT prostate and bone completed with undetectable PSA / recovered T levels.   Again, with M1-directed therapy and short term ADT, given age / comorbidities we are comfortable holding therapy. Will continue to monitor with PSA/T in few months.    I saw and evaluated the patient. I personally obtained the key and critical portions of the history and physical exam or was physically present for key and critical portions performed by the resident/fellow. I reviewed the resident/fellow's documentation and discussed the patient with the resident/fellow. I agree with the resident/fellow's medical decision making as documented in the note.   Raymon Tellez MD MSc FACP  [facility] Chair in Cancer Research   in Medicine [facility] [employer]  Director Clinical  Medical Oncology Research Program   [facility]  Cell [phone]  Office [phone]

## 2025-01-08 LAB — TESTOSTERONE,TOTAL,LC-MS/MS: 796 NG/DL (ref 250–1100)

## 2025-01-15 ENCOUNTER — LAB (OUTPATIENT)
Dept: LAB | Facility: LAB | Age: 85
End: 2025-01-15
Payer: MEDICARE

## 2025-01-15 ENCOUNTER — APPOINTMENT (OUTPATIENT)
Dept: PRIMARY CARE | Facility: CLINIC | Age: 85
End: 2025-01-15
Payer: MEDICARE

## 2025-01-15 VITALS
WEIGHT: 167 LBS | OXYGEN SATURATION: 97 % | HEIGHT: 72 IN | HEART RATE: 90 BPM | BODY MASS INDEX: 22.62 KG/M2 | DIASTOLIC BLOOD PRESSURE: 78 MMHG | SYSTOLIC BLOOD PRESSURE: 134 MMHG

## 2025-01-15 DIAGNOSIS — N18.32 STAGE 3B CHRONIC KIDNEY DISEASE (MULTI): ICD-10-CM

## 2025-01-15 DIAGNOSIS — R53.83 FATIGUE, UNSPECIFIED TYPE: ICD-10-CM

## 2025-01-15 DIAGNOSIS — R53.1 WEAKNESS GENERALIZED: ICD-10-CM

## 2025-01-15 DIAGNOSIS — E78.2 HYPERLIPIDEMIA, MIXED: ICD-10-CM

## 2025-01-15 DIAGNOSIS — I10 ESSENTIAL HYPERTENSION: Primary | ICD-10-CM

## 2025-01-15 DIAGNOSIS — I69.90 HISTORY OF CEREBROVASCULAR ACCIDENT WITH CURRENT RESIDUAL EFFECTS: ICD-10-CM

## 2025-01-15 PROBLEM — E11.42 TYPE 2 DIABETES MELLITUS WITH DIABETIC POLYNEUROPATHY, WITHOUT LONG-TERM CURRENT USE OF INSULIN: Status: RESOLVED | Noted: 2024-01-09 | Resolved: 2025-01-15

## 2025-01-15 PROBLEM — N18.31 CHRONIC KIDNEY DISEASE, STAGE 3A (MULTI): Status: ACTIVE | Noted: 2025-01-15

## 2025-01-15 PROBLEM — G82.20 PARAPARESIS (MULTI): Status: RESOLVED | Noted: 2023-06-26 | Resolved: 2025-01-15

## 2025-01-15 LAB
25(OH)D3 SERPL-MCNC: 19 NG/ML (ref 30–100)
ALBUMIN SERPL BCP-MCNC: 4.5 G/DL (ref 3.4–5)
ALP SERPL-CCNC: 125 U/L (ref 33–136)
ALT SERPL W P-5'-P-CCNC: 20 U/L (ref 10–52)
ANION GAP SERPL CALC-SCNC: 13 MMOL/L (ref 10–20)
AST SERPL W P-5'-P-CCNC: 16 U/L (ref 9–39)
BASOPHILS # BLD AUTO: 0.05 X10*3/UL (ref 0–0.1)
BASOPHILS NFR BLD AUTO: 0.8 %
BILIRUB SERPL-MCNC: 0.5 MG/DL (ref 0–1.2)
BUN SERPL-MCNC: 25 MG/DL (ref 6–23)
CALCIUM SERPL-MCNC: 9.1 MG/DL (ref 8.6–10.6)
CHLORIDE SERPL-SCNC: 107 MMOL/L (ref 98–107)
CO2 SERPL-SCNC: 29 MMOL/L (ref 21–32)
CREAT SERPL-MCNC: 1.65 MG/DL (ref 0.5–1.3)
EGFRCR SERPLBLD CKD-EPI 2021: 41 ML/MIN/1.73M*2
EOSINOPHIL # BLD AUTO: 0.25 X10*3/UL (ref 0–0.4)
EOSINOPHIL NFR BLD AUTO: 4.1 %
ERYTHROCYTE [DISTWIDTH] IN BLOOD BY AUTOMATED COUNT: 12.2 % (ref 11.5–14.5)
GLUCOSE SERPL-MCNC: 93 MG/DL (ref 74–99)
HCT VFR BLD AUTO: 41.8 % (ref 41–52)
HGB BLD-MCNC: 13.7 G/DL (ref 13.5–17.5)
IMM GRANULOCYTES # BLD AUTO: 0.03 X10*3/UL (ref 0–0.5)
IMM GRANULOCYTES NFR BLD AUTO: 0.5 % (ref 0–0.9)
LYMPHOCYTES # BLD AUTO: 1.22 X10*3/UL (ref 0.8–3)
LYMPHOCYTES NFR BLD AUTO: 19.9 %
MCH RBC QN AUTO: 33.5 PG (ref 26–34)
MCHC RBC AUTO-ENTMCNC: 32.8 G/DL (ref 32–36)
MCV RBC AUTO: 102 FL (ref 80–100)
MONOCYTES # BLD AUTO: 0.35 X10*3/UL (ref 0.05–0.8)
MONOCYTES NFR BLD AUTO: 5.7 %
NEUTROPHILS # BLD AUTO: 4.23 X10*3/UL (ref 1.6–5.5)
NEUTROPHILS NFR BLD AUTO: 69 %
NRBC BLD-RTO: 0 /100 WBCS (ref 0–0)
PLATELET # BLD AUTO: 130 X10*3/UL (ref 150–450)
POTASSIUM SERPL-SCNC: 4.6 MMOL/L (ref 3.5–5.3)
PROT SERPL-MCNC: 6.7 G/DL (ref 6.4–8.2)
RBC # BLD AUTO: 4.09 X10*6/UL (ref 4.5–5.9)
SODIUM SERPL-SCNC: 144 MMOL/L (ref 136–145)
T4 FREE SERPL-MCNC: 0.93 NG/DL (ref 0.78–1.48)
TSH SERPL-ACNC: 4.14 MIU/L (ref 0.44–3.98)
VIT B12 SERPL-MCNC: 343 PG/ML (ref 211–911)
WBC # BLD AUTO: 6.1 X10*3/UL (ref 4.4–11.3)

## 2025-01-15 PROCEDURE — 3078F DIAST BP <80 MM HG: CPT | Performed by: INTERNAL MEDICINE

## 2025-01-15 PROCEDURE — 1159F MED LIST DOCD IN RCRD: CPT | Performed by: INTERNAL MEDICINE

## 2025-01-15 PROCEDURE — 85025 COMPLETE CBC W/AUTO DIFF WBC: CPT

## 2025-01-15 PROCEDURE — 84439 ASSAY OF FREE THYROXINE: CPT

## 2025-01-15 PROCEDURE — 82607 VITAMIN B-12: CPT

## 2025-01-15 PROCEDURE — 1036F TOBACCO NON-USER: CPT | Performed by: INTERNAL MEDICINE

## 2025-01-15 PROCEDURE — 82306 VITAMIN D 25 HYDROXY: CPT

## 2025-01-15 PROCEDURE — 84443 ASSAY THYROID STIM HORMONE: CPT

## 2025-01-15 PROCEDURE — 1160F RVW MEDS BY RX/DR IN RCRD: CPT | Performed by: INTERNAL MEDICINE

## 2025-01-15 PROCEDURE — 99214 OFFICE O/P EST MOD 30 MIN: CPT | Performed by: INTERNAL MEDICINE

## 2025-01-15 PROCEDURE — 80053 COMPREHEN METABOLIC PANEL: CPT

## 2025-01-15 PROCEDURE — 3075F SYST BP GE 130 - 139MM HG: CPT | Performed by: INTERNAL MEDICINE

## 2025-01-15 ASSESSMENT — ENCOUNTER SYMPTOMS
DIZZINESS: 0
WEAKNESS: 1
FATIGUE: 1
MYALGIAS: 0
ABDOMINAL PAIN: 0
SHORTNESS OF BREATH: 0
HYPERTENSION: 1

## 2025-01-15 NOTE — PROGRESS NOTES
Subjective   Patient ID: Levon Mckay is a 84 y.o. male who presents for Follow-up chronic medical problems.    Fatigue, despite sleeping 8-12 hours.  Workup for sleep apnea negative per pt, years ago.  Discussed meds and side effects.  Meds and labs reviewed.    Hypertension  This is a chronic problem. The current episode started more than 1 year ago. The problem has been resolved since onset. The problem is controlled. Pertinent negatives include no chest pain or shortness of breath. The current treatment provides moderate improvement. There are no compliance problems.  Identifiable causes of hypertension include chronic renal disease.   Hyperlipidemia  This is a chronic problem. The current episode started more than 1 year ago. The problem is controlled. Recent lipid tests were reviewed and are low. Exacerbating diseases include chronic renal disease. Pertinent negatives include no chest pain, myalgias or shortness of breath. Current antihyperlipidemic treatment includes statins. The current treatment provides significant improvement of lipids. There are no compliance problems.         Review of Systems   Constitutional:  Positive for fatigue.   Respiratory:  Negative for shortness of breath.    Cardiovascular:  Negative for chest pain.   Gastrointestinal:  Negative for abdominal pain.   Musculoskeletal:  Negative for myalgias.   Neurological:  Positive for weakness. Negative for dizziness.       Objective   /78 (BP Location: Right arm, Patient Position: Sitting)   Pulse 90   Ht 1.829 m (6')   Wt 75.8 kg (167 lb)   SpO2 97%   BMI 22.65 kg/m²     Physical Exam  Constitutional:       Appearance: Normal appearance.   Neck:      Vascular: No carotid bruit.   Cardiovascular:      Rate and Rhythm: Normal rate and regular rhythm.      Pulses: Normal pulses.      Heart sounds: Normal heart sounds.   Pulmonary:      Effort: Pulmonary effort is normal.      Breath sounds: Normal breath sounds.   Neurological:       Mental Status: He is alert.      Motor: Weakness present.      Gait: Gait abnormal.   Psychiatric:         Mood and Affect: Mood normal.         Behavior: Behavior normal.         Thought Content: Thought content normal.         Judgment: Judgment normal.         Assessment/Plan   Problem List Items Addressed This Visit             ICD-10-CM    Essential hypertension - Primary I10     BP at goal with current medications.  Rec low salt diet.  Check BP at home, goal < 140/90.           Relevant Orders    Basic metabolic panel    Hyperlipidemia, mixed E78.2     Lipids, LDL at goal with statin.  Recommend low fat/cholesterol diet.           Relevant Orders    Lipid Panel    ALT    Stage 3b chronic kidney disease (Multi) N18.32     GFR 44, estephanie, followed here.         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  Consider holding tamsulosin.         Relevant Orders    CBC and Auto Differential    Comprehensive metabolic panel    TSH with reflex to Free T4 if abnormal    Vitamin D 25-Hydroxy,Total (for eval of Vitamin D levels)    Vitamin B12    Weakness generalized R53.1     As already noted.         Relevant Orders    CBC and Auto Differential    Comprehensive metabolic panel    TSH with reflex to Free T4 if abnormal    Vitamin D 25-Hydroxy,Total (for eval of Vitamin D levels)    Vitamin B12

## 2025-02-27 ENCOUNTER — TELEPHONE (OUTPATIENT)
Dept: RADIATION ONCOLOGY | Facility: HOSPITAL | Age: 85
End: 2025-02-27
Payer: MEDICARE

## 2025-02-27 ASSESSMENT — ENCOUNTER SYMPTOMS
DYSURIA: 0
WEAKNESS: 0
CONSTIPATION: 0
JOINT SWELLING: 0
FEVER: 0
PALPITATIONS: 0
CHEST TIGHTNESS: 0
ABDOMINAL PAIN: 0
UNEXPECTED WEIGHT CHANGE: 0
ARTHRALGIAS: 0
FATIGUE: 0
DIZZINESS: 0
RECTAL PAIN: 0
HEMATURIA: 0
BACK PAIN: 0
BLOOD IN STOOL: 0
ANAL BLEEDING: 0
DIARRHEA: 0
PSYCHIATRIC NEGATIVE: 1
SHORTNESS OF BREATH: 0
ALLERGIC/IMMUNOLOGIC NEGATIVE: 1
DIFFICULTY URINATING: 0
COUGH: 0
FREQUENCY: 0

## 2025-02-27 NOTE — PROGRESS NOTES
Cancer synopsis:  Rad/onc: Juan Carlos MCMAHONP  Uroloig: Dr. Escobedo/ Taurus VAUGHN  Med/onc: Dr. Tellez/ Lc CNP     84 year-old gentleman with de luisito M1b oligometastatic prostate adenocarcinoma, cT1c, GG2 (50% of  cores+, 1/1+ targeted), iPSA 15.9, cN0 and M1b by PSMA PET, which identified sclerotic R iliac bone lesion (SUV 3.6).   Prostate radiation was recommended, details below. ADT managed by Dr. Tellez.     Treatment Area #1  Location : Prostate_SV  Dates : 3/13/23 - 4/7/23  Number of Treatments : 20  Dose : 5500 cGy  Modality : VMAT    Treatment Area #1  Location : R iliac bone metastasis  Dates : 4/20/23 - 4/27/23  Number of Treatments : 3  Dose : 3000cGy  Modality : SBRT     Clinical Summary :  He tolerated this course of radiation  therapy relatively well, with no unusual events or unanticipated toxicities.  Symptoms during treatment included increased urinary urgency and frequency, managed with flomax, cialis, and myrbetriq. This has worsened his baseline incontinence 2/2 stroke.     History of presenting illness:    Patient ID: [id number]     Levon Mckay is a 84 y.o. male who presents for his de-luisito metastatic prostate cancer M1b per PET/PSMA. Currently completing 6m of ADT (last 02/2023), received SBRT to R iliac and prostate/SV/LN via VMAT 05/2023     RT Site: prostate, SV and r iliac bone  RT Date: 04/2023  Hormone therapy: Yes  Hot Flushes: Denies  Fatigue: Denies  Bone pain:  Denies  EDUARD: virtual  ED: Not sexually active per patient.  ED medications: No  IPSS: virtual  Urinary symptoms: Denies dysuria or hematuria. Reports frequency and nocturia have increased vastly since RT. Reports nocturia now at once a night.  Reports mild urgency. Admits to weak stream. Recently started tropsium with Dr. Escobedo. Patient reports helping with frequency and night time sleeping. Did recent stop flowmax to assess if would improve with fatigue and noticed urination worsened dramatically.  Urinary Medications: Yes,  flowmax daily. Tropsium daily  Rectal bleeding: Denies  Colonoscopy: None on file  Other systems: Denies SOB, CP or fever.    Review of systems:  Review of Systems   Constitutional:  Negative for fatigue, fever and unexpected weight change.   Respiratory:  Negative for cough, chest tightness and shortness of breath.    Cardiovascular:  Negative for chest pain, palpitations and leg swelling.   Gastrointestinal:  Negative for abdominal pain, anal bleeding, blood in stool, constipation, diarrhea and rectal pain.   Endocrine: Negative for cold intolerance, heat intolerance and polyuria.   Genitourinary:  Negative for decreased urine volume, difficulty urinating, dysuria, frequency, hematuria and urgency.   Musculoskeletal:  Negative for arthralgias, back pain, gait problem and joint swelling.   Skin: Negative.    Allergic/Immunologic: Negative.    Neurological:  Negative for dizziness, syncope and weakness.   Psychiatric/Behavioral: Negative.       Past Medical history  No past medical history on file.     Surgical/family history  No family history on file.   Past Surgical History:   Procedure Laterality Date    OTHER SURGICAL HISTORY  06/28/2022    Appendectomy    OTHER SURGICAL HISTORY  06/28/2022    Hip surgery    OTHER SURGICAL HISTORY  12/07/2022    Tonsillectomy with adenoidectomy      Social History  Tobacco Use: Low Risk  (1/15/2025)    Patient History     Smoking Tobacco Use: Never     Smokeless Tobacco Use: Never     Passive Exposure: Never       Current med list:  Current Outpatient Medications   Medication Instructions    amLODIPine (NORVASC) 7.5 mg, oral, Daily    aspirin 81 mg capsule 1 capsule, Every 24 hours    atorvastatin (Lipitor) 20 mg tablet TAKE 1 TABLET BY MOUTH ONCE  DAILY AS DIRECTED    lisinopril 20 mg tablet TAKE 1 TABLET BY MOUTH DAILY    omeprazole (PRILOSEC) 40 mg, oral, Daily before breakfast, Do not crush or chew.    psyllium (Metamucil) 0.4 gram capsule 5 capsules, 4 times daily     sertraline (Zoloft) 100 mg tablet TAKE 1 TABLET BY MOUTH DAILY    tamsulosin (FLOMAX) 0.4 mg, oral, Daily    trospium (SANCTURA) 20 mg, oral, 2 times daily      Last recorded vital:  Virtual    Physical exam  Virtual    Pertinent labs:  Prostate Specific AG   Date/Time Value Ref Range Status   01/03/2025 01:50 PM 0.14 <=4.00 ng/mL Final     Dx:  Problem List Items Addressed This Visit    None  Visit Diagnoses       Malignant neoplasm of prostate (Multi)        Relevant Orders    Clinic Appointment Request Follow Up; [facility] (virtual); [facility] (virtual) (Completed)        PSA of 0.14 was reviewed and is stable, testosterone is now 749 and within normal range. Review of latent SE including rectal bleeding, hematuria, urinary strictures, ED where reviewed as well as how to contact office if s/s present. Denies latent SE.  Discussed FUV PRN w/ rad/onc given low risk for latent SE from RT at this time and further FUV with med/onc for survivorship and disease monitoring.     Recommend vitamin D supplementation, start (or increase by) 400-1000 units daily. Reviewed importance of intake while on Testerone lowering therapy as well as after until Testerone re-establishes, at which point may stop if DEXA indicative of normal bone density. Also reviewed that individuals at a higher risk such as those over the age of 75 or those with a hx of bone fx, osteoporosis or osteopenia to continue supplementation indefinitely with routine DEXA imaging as per national guidelines to assess bone health continually.    Discussed flowmax usage and will restart and let office know if symptoms do not resolve.    PLAN:  FUV PRN  Labs per med/onc  Imaging none  FUV other providers: PCP for routine evals, urology, med/onc for prostate cancer survivorship    Please contact office with any concerns:  [facility] CNP  [phone]    I performed this visit using realtime telehealth tools, including an audio/video OR telephone  connection between patient’s name and location Levon Mckay and Hong Rangel CNP.    2. POS 10: Telehealth provided in patient's home.  o Patient is located in their home (which is a location other than a hospital or other facility where the patient receives care in a private residence) when receiving health services or health related services through telecommunication technology.

## 2025-02-27 NOTE — TELEPHONE ENCOUNTER
Called pt. to remind of appointment on 2/28/2025 at  1:00 pm with JOHANA Rangel.  Pt answered and confirmed appointment

## 2025-02-28 ENCOUNTER — HOSPITAL ENCOUNTER (OUTPATIENT)
Dept: RADIATION ONCOLOGY | Facility: HOSPITAL | Age: 85
Setting detail: RADIATION/ONCOLOGY SERIES
Discharge: HOME | End: 2025-02-28
Payer: MEDICARE

## 2025-02-28 DIAGNOSIS — C61 MALIGNANT NEOPLASM OF PROSTATE (MULTI): ICD-10-CM

## 2025-02-28 PROCEDURE — 99214 OFFICE O/P EST MOD 30 MIN: CPT

## 2025-02-28 PROCEDURE — 99214 OFFICE O/P EST MOD 30 MIN: CPT | Mod: 95

## 2025-03-24 ENCOUNTER — TELEPHONE (OUTPATIENT)
Dept: PRIMARY CARE | Facility: CLINIC | Age: 85
End: 2025-03-24
Payer: MEDICARE

## 2025-03-24 NOTE — TELEPHONE ENCOUNTER
Caregiver Tyson Omar has been called for jury duty requesting letter since he takes care of father in law Levon, please advise

## 2025-03-26 ENCOUNTER — APPOINTMENT (OUTPATIENT)
Dept: OTOLARYNGOLOGY | Facility: CLINIC | Age: 85
End: 2025-03-26
Payer: MEDICARE

## 2025-03-26 ENCOUNTER — APPOINTMENT (OUTPATIENT)
Dept: AUDIOLOGY | Facility: CLINIC | Age: 85
End: 2025-03-26
Payer: MEDICARE

## 2025-03-26 VITALS — HEIGHT: 72 IN | WEIGHT: 167 LBS | BODY MASS INDEX: 22.62 KG/M2

## 2025-03-26 DIAGNOSIS — H90.3 SENSORINEURAL HEARING LOSS (SNHL) OF BOTH EARS: Primary | ICD-10-CM

## 2025-03-26 DIAGNOSIS — H90.3 SENSORINEURAL HEARING LOSS (SNHL) OF BOTH EARS: ICD-10-CM

## 2025-03-26 DIAGNOSIS — H61.21 IMPACTED CERUMEN OF RIGHT EAR: Primary | ICD-10-CM

## 2025-03-26 DIAGNOSIS — H90.3 ASYMMETRIC SNHL (SENSORINEURAL HEARING LOSS): ICD-10-CM

## 2025-03-26 PROCEDURE — 92557 COMPREHENSIVE HEARING TEST: CPT | Performed by: AUDIOLOGIST

## 2025-03-26 PROCEDURE — 69210 REMOVE IMPACTED EAR WAX UNI: CPT | Performed by: STUDENT IN AN ORGANIZED HEALTH CARE EDUCATION/TRAINING PROGRAM

## 2025-03-26 PROCEDURE — 1159F MED LIST DOCD IN RCRD: CPT | Performed by: STUDENT IN AN ORGANIZED HEALTH CARE EDUCATION/TRAINING PROGRAM

## 2025-03-26 PROCEDURE — 99214 OFFICE O/P EST MOD 30 MIN: CPT | Performed by: STUDENT IN AN ORGANIZED HEALTH CARE EDUCATION/TRAINING PROGRAM

## 2025-03-26 PROCEDURE — 1036F TOBACCO NON-USER: CPT | Performed by: STUDENT IN AN ORGANIZED HEALTH CARE EDUCATION/TRAINING PROGRAM

## 2025-03-26 PROCEDURE — 92567 TYMPANOMETRY: CPT | Performed by: AUDIOLOGIST

## 2025-03-26 NOTE — PROGRESS NOTES
Assessment  Asymmetric sensorineural hearing loss  Cerumen impaction     Plan  ABR 3/14/24 showing no evidence of retrocochlear pathology.  Right cerumen impaction removed. Preventive measures discussed.    Audiogram performed today notable for stable asymmetric sensorineural hearing loss.  At this time the patient is not interested in amplification.  We will monitor his hearing with yearly audiograms  RTC 1y or sooner as needed.           History of Present Illness  3/26/25  The patient present for follow-up, endorses right cerumen buildup, no other otologic complaint.  4/3/24  ABR reviewed and discussed with the patient, no evidence of retrocochlear pathology.  Recall   82-year-old male presenting for initial evaluation of hearing loss. The patient reports developing right cerumen buildup, attempted removing the wax with Q-tip [id number] to cerumen impaction.  In addition the patient endorses bilateral progressive hearing loss for years.   Denies ear pain, vertigo, itching, discharge from ear, tinnitus, aural fullness or autophony.   Denies history of prior ear surgery.       No past medical history on file.    Past Surgical History:   Procedure Laterality Date    OTHER SURGICAL HISTORY  06/28/2022    Appendectomy    OTHER SURGICAL HISTORY  06/28/2022    Hip surgery    OTHER SURGICAL HISTORY  12/07/2022    Tonsillectomy with adenoidectomy         Current Outpatient Medications on File Prior to Visit   Medication Sig Dispense Refill    amLODIPine (Norvasc) 5 mg tablet Take 1.5 tablets (7.5 mg) by mouth once daily. 135 tablet 3    aspirin 81 mg capsule 1 capsule once every 24 hours.      atorvastatin (Lipitor) 20 mg tablet TAKE 1 TABLET BY MOUTH ONCE  DAILY AS DIRECTED 90 tablet 3    lisinopril 20 mg tablet TAKE 1 TABLET BY MOUTH DAILY 90 tablet 3    omeprazole (PriLOSEC) 40 mg DR capsule Take 1 capsule (40 mg) by mouth once daily in the morning. Take before meals. Do not crush or chew. 30 capsule 3    psyllium  (Metamucil) 0.4 gram capsule Take 5 capsules by mouth 4 times a day.      sertraline (Zoloft) 100 mg tablet TAKE 1 TABLET BY MOUTH DAILY 90 tablet 1    tamsulosin (Flomax) 0.4 mg 24 hr capsule Take 1 capsule (0.4 mg) by mouth once daily. 90 capsule 3    trospium (Sanctura) 20 mg tablet Take 1 tablet (20 mg) by mouth 2 times a day. 180 tablet 3     No current facility-administered medications on file prior to visit.        Allergies   Allergen Reactions    Other Unknown        Review of Systems  A detailed 12 point ROS was performed and is negative except as noted in the intake form, HPI and/or Past Medical History      Physical Exam  CONSTITUTIONAL: Well developed, NAD  VOICE: Normal voice quality  RESPIRATION: Breathing comfortably, no stridor.  CV: No clubbing/cyanosis/edema in hands.  EYES: EOM Intact, sclera normal.  NEURO: Alert and oriented times 3, Cranial nerves V,VII intact and symmetric bilaterally.  HEAD AND FACE: Symmetric facial features, no masses or lesions, sinuses nontender to palpation.  SALIVARY GLANDS: Parotid and submandibular glands normal bilaterally.  + EARS: Normal external ears  Right EAC with cerumen impaction (removed)  Right EAC patent, tympanic membrane intact.  Left EAC patent, tympanic membrane intact.   NOSE: External nose midline, anterior rhinoscopy is normal with limited visualization to the anterior aspect of the interior turbinates. No lesions noted.  ORAL CAVITY/OROPHARYNX/LIPS: Normal mucous membranes, normal floor of mouth/tongue/OP, no masses or lesions are noted.  PHARYNGEAL WALLS AND NASOPHARYNX: No masses noted. Mucosa appears clean and moist  NECK/LYMPH: No LAD, no thyroid masses. Trachea palpably midline  SKIN: Neck skin is without injury  PSYCH: Alert and oriented with appropriate mood and affect     Procedure: Removal of impacted cerumen, right   Indication: Cerumen impaction.   The procedure was reviewed and explained.  Verbal consent was obtained.  With the use of  the otoscope the right ear was examined, cerumen was cleaned with the use of curettes. The patient tolerated the procedure well.  Hearing returned to baseline following cerumen removal.    Results:   Audiogram 3/26/2025 personally reviewed  Right: Normal hearing up to 250 Hz, mild sensorineural hearing loss at 500 Hz, normal hearing from 1000 to 2000 Hz sloping to moderate sensorineural hearing loss.  Speech discrimination score 100%.  AD tympanogram.  Left: Normal hearing up to 500 Hz, mild sensorineural hearing loss from 1000 to 2000 Hz sloping to moderate sensorineural hearing loss.  Speech discrimination score 96%.  AD tympanogram.

## 2025-03-26 NOTE — PROGRESS NOTES
[facility]UDIOLOGY  AUDIOMETRIC EVALUATION      Name:  Levon Mckay   :  1940  Age:  84 y.o.  Date of Evaluation:  25    HISTORY    Levon Mckay is seen today at the request of Ferny Guadalupe M.D.  The patient is an established patient monitoring hearing loss progression.    EVALUATION  See scanned audiogram in Media and included at the end of this report.    RESULTS    Otoscopic Evaluation:  Right Ear:  clear   Left Ear:  clear    Tympanometry:   Right Ear:  Type Ad, consistent with excessive eardrum mobility   Left Ear:  Type Ad, consistent with excessive eardrum mobility    Acoustic reflexes were not completed    Pure Tone Audiometry:    Right Ear:  normal to moderate sensorineural hearing loss  Left Ear:  normal to moderate sensorineural hearing loss       Speech Audiometry:    Right Ear:  excellent in quiet at an elevated presentation level  Left Ear:  excellent in quiet at an elevated presentation level  Speech reception thresholds were in good agreement with pure tone testing.    DISCUSSION  Results were relayed to Ferny Guadalupe M.D.    APPOINTMENT TIME  11:00am-11:30am     Rafaela Joshi  Doctor of Audiology  Senior Audiologist

## 2025-03-31 DIAGNOSIS — I10 ESSENTIAL HYPERTENSION: ICD-10-CM

## 2025-04-04 ENCOUNTER — APPOINTMENT (OUTPATIENT)
Dept: AUDIOLOGY | Facility: CLINIC | Age: 85
End: 2025-04-04
Payer: MEDICARE

## 2025-04-04 ENCOUNTER — TELEPHONE (OUTPATIENT)
Dept: PRIMARY CARE | Facility: CLINIC | Age: 85
End: 2025-04-04

## 2025-04-04 ENCOUNTER — APPOINTMENT (OUTPATIENT)
Dept: OTOLARYNGOLOGY | Facility: CLINIC | Age: 85
End: 2025-04-04
Payer: MEDICARE

## 2025-04-07 RX ORDER — AMLODIPINE BESYLATE 5 MG/1
7.5 TABLET ORAL DAILY
Qty: 135 TABLET | Refills: 3 | Status: SHIPPED | OUTPATIENT
Start: 2025-04-07

## 2025-04-29 ENCOUNTER — APPOINTMENT (OUTPATIENT)
Dept: OTOLARYNGOLOGY | Facility: CLINIC | Age: 85
End: 2025-04-29
Payer: MEDICARE

## 2025-04-29 ENCOUNTER — APPOINTMENT (OUTPATIENT)
Dept: AUDIOLOGY | Facility: CLINIC | Age: 85
End: 2025-04-29
Payer: MEDICARE

## 2025-05-06 ENCOUNTER — APPOINTMENT (OUTPATIENT)
Dept: UROLOGY | Facility: CLINIC | Age: 85
End: 2025-05-06
Payer: MEDICARE

## 2025-05-06 NOTE — PROGRESS NOTES
[facility]  Outpatient Clinic Note    Subjective   Levon Mckay is a 85 y.o. male    History of Present Illness   Patient presenting to clinic today for 6 month FUV. History of BPH wth Nocturia, Prostate Cancer s/p EBRT and Leuprolide injection 2023, CKD3, Urinary frequency, and urgency. Symptoms have improved significantly since starting Trospium. NTF was up to 10x, now NTF 1 to 3x. Daytime frequency not bothersome. Also taking Tamsulosin 0.4 mg daily He drinks mostly tea and coffee in the morning and afternoon, limits fluids in the evening   Endorses dry mouth, no constipation   Urinalysis today Negative   PVR 5    Lab Results   Component Value Date    PSA 0.14 01/03/2025    PSA 0.12 11/20/2024    PSA <0.10 09/23/2024    PSA 0.11 06/24/2024    PSA <0.10 05/16/2024    PSA <0.10 03/25/2024    PSA <0.10 12/21/2023    PSA <0.10 09/19/2023    PSA <0.10 06/23/2023    PSA 8.91 (H) 03/17/2023     Past Medical History and Surgical History   Past Medical History:   Diagnosis Date    Prostate cancer (Multi)     Stroke (Multi)      Past Surgical History:   Procedure Laterality Date    OTHER SURGICAL HISTORY  06/28/2022    Appendectomy    OTHER SURGICAL HISTORY  06/28/2022    Hip surgery    OTHER SURGICAL HISTORY  12/07/2022    Tonsillectomy with adenoidectomy       Medications  Current Outpatient Medications on File Prior to Visit   Medication Sig Dispense Refill    amLODIPine (Norvasc) 5 mg tablet Take 1.5 tablets (7.5 mg) by mouth once daily. 135 tablet 3    aspirin 81 mg capsule 1 capsule once every 24 hours.      atorvastatin (Lipitor) 20 mg tablet TAKE 1 TABLET BY MOUTH ONCE  DAILY AS DIRECTED 90 tablet 3    lisinopril 20 mg tablet TAKE 1 TABLET BY MOUTH DAILY 90 tablet 3    omeprazole (PriLOSEC) 40 mg DR capsule Take 1 capsule (40 mg) by mouth once daily in the morning. Take before meals. Do not crush or chew. 30 capsule 3    psyllium (Metamucil) 0.4 gram capsule Take 5 capsules by mouth 4 times a day.       sertraline (Zoloft) 100 mg tablet TAKE 1 TABLET BY MOUTH DAILY 90 tablet 1    tamsulosin (Flomax) 0.4 mg 24 hr capsule Take 1 capsule (0.4 mg) by mouth once daily. 90 capsule 3    trospium (Sanctura) 20 mg tablet Take 1 tablet (20 mg) by mouth 2 times a day. 180 tablet 3     No current facility-administered medications on file prior to visit.       Objective   Physicial Exam  General: Well developed, well nourished, alert and cooperative, appears in no acute distress  Eyes: Non-injected conjunctiva, sclera clear, no proptosis  Cardiac: Extremities are warm and well perfused. No edema, cyanosis or pallor.   Lungs: Breathing is easy, non-labored. Speaking in clear and complete sentences. Normal diaphragmatic movement.  MSK: Ambulatory with steady gait, unassisted  Neuro: alert and oriented to person, place and time  Psych: Demonstrates good judgement and reason, without hallucinations, abnormal affect or abnormal behaviors.  Skin: no obvious lesions, no rashes.    No visits with results within 30 Day(s) from this visit.   Latest known visit with results is:   Lab on 01/15/2025   Component Date Value Ref Range Status    WBC 01/15/2025 6.1  4.4 - 11.3 x10*3/uL Final    nRBC 01/15/2025 0.0  0.0 - 0.0 /100 WBCs Final    RBC 01/15/2025 4.09 (L)  4.50 - 5.90 x10*6/uL Final    Hemoglobin 01/15/2025 13.7  13.5 - 17.5 g/dL Final    Hematocrit 01/15/2025 41.8  41.0 - 52.0 % Final    MCV 01/15/2025 102 (H)  80 - 100 fL Final    MCH 01/15/2025 33.5  26.0 - 34.0 pg Final    MCHC 01/15/2025 32.8  32.0 - 36.0 g/dL Final    RDW 01/15/2025 12.2  11.5 - 14.5 % Final    Platelets 01/15/2025 130 (L)  150 - 450 x10*3/uL Final    Neutrophils % 01/15/2025 69.0  40.0 - 80.0 % Final    Immature Granulocytes %, Automated 01/15/2025 0.5  0.0 - 0.9 % Final    Immature Granulocyte Count (IG) includes promyelocytes, myelocytes and metamyelocytes but does not include bands. Percent differential counts (%) should be interpreted in the context of the  absolute cell counts (cells/UL).    Lymphocytes % 01/15/2025 19.9  13.0 - 44.0 % Final    Monocytes % 01/15/2025 5.7  2.0 - 10.0 % Final    Eosinophils % 01/15/2025 4.1  0.0 - 6.0 % Final    Basophils % 01/15/2025 0.8  0.0 - 2.0 % Final    Neutrophils Absolute 01/15/2025 4.23  1.60 - 5.50 x10*3/uL Final    Percent differential counts (%) should be interpreted in the context of the absolute cell counts (cells/uL).    Immature Granulocytes Absolute, Au* 01/15/2025 0.03  0.00 - 0.50 x10*3/uL Final    Lymphocytes Absolute 01/15/2025 1.22  0.80 - 3.00 x10*3/uL Final    Monocytes Absolute 01/15/2025 0.35  0.05 - 0.80 x10*3/uL Final    Eosinophils Absolute 01/15/2025 0.25  0.00 - 0.40 x10*3/uL Final    Basophils Absolute 01/15/2025 0.05  0.00 - 0.10 x10*3/uL Final    Glucose 01/15/2025 93  74 - 99 mg/dL Final    Sodium 01/15/2025 144  136 - 145 mmol/L Final    Potassium 01/15/2025 4.6  3.5 - 5.3 mmol/L Final    Chloride 01/15/2025 107  98 - 107 mmol/L Final    Bicarbonate 01/15/2025 29  21 - 32 mmol/L Final    Anion Gap 01/15/2025 13  10 - 20 mmol/L Final    Urea Nitrogen 01/15/2025 25 (H)  6 - 23 mg/dL Final    Creatinine 01/15/2025 1.65 (H)  0.50 - 1.30 mg/dL Final    eGFR 01/15/2025 41 (L)  >60 mL/min/1.73m*2 Final    Calculations of estimated GFR are performed using the 2021 CKD-EPI Study Refit equation without the race variable for the IDMS-Traceable creatinine methods.  https://jasn.asnjournals.org/content/early/2021/09/22/ASN.2839232882    Calcium 01/15/2025 9.1  8.6 - 10.6 mg/dL Final    Albumin 01/15/2025 4.5  3.4 - 5.0 g/dL Final    Alkaline Phosphatase 01/15/2025 125  33 - 136 U/L Final    Total Protein 01/15/2025 6.7  6.4 - 8.2 g/dL Final    AST 01/15/2025 16  9 - 39 U/L Final    Bilirubin, Total 01/15/2025 0.5  0.0 - 1.2 mg/dL Final    ALT 01/15/2025 20  10 - 52 U/L Final    Patients treated with Sulfasalazine may generate falsely decreased results for ALT.    Thyroid Stimulating Hormone 01/15/2025 4.14 (H)   0.44 - 3.98 mIU/L Final    Vitamin D, 25-Hydroxy, Total 01/15/2025 19 (L)  30 - 100 ng/mL Final    Vitamin B12 01/15/2025 343  211 - 911 pg/mL Final    Thyroxine, Free 01/15/2025 0.93  0.78 - 1.48 ng/dL Final      Review of Systems  All other systems have been reviewed and are negative for complaint.      Assessment and Plan     BPH with LUTS   Continue Trospium 20 mg once daily     I educated the patient on relevant lower urinary tract anatomy and physiology with emphasis on differential diagnosis as well as contributing factors to the patient's lower urinary tract symptoms. I educated the patient on dietary and behavioral modifications pertinent to the patient's complaints. I recommended to avoid caffeine, alcohol, spicy and acidic oral intake and to regulate fluid intake and voiding (timed voiding, double voiding). I stressed the importance of avoiding constipation and recommended stool softeners unless diarrhea present. We discussed limiting fluid intake at night and elevating legs prior to bedtime.     Continue Tamsulosin 0.4 mg   RTC 6 months, sooner if needed     All questions and concerns were addressed. Patient verbalizes understanding and has no other questions at this time.     Emerita Condon-- JOHANA MOLINA  Office Phone:  [phone]

## 2025-05-07 ENCOUNTER — APPOINTMENT (OUTPATIENT)
Dept: UROLOGY | Facility: CLINIC | Age: 85
End: 2025-05-07
Payer: MEDICARE

## 2025-05-07 VITALS — TEMPERATURE: 97.3 F

## 2025-05-07 DIAGNOSIS — R35.0 URINARY FREQUENCY: ICD-10-CM

## 2025-05-07 DIAGNOSIS — R35.1 BENIGN PROSTATIC HYPERPLASIA WITH NOCTURIA: ICD-10-CM

## 2025-05-07 DIAGNOSIS — N39.41 URGE INCONTINENCE OF URINE: Primary | ICD-10-CM

## 2025-05-07 DIAGNOSIS — N40.1 BENIGN PROSTATIC HYPERPLASIA WITH NOCTURIA: ICD-10-CM

## 2025-05-07 DIAGNOSIS — C61 PROSTATE CANCER (MULTI): ICD-10-CM

## 2025-05-07 LAB
POC APPEARANCE, URINE: CLEAR
POC BILIRUBIN, URINE: NEGATIVE
POC BLOOD, URINE: NEGATIVE
POC COLOR, URINE: YELLOW
POC GLUCOSE, URINE: NEGATIVE MG/DL
POC KETONES, URINE: NEGATIVE MG/DL
POC LEUKOCYTES, URINE: NEGATIVE
POC NITRITE,URINE: NEGATIVE
POC PH, URINE: 5.5 PH
POC PROTEIN, URINE: ABNORMAL MG/DL
POC SPECIFIC GRAVITY, URINE: 1.02
POC UROBILINOGEN, URINE: 0.2 EU/DL

## 2025-05-07 PROCEDURE — 1036F TOBACCO NON-USER: CPT | Performed by: NURSE PRACTITIONER

## 2025-05-07 PROCEDURE — 1160F RVW MEDS BY RX/DR IN RCRD: CPT | Performed by: NURSE PRACTITIONER

## 2025-05-07 PROCEDURE — 1126F AMNT PAIN NOTED NONE PRSNT: CPT | Performed by: NURSE PRACTITIONER

## 2025-05-07 PROCEDURE — 99213 OFFICE O/P EST LOW 20 MIN: CPT | Performed by: NURSE PRACTITIONER

## 2025-05-07 PROCEDURE — G2211 COMPLEX E/M VISIT ADD ON: HCPCS | Performed by: NURSE PRACTITIONER

## 2025-05-07 PROCEDURE — 1159F MED LIST DOCD IN RCRD: CPT | Performed by: NURSE PRACTITIONER

## 2025-05-07 PROCEDURE — 81003 URINALYSIS AUTO W/O SCOPE: CPT | Performed by: NURSE PRACTITIONER

## 2025-05-07 PROCEDURE — 51798 US URINE CAPACITY MEASURE: CPT | Performed by: NURSE PRACTITIONER

## 2025-05-07 RX ORDER — TROSPIUM CHLORIDE 20 MG/1
20 TABLET, FILM COATED ORAL DAILY
Start: 2025-05-07 | End: 2027-04-27

## 2025-05-07 ASSESSMENT — PAIN SCALES - GENERAL: PAINLEVEL_OUTOF10: 0-NO PAIN

## 2025-05-08 ENCOUNTER — APPOINTMENT (OUTPATIENT)
Dept: UROLOGY | Facility: CLINIC | Age: 85
End: 2025-05-08
Payer: MEDICARE

## 2025-05-13 ENCOUNTER — APPOINTMENT (OUTPATIENT)
Dept: UROLOGY | Facility: CLINIC | Age: 85
End: 2025-05-13
Payer: MEDICARE

## 2025-05-20 ENCOUNTER — APPOINTMENT (OUTPATIENT)
Dept: PRIMARY CARE | Facility: CLINIC | Age: 85
End: 2025-05-20
Payer: MEDICARE

## 2025-05-24 DIAGNOSIS — F41.8 OTHER SPECIFIED ANXIETY DISORDERS: ICD-10-CM

## 2025-05-27 RX ORDER — SERTRALINE HYDROCHLORIDE 100 MG/1
100 TABLET, FILM COATED ORAL DAILY
Qty: 90 TABLET | Refills: 1 | Status: ON HOLD | OUTPATIENT
Start: 2025-05-27

## 2025-05-29 DIAGNOSIS — C61 PROSTATE CANCER (MULTI): ICD-10-CM

## 2025-05-30 PROCEDURE — 84153 ASSAY OF PSA TOTAL: CPT

## 2025-05-30 PROCEDURE — 36415 COLL VENOUS BLD VENIPUNCTURE: CPT

## 2025-05-31 ENCOUNTER — LAB (OUTPATIENT)
Dept: LAB | Facility: HOSPITAL | Age: 85
End: 2025-05-31
Payer: MEDICARE

## 2025-05-31 ENCOUNTER — HOSPITAL ENCOUNTER (INPATIENT)
Facility: HOSPITAL | Age: 85
End: 2025-05-31
Attending: EMERGENCY MEDICINE | Admitting: INTERNAL MEDICINE
Payer: MEDICARE

## 2025-05-31 DIAGNOSIS — C61 MALIGNANT NEOPLASM OF PROSTATE (MULTI): Primary | ICD-10-CM

## 2025-05-31 DIAGNOSIS — Z96.649 PERIPROSTHETIC FRACTURE OF HIP, SUBSEQUENT ENCOUNTER: Primary | ICD-10-CM

## 2025-05-31 DIAGNOSIS — M97.8XXD PERIPROSTHETIC FRACTURE OF HIP, SUBSEQUENT ENCOUNTER: Primary | ICD-10-CM

## 2025-05-31 LAB
ALT SERPL-CCNC: 26 U/L (ref 9–46)
ANION GAP SERPL CALCULATED.4IONS-SCNC: 9 MMOL/L (CALC) (ref 7–17)
BUN SERPL-MCNC: 24 MG/DL (ref 7–25)
BUN/CREAT SERPL: 15 (CALC) (ref 6–22)
CALCIUM SERPL-MCNC: 8.6 MG/DL (ref 8.6–10.3)
CHLORIDE SERPL-SCNC: 108 MMOL/L (ref 98–110)
CHOLEST SERPL-MCNC: 127 MG/DL
CHOLEST/HDLC SERPL: 3.7 (CALC)
CO2 SERPL-SCNC: 26 MMOL/L (ref 20–32)
CREAT SERPL-MCNC: 1.65 MG/DL (ref 0.7–1.22)
EGFRCR SERPLBLD CKD-EPI 2021: 41 ML/MIN/1.73M2
GLUCOSE SERPL-MCNC: 85 MG/DL (ref 65–99)
HDLC SERPL-MCNC: 34 MG/DL
LDLC SERPL CALC-MCNC: 71 MG/DL (CALC)
NONHDLC SERPL-MCNC: 93 MG/DL (CALC)
POTASSIUM SERPL-SCNC: 4.7 MMOL/L (ref 3.5–5.3)
PSA SERPL-MCNC: 0.16 NG/ML
SODIUM SERPL-SCNC: 143 MMOL/L (ref 135–146)
TRIGL SERPL-MCNC: 132 MG/DL

## 2025-05-31 PROCEDURE — 99285 EMERGENCY DEPT VISIT HI MDM: CPT | Performed by: EMERGENCY MEDICINE

## 2025-05-31 ASSESSMENT — PAIN SCALES - GENERAL: PAINLEVEL_OUTOF10: 8

## 2025-05-31 ASSESSMENT — PAIN DESCRIPTION - LOCATION: LOCATION: HIP

## 2025-05-31 ASSESSMENT — PAIN DESCRIPTION - ORIENTATION: ORIENTATION: LEFT

## 2025-05-31 ASSESSMENT — COLUMBIA-SUICIDE SEVERITY RATING SCALE - C-SSRS
1. IN THE PAST MONTH, HAVE YOU WISHED YOU WERE DEAD OR WISHED YOU COULD GO TO SLEEP AND NOT WAKE UP?: NO
2. HAVE YOU ACTUALLY HAD ANY THOUGHTS OF KILLING YOURSELF?: NO
6. HAVE YOU EVER DONE ANYTHING, STARTED TO DO ANYTHING, OR PREPARED TO DO ANYTHING TO END YOUR LIFE?: NO

## 2025-05-31 ASSESSMENT — PAIN - FUNCTIONAL ASSESSMENT: PAIN_FUNCTIONAL_ASSESSMENT: 0-10

## 2025-06-01 ENCOUNTER — APPOINTMENT (OUTPATIENT)
Dept: RADIOLOGY | Facility: HOSPITAL | Age: 85
End: 2025-06-01
Payer: MEDICARE

## 2025-06-01 ENCOUNTER — APPOINTMENT (OUTPATIENT)
Dept: CARDIOLOGY | Facility: HOSPITAL | Age: 85
End: 2025-06-01
Payer: MEDICARE

## 2025-06-01 PROBLEM — M97.8XXD PERIPROSTHETIC FRACTURE OF HIP, SUBSEQUENT ENCOUNTER: Status: ACTIVE | Noted: 2025-06-01

## 2025-06-01 PROBLEM — Z96.649 PERIPROSTHETIC FRACTURE OF HIP, SUBSEQUENT ENCOUNTER: Status: ACTIVE | Noted: 2025-06-01

## 2025-06-01 LAB
ABO GROUP (TYPE) IN BLOOD: NORMAL
ALBUMIN SERPL BCP-MCNC: 4.2 G/DL (ref 3.4–5)
ALP SERPL-CCNC: 110 U/L (ref 33–136)
ALT SERPL W P-5'-P-CCNC: 31 U/L (ref 10–52)
ANION GAP SERPL CALC-SCNC: 14 MMOL/L (ref 10–20)
ANTIBODY SCREEN: NORMAL
APPEARANCE UR: CLEAR
AST SERPL W P-5'-P-CCNC: 26 U/L (ref 9–39)
BASOPHILS # BLD AUTO: 0.02 X10*3/UL (ref 0–0.1)
BASOPHILS NFR BLD AUTO: 0.2 %
BILIRUB SERPL-MCNC: 0.5 MG/DL (ref 0–1.2)
BILIRUB UR STRIP.AUTO-MCNC: NEGATIVE MG/DL
BUN SERPL-MCNC: 28 MG/DL (ref 6–23)
CALCIUM SERPL-MCNC: 8.9 MG/DL (ref 8.6–10.3)
CARDIAC TROPONIN I PNL SERPL HS: 9 NG/L (ref 0–20)
CHLORIDE SERPL-SCNC: 107 MMOL/L (ref 98–107)
CO2 SERPL-SCNC: 23 MMOL/L (ref 21–32)
COLOR UR: YELLOW
CREAT SERPL-MCNC: 1.97 MG/DL (ref 0.5–1.3)
EGFRCR SERPLBLD CKD-EPI 2021: 33 ML/MIN/1.73M*2
EOSINOPHIL # BLD AUTO: 0.04 X10*3/UL (ref 0–0.4)
EOSINOPHIL NFR BLD AUTO: 0.3 %
ERYTHROCYTE [DISTWIDTH] IN BLOOD BY AUTOMATED COUNT: 12.4 % (ref 11.5–14.5)
ETHANOL SERPL-MCNC: <10 MG/DL
GLUCOSE SERPL-MCNC: 116 MG/DL (ref 74–99)
GLUCOSE UR STRIP.AUTO-MCNC: NORMAL MG/DL
HCT VFR BLD AUTO: 38.9 % (ref 41–52)
HGB BLD-MCNC: 13.2 G/DL (ref 13.5–17.5)
IMM GRANULOCYTES # BLD AUTO: 0.04 X10*3/UL (ref 0–0.5)
IMM GRANULOCYTES NFR BLD AUTO: 0.3 % (ref 0–0.9)
INR PPP: 1.2 (ref 0.9–1.1)
KETONES UR STRIP.AUTO-MCNC: NEGATIVE MG/DL
LACTATE SERPL-SCNC: 1.4 MMOL/L (ref 0.4–2)
LACTATE SERPL-SCNC: 2.3 MMOL/L (ref 0.4–2)
LEUKOCYTE ESTERASE UR QL STRIP.AUTO: NEGATIVE
LYMPHOCYTES # BLD AUTO: 0.6 X10*3/UL (ref 0.8–3)
LYMPHOCYTES NFR BLD AUTO: 4.9 %
MCH RBC QN AUTO: 33 PG (ref 26–34)
MCHC RBC AUTO-ENTMCNC: 33.9 G/DL (ref 32–36)
MCV RBC AUTO: 97 FL (ref 80–100)
MONOCYTES # BLD AUTO: 0.76 X10*3/UL (ref 0.05–0.8)
MONOCYTES NFR BLD AUTO: 6.2 %
MUCOUS THREADS #/AREA URNS AUTO: NORMAL /LPF
NEUTROPHILS # BLD AUTO: 10.74 X10*3/UL (ref 1.6–5.5)
NEUTROPHILS NFR BLD AUTO: 88.1 %
NITRITE UR QL STRIP.AUTO: NEGATIVE
NRBC BLD-RTO: 0 /100 WBCS (ref 0–0)
PH UR STRIP.AUTO: 6 [PH]
PLATELET # BLD AUTO: 130 X10*3/UL (ref 150–450)
POTASSIUM SERPL-SCNC: 4.3 MMOL/L (ref 3.5–5.3)
PROT SERPL-MCNC: 6.3 G/DL (ref 6.4–8.2)
PROT UR STRIP.AUTO-MCNC: ABNORMAL MG/DL
PROTHROMBIN TIME: 13 SECONDS (ref 9.8–12.4)
RBC # BLD AUTO: 4 X10*6/UL (ref 4.5–5.9)
RBC # UR STRIP.AUTO: ABNORMAL MG/DL
RBC #/AREA URNS AUTO: NORMAL /HPF
RH FACTOR (ANTIGEN D): NORMAL
SODIUM SERPL-SCNC: 140 MMOL/L (ref 136–145)
SP GR UR STRIP.AUTO: 1.03
UROBILINOGEN UR STRIP.AUTO-MCNC: NORMAL MG/DL
WBC # BLD AUTO: 12.2 X10*3/UL (ref 4.4–11.3)
WBC #/AREA URNS AUTO: NORMAL /HPF

## 2025-06-01 PROCEDURE — 71045 X-RAY EXAM CHEST 1 VIEW: CPT | Performed by: RADIOLOGY

## 2025-06-01 PROCEDURE — 1200000002 HC GENERAL ROOM WITH TELEMETRY DAILY

## 2025-06-01 PROCEDURE — 70450 CT HEAD/BRAIN W/O DYE: CPT

## 2025-06-01 PROCEDURE — 81001 URINALYSIS AUTO W/SCOPE: CPT | Performed by: EMERGENCY MEDICINE

## 2025-06-01 PROCEDURE — 70450 CT HEAD/BRAIN W/O DYE: CPT | Performed by: RADIOLOGY

## 2025-06-01 PROCEDURE — 74177 CT ABD & PELVIS W/CONTRAST: CPT

## 2025-06-01 PROCEDURE — 2500000004 HC RX 250 GENERAL PHARMACY W/ HCPCS (ALT 636 FOR OP/ED): Performed by: INTERNAL MEDICINE

## 2025-06-01 PROCEDURE — 36415 COLL VENOUS BLD VENIPUNCTURE: CPT | Performed by: EMERGENCY MEDICINE

## 2025-06-01 PROCEDURE — 2500000004 HC RX 250 GENERAL PHARMACY W/ HCPCS (ALT 636 FOR OP/ED): Performed by: EMERGENCY MEDICINE

## 2025-06-01 PROCEDURE — 84484 ASSAY OF TROPONIN QUANT: CPT | Performed by: EMERGENCY MEDICINE

## 2025-06-01 PROCEDURE — 72128 CT CHEST SPINE W/O DYE: CPT | Performed by: RADIOLOGY

## 2025-06-01 PROCEDURE — 2550000001 HC RX 255 CONTRASTS: Performed by: EMERGENCY MEDICINE

## 2025-06-01 PROCEDURE — 99223 1ST HOSP IP/OBS HIGH 75: CPT | Performed by: INTERNAL MEDICINE

## 2025-06-01 PROCEDURE — 73700 CT LOWER EXTREMITY W/O DYE: CPT | Mod: LT,RCN

## 2025-06-01 PROCEDURE — 82077 ASSAY SPEC XCP UR&BREATH IA: CPT | Performed by: EMERGENCY MEDICINE

## 2025-06-01 PROCEDURE — 73700 CT LOWER EXTREMITY W/O DYE: CPT | Mod: LEFT SIDE | Performed by: RADIOLOGY

## 2025-06-01 PROCEDURE — 80053 COMPREHEN METABOLIC PANEL: CPT | Performed by: EMERGENCY MEDICINE

## 2025-06-01 PROCEDURE — 2500000001 HC RX 250 WO HCPCS SELF ADMINISTERED DRUGS (ALT 637 FOR MEDICARE OP): Performed by: INTERNAL MEDICINE

## 2025-06-01 PROCEDURE — 93005 ELECTROCARDIOGRAM TRACING: CPT

## 2025-06-01 PROCEDURE — 73552 X-RAY EXAM OF FEMUR 2/>: CPT | Mod: LT

## 2025-06-01 PROCEDURE — 73502 X-RAY EXAM HIP UNI 2-3 VIEWS: CPT | Mod: LEFT SIDE | Performed by: RADIOLOGY

## 2025-06-01 PROCEDURE — 83605 ASSAY OF LACTIC ACID: CPT | Performed by: EMERGENCY MEDICINE

## 2025-06-01 PROCEDURE — 71260 CT THORAX DX C+: CPT | Performed by: RADIOLOGY

## 2025-06-01 PROCEDURE — 85025 COMPLETE CBC W/AUTO DIFF WBC: CPT | Performed by: EMERGENCY MEDICINE

## 2025-06-01 PROCEDURE — 72125 CT NECK SPINE W/O DYE: CPT

## 2025-06-01 PROCEDURE — 73502 X-RAY EXAM HIP UNI 2-3 VIEWS: CPT | Mod: LT

## 2025-06-01 PROCEDURE — 96360 HYDRATION IV INFUSION INIT: CPT

## 2025-06-01 PROCEDURE — 99221 1ST HOSP IP/OBS SF/LOW 40: CPT

## 2025-06-01 PROCEDURE — 72170 X-RAY EXAM OF PELVIS: CPT | Performed by: RADIOLOGY

## 2025-06-01 PROCEDURE — 73552 X-RAY EXAM OF FEMUR 2/>: CPT | Mod: LEFT SIDE | Performed by: RADIOLOGY

## 2025-06-01 PROCEDURE — 72170 X-RAY EXAM OF PELVIS: CPT

## 2025-06-01 PROCEDURE — 86901 BLOOD TYPING SEROLOGIC RH(D): CPT | Performed by: EMERGENCY MEDICINE

## 2025-06-01 PROCEDURE — 85610 PROTHROMBIN TIME: CPT | Performed by: EMERGENCY MEDICINE

## 2025-06-01 PROCEDURE — 74177 CT ABD & PELVIS W/CONTRAST: CPT | Performed by: RADIOLOGY

## 2025-06-01 PROCEDURE — 71045 X-RAY EXAM CHEST 1 VIEW: CPT

## 2025-06-01 PROCEDURE — 72125 CT NECK SPINE W/O DYE: CPT | Performed by: RADIOLOGY

## 2025-06-01 PROCEDURE — 72131 CT LUMBAR SPINE W/O DYE: CPT | Performed by: RADIOLOGY

## 2025-06-01 PROCEDURE — 2500000002 HC RX 250 W HCPCS SELF ADMINISTERED DRUGS (ALT 637 FOR MEDICARE OP, ALT 636 FOR OP/ED): Performed by: INTERNAL MEDICINE

## 2025-06-01 RX ORDER — HEPARIN SODIUM 5000 [USP'U]/ML
5000 INJECTION, SOLUTION INTRAVENOUS; SUBCUTANEOUS EVERY 8 HOURS
Status: DISCONTINUED | OUTPATIENT
Start: 2025-06-01 | End: 2025-06-04 | Stop reason: HOSPADM

## 2025-06-01 RX ORDER — PANTOPRAZOLE SODIUM 40 MG/1
40 TABLET, DELAYED RELEASE ORAL
Status: DISCONTINUED | OUTPATIENT
Start: 2025-06-01 | End: 2025-06-04 | Stop reason: HOSPADM

## 2025-06-01 RX ORDER — HYDROMORPHONE HYDROCHLORIDE 0.2 MG/ML
0.2 INJECTION INTRAMUSCULAR; INTRAVENOUS; SUBCUTANEOUS EVERY 4 HOURS PRN
Status: DISCONTINUED | OUTPATIENT
Start: 2025-06-01 | End: 2025-06-03

## 2025-06-01 RX ORDER — PANTOPRAZOLE SODIUM 40 MG/10ML
40 INJECTION, POWDER, LYOPHILIZED, FOR SOLUTION INTRAVENOUS
Status: DISCONTINUED | OUTPATIENT
Start: 2025-06-01 | End: 2025-06-04 | Stop reason: HOSPADM

## 2025-06-01 RX ORDER — LISINOPRIL 20 MG/1
20 TABLET ORAL DAILY
Status: DISCONTINUED | OUTPATIENT
Start: 2025-06-01 | End: 2025-06-04 | Stop reason: HOSPADM

## 2025-06-01 RX ORDER — ACETAMINOPHEN 160 MG/5ML
650 SOLUTION ORAL EVERY 4 HOURS PRN
Status: DISCONTINUED | OUTPATIENT
Start: 2025-06-01 | End: 2025-06-04 | Stop reason: HOSPADM

## 2025-06-01 RX ORDER — ACETAMINOPHEN 325 MG/1
650 TABLET ORAL EVERY 4 HOURS PRN
Status: DISCONTINUED | OUTPATIENT
Start: 2025-06-01 | End: 2025-06-04 | Stop reason: HOSPADM

## 2025-06-01 RX ORDER — SERTRALINE HYDROCHLORIDE 50 MG/1
100 TABLET, FILM COATED ORAL DAILY
Status: DISCONTINUED | OUTPATIENT
Start: 2025-06-01 | End: 2025-06-04 | Stop reason: HOSPADM

## 2025-06-01 RX ORDER — ONDANSETRON 4 MG/1
4 TABLET, FILM COATED ORAL EVERY 8 HOURS PRN
Status: DISCONTINUED | OUTPATIENT
Start: 2025-06-01 | End: 2025-06-04 | Stop reason: HOSPADM

## 2025-06-01 RX ORDER — AMLODIPINE BESYLATE 5 MG/1
7.5 TABLET ORAL DAILY
Status: DISCONTINUED | OUTPATIENT
Start: 2025-06-01 | End: 2025-06-04 | Stop reason: HOSPADM

## 2025-06-01 RX ORDER — ASPIRIN 81 MG/1
81 TABLET ORAL DAILY
Status: DISCONTINUED | OUTPATIENT
Start: 2025-06-01 | End: 2025-06-04 | Stop reason: HOSPADM

## 2025-06-01 RX ORDER — POLYETHYLENE GLYCOL 3350 17 G/17G
17 POWDER, FOR SOLUTION ORAL DAILY
Status: DISCONTINUED | OUTPATIENT
Start: 2025-06-01 | End: 2025-06-04 | Stop reason: HOSPADM

## 2025-06-01 RX ORDER — ONDANSETRON HYDROCHLORIDE 2 MG/ML
4 INJECTION, SOLUTION INTRAVENOUS EVERY 8 HOURS PRN
Status: DISCONTINUED | OUTPATIENT
Start: 2025-06-01 | End: 2025-06-04 | Stop reason: HOSPADM

## 2025-06-01 RX ORDER — TAMSULOSIN HYDROCHLORIDE 0.4 MG/1
0.4 CAPSULE ORAL DAILY
Status: DISCONTINUED | OUTPATIENT
Start: 2025-06-01 | End: 2025-06-04 | Stop reason: HOSPADM

## 2025-06-01 RX ORDER — ACETAMINOPHEN 650 MG/1
650 SUPPOSITORY RECTAL EVERY 4 HOURS PRN
Status: DISCONTINUED | OUTPATIENT
Start: 2025-06-01 | End: 2025-06-04 | Stop reason: HOSPADM

## 2025-06-01 RX ORDER — OXYBUTYNIN CHLORIDE 5 MG/1
5 TABLET ORAL 2 TIMES DAILY
Status: DISCONTINUED | OUTPATIENT
Start: 2025-06-01 | End: 2025-06-04 | Stop reason: HOSPADM

## 2025-06-01 RX ORDER — SODIUM CHLORIDE, SODIUM LACTATE, POTASSIUM CHLORIDE, CALCIUM CHLORIDE 600; 310; 30; 20 MG/100ML; MG/100ML; MG/100ML; MG/100ML
100 INJECTION, SOLUTION INTRAVENOUS CONTINUOUS
Status: ACTIVE | OUTPATIENT
Start: 2025-06-01 | End: 2025-06-02

## 2025-06-01 RX ORDER — ATORVASTATIN CALCIUM 20 MG/1
20 TABLET, FILM COATED ORAL NIGHTLY
Status: DISCONTINUED | OUTPATIENT
Start: 2025-06-01 | End: 2025-06-04 | Stop reason: HOSPADM

## 2025-06-01 RX ADMIN — ATORVASTATIN CALCIUM 20 MG: 20 TABLET, FILM COATED ORAL at 21:37

## 2025-06-01 RX ADMIN — SODIUM CHLORIDE, POTASSIUM CHLORIDE, SODIUM LACTATE AND CALCIUM CHLORIDE 100 ML/HR: 600; 310; 30; 20 INJECTION, SOLUTION INTRAVENOUS at 11:19

## 2025-06-01 RX ADMIN — SODIUM CHLORIDE, POTASSIUM CHLORIDE, SODIUM LACTATE AND CALCIUM CHLORIDE 1000 ML: 600; 310; 30; 20 INJECTION, SOLUTION INTRAVENOUS at 00:51

## 2025-06-01 RX ADMIN — IOHEXOL 100 ML: 350 INJECTION, SOLUTION INTRAVENOUS at 01:14

## 2025-06-01 RX ADMIN — OXYBUTYNIN CHLORIDE 5 MG: 5 TABLET ORAL at 21:37

## 2025-06-01 SDOH — SOCIAL STABILITY: SOCIAL INSECURITY: WITHIN THE LAST YEAR, HAVE YOU BEEN AFRAID OF YOUR PARTNER OR EX-PARTNER?: NO

## 2025-06-01 SDOH — SOCIAL STABILITY: SOCIAL INSECURITY: ARE YOU OR HAVE YOU BEEN THREATENED OR ABUSED PHYSICALLY, EMOTIONALLY, OR SEXUALLY BY ANYONE?: NO

## 2025-06-01 SDOH — SOCIAL STABILITY: SOCIAL INSECURITY: WITHIN THE LAST YEAR, HAVE YOU BEEN HUMILIATED OR EMOTIONALLY ABUSED IN OTHER WAYS BY YOUR PARTNER OR EX-PARTNER?: NO

## 2025-06-01 SDOH — ECONOMIC STABILITY: FOOD INSECURITY: WITHIN THE PAST 12 MONTHS, THE FOOD YOU BOUGHT JUST DIDN'T LAST AND YOU DIDN'T HAVE MONEY TO GET MORE.: NEVER TRUE

## 2025-06-01 SDOH — SOCIAL STABILITY: SOCIAL INSECURITY
WITHIN THE LAST YEAR, HAVE YOU BEEN RAPED OR FORCED TO HAVE ANY KIND OF SEXUAL ACTIVITY BY YOUR PARTNER OR EX-PARTNER?: NO

## 2025-06-01 SDOH — SOCIAL STABILITY: SOCIAL INSECURITY: WERE YOU ABLE TO COMPLETE ALL THE BEHAVIORAL HEALTH SCREENINGS?: YES

## 2025-06-01 SDOH — ECONOMIC STABILITY: INCOME INSECURITY: IN THE PAST 12 MONTHS HAS THE ELECTRIC, GAS, OIL, OR WATER COMPANY THREATENED TO SHUT OFF SERVICES IN YOUR HOME?: NO

## 2025-06-01 SDOH — SOCIAL STABILITY: SOCIAL INSECURITY
WITHIN THE LAST YEAR, HAVE YOU BEEN KICKED, HIT, SLAPPED, OR OTHERWISE PHYSICALLY HURT BY YOUR PARTNER OR EX-PARTNER?: NO

## 2025-06-01 SDOH — SOCIAL STABILITY: SOCIAL INSECURITY: HAS ANYONE EVER THREATENED TO HURT YOUR FAMILY OR YOUR PETS?: NO

## 2025-06-01 SDOH — SOCIAL STABILITY: SOCIAL INSECURITY: ABUSE: ADULT

## 2025-06-01 SDOH — SOCIAL STABILITY: SOCIAL INSECURITY: HAVE YOU HAD ANY THOUGHTS OF HARMING ANYONE ELSE?: NO

## 2025-06-01 SDOH — ECONOMIC STABILITY: FOOD INSECURITY: WITHIN THE PAST 12 MONTHS, YOU WORRIED THAT YOUR FOOD WOULD RUN OUT BEFORE YOU GOT THE MONEY TO BUY MORE.: NEVER TRUE

## 2025-06-01 SDOH — SOCIAL STABILITY: SOCIAL INSECURITY: DO YOU FEEL ANYONE HAS EXPLOITED OR TAKEN ADVANTAGE OF YOU FINANCIALLY OR OF YOUR PERSONAL PROPERTY?: NO

## 2025-06-01 SDOH — SOCIAL STABILITY: SOCIAL INSECURITY: ARE THERE ANY APPARENT SIGNS OF INJURIES/BEHAVIORS THAT COULD BE RELATED TO ABUSE/NEGLECT?: NO

## 2025-06-01 SDOH — SOCIAL STABILITY: SOCIAL INSECURITY: DOES ANYONE TRY TO KEEP YOU FROM HAVING/CONTACTING OTHER FRIENDS OR DOING THINGS OUTSIDE YOUR HOME?: NO

## 2025-06-01 SDOH — SOCIAL STABILITY: SOCIAL INSECURITY: HAVE YOU HAD THOUGHTS OF HARMING ANYONE ELSE?: NO

## 2025-06-01 SDOH — SOCIAL STABILITY: SOCIAL INSECURITY: DO YOU FEEL UNSAFE GOING BACK TO THE PLACE WHERE YOU ARE LIVING?: NO

## 2025-06-01 ASSESSMENT — COGNITIVE AND FUNCTIONAL STATUS - GENERAL
MOVING FROM LYING ON BACK TO SITTING ON SIDE OF FLAT BED WITH BEDRAILS: A LITTLE
STANDING UP FROM CHAIR USING ARMS: A LOT
MOVING TO AND FROM BED TO CHAIR: A LOT
TOILETING: A LITTLE
DAILY ACTIVITIY SCORE: 18
HELP NEEDED FOR BATHING: A LITTLE
CLIMB 3 TO 5 STEPS WITH RAILING: TOTAL
DRESSING REGULAR LOWER BODY CLOTHING: A LITTLE
WALKING IN HOSPITAL ROOM: A LOT
DRESSING REGULAR UPPER BODY CLOTHING: A LITTLE
WALKING IN HOSPITAL ROOM: A LOT
DAILY ACTIVITIY SCORE: 21
STANDING UP FROM CHAIR USING ARMS: A LITTLE
HELP NEEDED FOR BATHING: A LITTLE
PERSONAL GROOMING: A LITTLE
TOILETING: A LITTLE
TURNING FROM BACK TO SIDE WHILE IN FLAT BAD: A LITTLE
MOBILITY SCORE: 13
DRESSING REGULAR LOWER BODY CLOTHING: A LOT
MOVING TO AND FROM BED TO CHAIR: A LITTLE
PATIENT BASELINE BEDBOUND: UNABLE TO ASSESS AT THIS TIME
CLIMB 3 TO 5 STEPS WITH RAILING: A LOT
MOBILITY SCORE: 18

## 2025-06-01 ASSESSMENT — ACTIVITIES OF DAILY LIVING (ADL)
ADEQUATE_TO_COMPLETE_ADL: YES
WALKS IN HOME: NEEDS ASSISTANCE
GROOMING: INDEPENDENT
TOILETING: INDEPENDENT
DRESSING YOURSELF: INDEPENDENT
JUDGMENT_ADEQUATE_SAFELY_COMPLETE_DAILY_ACTIVITIES: YES
LACK_OF_TRANSPORTATION: NO
BATHING: INDEPENDENT
ASSISTIVE_DEVICE: EYEGLASSES
FEEDING YOURSELF: INDEPENDENT
HEARING - LEFT EAR: FUNCTIONAL
HEARING - RIGHT EAR: FUNCTIONAL
PATIENT'S MEMORY ADEQUATE TO SAFELY COMPLETE DAILY ACTIVITIES?: YES

## 2025-06-01 ASSESSMENT — PAIN - FUNCTIONAL ASSESSMENT
PAIN_FUNCTIONAL_ASSESSMENT: 0-10

## 2025-06-01 ASSESSMENT — PATIENT HEALTH QUESTIONNAIRE - PHQ9
SUM OF ALL RESPONSES TO PHQ9 QUESTIONS 1 & 2: 0
2. FEELING DOWN, DEPRESSED OR HOPELESS: NOT AT ALL
1. LITTLE INTEREST OR PLEASURE IN DOING THINGS: NOT AT ALL

## 2025-06-01 ASSESSMENT — PAIN SCALES - GENERAL
PAINLEVEL_OUTOF10: 0 - NO PAIN

## 2025-06-01 ASSESSMENT — LIFESTYLE VARIABLES
SKIP TO QUESTIONS 9-10: 1
AUDIT-C TOTAL SCORE: 0
HOW OFTEN DO YOU HAVE A DRINK CONTAINING ALCOHOL: NEVER
HOW OFTEN DO YOU HAVE 6 OR MORE DRINKS ON ONE OCCASION: NEVER
HOW MANY STANDARD DRINKS CONTAINING ALCOHOL DO YOU HAVE ON A TYPICAL DAY: PATIENT DOES NOT DRINK
AUDIT-C TOTAL SCORE: 0

## 2025-06-01 ASSESSMENT — PAIN DESCRIPTION - DESCRIPTORS: DESCRIPTORS: DISCOMFORT

## 2025-06-01 NOTE — PROGRESS NOTES
Pharmacy Medication History Review   Spoke to the patient, no current medication changes. Trospium was decreased to daily. And some pills are not current with pharmacy fills. Pt stated that he has an abundance of pills.      Levon Mckay is a 85 y.o. male admitted for Periprosthetic fracture of hip, subsequent encounter. Pharmacy reviewed the patient's [id number] medications and allergies for accuracy.    The list below reflectives the updated PTA list. Please review each medication in order reconciliation for additional clarification and justification.     Prior to Admission Medications   Prescriptions Last Dose   amLODIPine (Norvasc) 5 mg tablet 2025 Morning   Sig: Take 1.5 tablets (7.5 mg) by mouth once daily.   aspirin 81 mg capsule 2025 Morning   Si capsule once every 24 hours.   atorvastatin (Lipitor) 20 mg tablet 2025 Morning   Sig: TAKE 1 TABLET BY MOUTH ONCE  DAILY AS DIRECTED   lisinopril 20 mg tablet 2025 Morning   Sig: TAKE 1 TABLET BY MOUTH DAILY   omeprazole (PriLOSEC) 40 mg DR capsule Not Taking   Sig: Take 1 capsule (40 mg) by mouth once daily in the morning. Take before meals. Do not crush or chew.   Patient not taking: Reported on 2025   sertraline (Zoloft) 100 mg tablet 2025 Morning   Sig: TAKE 1 TABLET BY MOUTH DAILY   tamsulosin (Flomax) 0.4 mg 24 hr capsule 2025 Morning   Sig: Take 1 capsule (0.4 mg) by mouth once daily.   trospium (Sanctura) 20 mg tablet 2025 Morning   Sig: Take 1 tablet (20 mg) by mouth once daily.      Facility-Administered Medications: None       The list below reflectives the updated allergy list. Please review each documented allergy for additional clarification and justification.  Allergies  Reviewed by Diana Eller on 2025        Severity Reactions Comments    Other Not Specified Unknown             Below are additional concerns with the patient's PTA list.      Diana Eller

## 2025-06-01 NOTE — ED PROVIDER NOTES
HPI   Chief Complaint   Patient presents with    Fall     Patient fell off stool , stool gave way patient fell complains of left hip pain patient history of CVA left side deficit, no LOC no thinners       The patient is an 85-year-old male past medical history of ischemic CVA, not on anticoagulation (per the patient) hypertension, hyperlipidemia presenting with left hip pain/left sided abdominal pain after a fall.  Patient states that he was sitting on a stool at home when he fell off onto his left side.  Denies striking his head or losing consciousness.  Notes onset of pain over the left hip, left abdomen, notes a sensation of nausea after impacting the ground.  Was able to pull himself back up onto the stool but was unable to bear weight on the left lower extremity.  Denies any abdominal bruising, denies any bleeding over the area.  Per patient's caregiver at bedside, patient is currently at his baseline mental status, denies any recent medication changes, patient denies any dizziness/lightheadedness prior to or following the fall and denies any chest pain/palpitations prior to or following the fall.  Denies other recent illness or injury.  Denies any history of coagulopathy or anticoagulant medication use              Patient History   Medical History[1]  Surgical History[2]  Family History[3]  Social History[4]    Physical Exam   ED Triage Vitals [05/31/25 2344]   Temperature Heart Rate Respirations BP   36.8 °C (98.2 °F) 79 20 104/61      Pulse Ox Temp Source Heart Rate Source Patient Position   96 % Oral Monitor --      BP Location FiO2 (%)     -- --       Physical Exam  Vitals and nursing note reviewed.   Constitutional:       General: He is not in acute distress.     Appearance: Normal appearance. He is not ill-appearing or toxic-appearing.   HENT:      Head: Normocephalic and atraumatic.      Nose: No congestion or rhinorrhea.      Mouth/Throat:      Mouth: Mucous membranes are moist.      Pharynx:  Oropharynx is clear. No oropharyngeal exudate or posterior oropharyngeal erythema.   Eyes:      Extraocular Movements: Extraocular movements intact.      Right eye: Normal extraocular motion.      Left eye: Normal extraocular motion.      Conjunctiva/sclera: Conjunctivae normal.      Pupils: Pupils are equal, round, and reactive to light.   Cardiovascular:      Rate and Rhythm: Normal rate and regular rhythm.      Pulses: Normal pulses.           Radial pulses are 2+ on the right side and 2+ on the left side.        Popliteal pulses are 2+ on the right side and 2+ on the left side.      Heart sounds: Normal heart sounds, S1 normal and S2 normal. No murmur heard.     No friction rub. No gallop.   Pulmonary:      Effort: Pulmonary effort is normal. No respiratory distress.      Breath sounds: Normal breath sounds. No stridor. No decreased breath sounds, wheezing, rhonchi or rales.   Abdominal:      General: Abdomen is flat. Bowel sounds are normal. There is no distension.      Palpations: Abdomen is soft.      Tenderness: There is abdominal tenderness in the left upper quadrant and left lower quadrant. There is no right CVA tenderness, left CVA tenderness, guarding or rebound.   Musculoskeletal:      Right shoulder: Normal.      Left shoulder: Normal.      Right upper arm: Normal.      Left upper arm: Normal.      Right elbow: Normal.      Left elbow: Normal.      Right forearm: Normal.      Left forearm: Normal.      Right wrist: Normal.      Left wrist: Normal.      Right hand: Normal.      Left hand: Normal.      Cervical back: Normal and full passive range of motion without pain. No spinous process tenderness or muscular tenderness.      Thoracic back: Normal.      Lumbar back: Normal.      Right hip: Normal.      Left hip: Laceration, tenderness and bony tenderness present. No crepitus.      Right upper leg: Normal.      Left upper leg: Tenderness present.      Right lower leg: No edema.      Left lower leg: No  edema.      Right ankle: Normal.      Left ankle: Normal.      Right foot: Normal.      Left foot: Normal.   Skin:     General: Skin is warm and dry.   Neurological:      General: No focal deficit present.      Mental Status: He is alert and oriented to person, place, and time.      GCS: GCS eye subscore is 4. GCS verbal subscore is 5. GCS motor subscore is 6.      Cranial Nerves: No cranial nerve deficit.      Sensory: No sensory deficit.      Motor: No weakness, tremor or abnormal muscle tone.   Psychiatric:         Mood and Affect: Mood normal.           ED Course & MDM   ED Course as of 06/01/25 0805   Sun Jun 01, 2025   0349 Patient's pan CT imaging demonstrating periprosthetic hip fracture on the left, otherwise no acute traumatic injury of the head, C/T/L-spine, chest/abdomen/pelvis.  Did discuss case with orthopedic surgery at [facility] who [place] [facility] [place]. [BR]   0349   Remains neurovascularly intact in all 4 extremities at time of my reassessment. [BR]   0650 Discussed case with Dr. Sam of orthopedic surgery via the transfer center, he requests case be discussed further with ortho at [facility] did discuss case further with orthopedic resident on-call who evaluated the patient in the ED and feels comfortable with admission here at [facility]. [BR]      ED Course User Index  [BR] Tal Anasri MD         Diagnoses as of 06/01/25 0805   Periprosthetic fracture of hip, subsequent encounter                 No data recorded     Jamel Coma Scale Score: 15 (05/31/25 2341 : Keegan Gandara RN)                           Medical Decision Making  ECG interpreted by me: Sinus rhythm, rate 87, QS 22, QTc 464.  No ST changes meeting STEMI criteria    Patient presenting with left hip/left-sided abdominal pain after an apparent mechanical fall this evening.  On examination patient is awake, alert, oriented, protecting his airway, clear lung sounds  bilaterally, warm and well-perfused extremities throughout, does have moderately tender abdomen/left hip, no obvious bruising or other outward evidence of traumatic injury save for left hip tenderness on exam.  Will assess with akbar CT imaging given patient's age and moderate abdominal tenderness (although no macho peritonitis on exam).  Will obtain screening labs as well as ECG, troponin to assess for atypical cardiac pathology although seems less likely as patient is denying any chest pain or dyspnea currently.  Disposition pending labs, imaging results, reassessment      Patient CT imaging shows left periprosthetic hip fracture, no other acute traumatic injury.  Does show 4 cm ascending aortic aneurysm however the patient denies any chest pain, dyspnea currently and has a benign neurovascular examination, suspect this is incidental in nature.  Initial lactate elevated at 2.3 but downtrended after IV fluid administration.  No acute intra-abdominal otology noted on CT scan.  Did discuss the case with orthopedic surgery via the surgical [facility] at [facility] who discussed the case with the resident orthopedic surgeon on-call who recommended transfer to [facility] [place]/orthopedic surgery evaluation, discussed case with Dr. Sam of orthopedic surgery at [facility] who feels the patient can be appropriately managed at [facility], therefore did reengage orthopedic surgery attending/resident who agrees that patient can be managed safely at [facility], patient remained hemodynamically stable and well-appearing throughout his ED stay, pain is well-controlled.  Per discussion with orthopedic surgery here will plan to admit to medicine service [facility] for further evaluation and management of left periprosthetic hip fracture admitted in stable condition.        Procedure  Procedures       [1]   Past Medical History:  Diagnosis Date    Prostate cancer (Multi)     Stroke (Multi)    [2]   Past Surgical History:  Procedure  Laterality Date    OTHER SURGICAL HISTORY  06/28/2022    Appendectomy    OTHER SURGICAL HISTORY  06/28/2022    Hip surgery    OTHER SURGICAL HISTORY  12/07/2022    Tonsillectomy with adenoidectomy   [3] No family history on file.  [4]   Social History  Tobacco Use    Smoking status: Never     Passive exposure: Never    Smokeless tobacco: Never   Substance Use Topics    Alcohol use: Not Currently    Drug use: Never        Tal Ansari MD  06/01/25 0814

## 2025-06-01 NOTE — CONSULTS
Reason For Consult  Periprosthetic fracture    History Of Present Illness  Levon Mckay is a 85 y.o. male presenting s/p fall. He was in the process of sitting down on a bar stool at home, slipped off and fell onto his left hip. He lives with his family and family was not home at the time of the fall. He was able to get himself into a chair after the fall. When family got home, he was sitting in the chair and family went to get him up and he was unable to walk. When family sat him back down, he did have a syncopal episode that lasted about 1 minute.     Patient underwent a L hip hemiarthroplasty in [place] in 2020(?) after sustaining a fall. He currently denies any LLE numbness or tingling. He normally walks with a cane. He does have a history of a stroke and reports that his left side is his weaker side. He does not take any blood thinners, besides a baby Aspirin.     In the ED, he is afebrile, not tachycardic and most recent /80. WBC 12.2, H/H 13.2/38.9, lactate 2.3, PT/INR 13/1.2. CT C/A/P revealed an acute, non-displaced periprosthetic fracture at the anterior aspect of the proximal left femur along the femoral stem. Due to this, orthopedic surgery was consulted for further evaluation and treatment.     Past Medical History  He has a past medical history of Prostate cancer (Multi) and Stroke (Multi).    Surgical History  He has a past surgical history that includes Other surgical history (06/28/2022); Other surgical history (06/28/2022); and Other surgical history (12/07/2022).     Social History  He reports that he has never smoked. He has never been exposed to tobacco smoke. He has never used smokeless tobacco. He reports that he does not currently use alcohol. He reports that he does not use drugs.    Family History  Family History[1]     Allergies  Other    Review of Systems  MUSK: + pain, decreased ROM. Denies numbness or tingling.     Physical Exam  Constitutional: Awake/alert, no distress,  cooperative. Appears slightly confused at times.  Skin: Warm and dry.  Eyes: EOMI.  ENMT: Mucus membranes moist, no apparent injury.  Head/Neck: Neck supple, no apparent injury.  Respiratory: Unlabored breathing via NC.   Cardiovascular: Regular rate.  Musculoskeletal: ROM of L hip deferred due to known injury. Injury closed, LLE NVI. Able to wiggle toes on LLE. LLE slightly shortened. 2+ LLE DP pulse palpated.     Last Recorded Vitals  Blood pressure 158/80, pulse 87, temperature 36.8 °C (98.2 °F), temperature source Oral, resp. rate 20, height 1.829 m (6'), weight 75.8 kg (167 lb), SpO2 95%.    Relevant Results  Results for orders placed or performed during the hospital encounter of 05/31/25 (from the past 24 hours)   CBC and Auto Differential   Result Value Ref Range    WBC 12.2 (H) 4.4 - 11.3 x10*3/uL    nRBC 0.0 0.0 - 0.0 /100 WBCs    RBC 4.00 (L) 4.50 - 5.90 x10*6/uL    Hemoglobin 13.2 (L) 13.5 - 17.5 g/dL    Hematocrit 38.9 (L) 41.0 - 52.0 %    MCV 97 80 - 100 fL    MCH 33.0 26.0 - 34.0 pg    MCHC 33.9 32.0 - 36.0 g/dL    RDW 12.4 11.5 - 14.5 %    Platelets 130 (L) 150 - 450 x10*3/uL    Neutrophils % 88.1 40.0 - 80.0 %    Immature Granulocytes %, Automated 0.3 0.0 - 0.9 %    Lymphocytes % 4.9 13.0 - 44.0 %    Monocytes % 6.2 2.0 - 10.0 %    Eosinophils % 0.3 0.0 - 6.0 %    Basophils % 0.2 0.0 - 2.0 %    Neutrophils Absolute 10.74 (H) 1.60 - 5.50 x10*3/uL    Immature Granulocytes Absolute, Automated 0.04 0.00 - 0.50 x10*3/uL    Lymphocytes Absolute 0.60 (L) 0.80 - 3.00 x10*3/uL    Monocytes Absolute 0.76 0.05 - 0.80 x10*3/uL    Eosinophils Absolute 0.04 0.00 - 0.40 x10*3/uL    Basophils Absolute 0.02 0.00 - 0.10 x10*3/uL   Comprehensive Metabolic Panel   Result Value Ref Range    Glucose 116 (H) 74 - 99 mg/dL    Sodium 140 136 - 145 mmol/L    Potassium 4.3 3.5 - 5.3 mmol/L    Chloride 107 98 - 107 mmol/L    Bicarbonate 23 21 - 32 mmol/L    Anion Gap 14 10 - 20 mmol/L    Urea Nitrogen 28 (H) 6 - 23 mg/dL     Creatinine 1.97 (H) 0.50 - 1.30 mg/dL    eGFR 33 (L) >60 mL/min/1.73m*2    Calcium 8.9 8.6 - 10.3 mg/dL    Albumin 4.2 3.4 - 5.0 g/dL    Alkaline Phosphatase 110 33 - 136 U/L    Total Protein 6.3 (L) 6.4 - 8.2 g/dL    AST 26 9 - 39 U/L    Bilirubin, Total 0.5 0.0 - 1.2 mg/dL    ALT 31 10 - 52 U/L   Alcohol   Result Value Ref Range    Alcohol <10 <=10 mg/dL   Lactate   Result Value Ref Range    Lactate 2.3 (H) 0.4 - 2.0 mmol/L   Protime-INR   Result Value Ref Range    Protime 13.0 (H) 9.8 - 12.4 seconds    INR 1.2 (H) 0.9 - 1.1   Type And Screen   Result Value Ref Range    ABO TYPE O     Rh TYPE POS     ANTIBODY SCREEN NEG    Troponin I, High Sensitivity   Result Value Ref Range    Troponin I, High Sensitivity 9 0 - 20 ng/L      XR pelvis 1-2 views  Result Date: 6/1/2025  Interpreted By:  Kavita Fu, STUDY: XR PELVIS 1-2 VIEWS;  6/1/2025 1:28 am   INDICATION: Signs/Symptoms:Fall, left hip pain.   COMPARISON: Left hip x-ray 06/03/2022. CT chest, abdomen, pelvis 06/01/2025.   ACCESSION NUMBER(S): [id number]   ORDERING CLINICIAN: ASHLI VILLA   FINDINGS: 2 AP views of the pelvis were obtained.   There is diffuse osteopenia. The patient is status post left hip hemiarthroplasty. The previously described acute, nondisplaced, fracture along the femoral stem at the proximal left femur is not visualized by plain radiograph. There is a stable appearance of the orthopedic hardware. The pelvic ring appears intact.  The soft tissues are unremarkable.       1.  Osteopenia. Status post left hip hemiarthroplasty. The previously described acute, nondisplaced, periprosthetic fracture at the proximal left femur on prior CT is not visualized by plain radiograph.       MACRO: None.   Signed by: Kavita Fu 6/1/2025 2:19 AM Dictation workstation:   [id number]    XR chest 1 view  Result Date: 6/1/2025  Interpreted By:  Kavita Fu, STUDY: XR CHEST 1 VIEW;  6/1/2025 1:28 am   INDICATION: Signs/Symptoms:Fall, left sided pain      COMPARISON: Chest x-ray 05/29/2007. CT chest, abdomen, pelvis 06/01/2025.   ACCESSION NUMBER(S): [id number]   ORDERING CLINICIAN: ASHLI VILLA   TECHNIQUE: Frontal view of the chest was obtained .   FINDINGS: The cardiomediastinal silhouette is within normal limits.   No focal consolidation, pleural effusion or pneumothorax. There is mild bibasilar atelectasis.   There is elevation of the right hemidiaphragm.       1.  Mild bibasilar atelectasis.       MACRO: None.   Signed by: Kavita Fu 6/1/2025 2:16 AM Dictation workstation:   [id number]    CT chest abdomen pelvis w IV contrast  Result Date: 6/1/2025  Interpreted By:  Kavita Fu, STUDY: CT CHEST ABDOMEN PELVIS W IV CONTRAST; CT THORACIC SPINE RETROSPECTIVE RECONSTRUCTION PROTOCOL; CT LUMBAR SPINE RETROSPECTIVE RECONSTRUCTION PROTOCOL;  6/1/2025 1:25 am   INDICATION: Signs/Symptoms:fall, left sided abdominal pain, nausea; Signs/Symptoms:Fall   COMPARISON: CT pelvis 08/04/2023. MRI kidney 10/10/2003. PET/CT 01/25/2023.   ACCESSION NUMBER(S): [id number]; [id number]; [id number]   ORDERING CLINICIAN: ASHLI VILLA   TECHNIQUE: Axial CT of the chest, abdomen, and pelvis was obtained. Coronal and sagittal reconstructions were performed. Intravenous contrast material was administered without immediate complications. Multiplanar reformatted images of the thoracic spine and lumbar spine were obtained from concurrent CT chest/abdomen/pelvis.   FINDINGS: There is motion artifact. There is streak artifact from the patient's arms along the body and superimposed radiopaque densities.   CHEST:   LUNG/PLEURA/LARGE AIRWAYS: The trachea and the central airways are patent. No consolidation, pleural effusion or pneumothorax. There are mild bilateral atelectatic changes.   VESSELS: No traumatic aortic injury is appreciated within the limitations of this non-EKG gated study.  There is 4.0 cm aneurysmal dilation of the ascending thoracic aorta. Mild atherosclerotic  calcifications are noted at the thoracic aorta.     HEART: The heart is normal in size. There are mild coronary artery calcifications. There is no pericardial effusion.   MEDIASTINUM AND ELIANA: No pneumomediastinum, abnormal mediastinal fluid collection or mediastinal hematoma are appreciated.  No mediastinal, hilar or biaxillary adenopathy is present.   There is a small fluid containing hiatal hernia. There is fluid throughout the esophagus to the thoracic inlet.   CHEST WALL AND LOWER NECK: There is a 2.0 cm hypodense nodule at the right lobe of the thyroid gland. No acute fracture or dislocation of the included osseous structures are appreciated. There are healed rib fractures. The thoracic wall soft tissues are within normal limits.   THORACIC SPINE: The vertebral body alignment is maintained. There are remote compression deformities at T11 and T12. There is no acute fracture. There is  multilevel degenerative disc disease.  The prevertebral soft tissues are unremarkable.   ABDOMEN:   LIVER: There is elevation of the right hemidiaphragm with superior location of the liver. There are hepatic cysts measuring up to 3 cm. No focal perfusion abnormality of the liver is appreciated to suggest contusion or laceration. There is no subcapsular hematoma, no perihepatic fluid collection.   GALLBLADDER: Present.   BILE DUCTS: No biliary ductal dilation.   PANCREAS: No peripancreatic inflammatory changes or fluid collections.   SPLEEN: No parenchymal perfusion deficit of the spleen is appreciated to suggest contusion or laceration. There is no subcapsular hematoma, no perisplenic fluid collection.   ADRENAL GLANDS: The bilateral adrenal glands are unremarkable in appearance.   KIDNEYS AND URETERS: No parenchymal perfusion deficit is appreciated in bilateral kidneys to suggest contusion or laceration. There is no subcapsular hematoma, no perinephric fluid collection.  No hydronephrosis or hydroureter. There are bilateral renal  cysts. There is a 2.2 x 2.8 cm cystic lesion at the left kidney with a 1.5 x 0.7 cm peripheral enhancing soft tissue density.   PELVIS: The pelvis is partially obscured by streak artifact from the orthopedic hardware at the left hip.   BLADDER: Partially distended.   REPRODUCTIVE ORGANS: The prostate is partially obscured. The prostate measures approximately 5 cm in transverse diameter.   BOWEL: The stomach is distended with enteric contents. No dilated bowel loops or focal inflammatory changes. Moderate amount of stool at the colon.   VESSELS: Calcified and noncalcified plaque of the abdominal aorta and its branches. No abdominal aortic aneurysm. The principal vasculature of the abdomen and pelvis is patent as visualized.   PERITONEUM/RETROPERITONEUM/LYMPH NODES: There is no evidence of retroperitoneal hematoma.  There is no free fluid or free air.  No pathologically enlarged lymph nodes are identified.   BONES AND ABDOMINAL WALL: There is diffuse osteopenia. There is a healed fracture of the left inferior pubic bone. The patient is status post left hip arthroplasty with stable appearance of the orthopedic hardware. There is an acute nondisplaced fracture at the anterior aspect of proximal left femur along the femoral stem. There is a small fat containing umbilical hernia.   LUMBAR SPINE: The vertebral body alignment is maintained. There is a remote compression deformity at L3. There is no acute fracture.  There is multilevel degenerative disc disease.  The prevertebral soft tissues are unremarkable.       CHEST/THORACIC SPINE : 1.  No acute posttraumatic findings within the thorax. 2. Mild bilateral atelectasis. 3. 4.0 cm ascending thoracic aortic aneurysm. 4. 2.0 cm nodule at the right lobe of the thyroid gland. Nonemergent thyroid ultrasound can be obtained for further evaluation. 5. Small fluid-containing hiatal hernia with fluid throughout the esophagus to the thoracic inlet, suggestive of gastroesophageal  reflux. 6.  No acute fracture of the thoracic spine. Degenerative changes.   ABDOMEN - PELVIS/LUMBAR SPINE: 1. Status post left hip arthroplasty. Acute, nondisplaced, periprosthetic fracture at the anterior aspect of the proximal left femur along the femoral stem 2. Otherwise, no acute posttraumatic findings within the abdomen or pelvis. 3. 2.8 cm cystic lesion at the left kidney with peripheral soft tissue density, concerning for cystic neoplasm. Nonemergent contrast-enhanced MRI can be obtained for further characterization. 4. Prostatomegaly. 5. Moderate amount of stool at the colon. Please correlate for constipation. 6.  No acute fracture of the lumbar spine. Degenerative changes.     MACRO: Critical Finding:  See findings. Notification was initiated on 6/1/2025 at 2:10 am by  Kavita Fu.  (**-YCF-**) Instructions:   Signed by: Kavita Fu 6/1/2025 2:14 AM Dictation workstation:   [id number]    CT lumbar spine retrospective reconstruction protocol  Result Date: 6/1/2025  Interpreted By:  Kavita Fu, STUDY: CT CHEST ABDOMEN PELVIS W IV CONTRAST; CT THORACIC SPINE RETROSPECTIVE RECONSTRUCTION PROTOCOL; CT LUMBAR SPINE RETROSPECTIVE RECONSTRUCTION PROTOCOL;  6/1/2025 1:25 am   INDICATION: Signs/Symptoms:fall, left sided abdominal pain, nausea; Signs/Symptoms:Fall   COMPARISON: CT pelvis 08/04/2023. MRI kidney 10/10/2003. PET/CT 01/25/2023.   ACCESSION NUMBER(S): [id number]; [id number]; [id number]   ORDERING CLINICIAN: ASHLI VILLA   TECHNIQUE: Axial CT of the chest, abdomen, and pelvis was obtained. Coronal and sagittal reconstructions were performed. Intravenous contrast material was administered without immediate complications. Multiplanar reformatted images of the thoracic spine and lumbar spine were obtained from concurrent CT chest/abdomen/pelvis.   FINDINGS: There is motion artifact. There is streak artifact from the patient's arms along the body and superimposed radiopaque densities.    CHEST:   LUNG/PLEURA/LARGE AIRWAYS: The trachea and the central airways are patent. No consolidation, pleural effusion or pneumothorax. There are mild bilateral atelectatic changes.   VESSELS: No traumatic aortic injury is appreciated within the limitations of this non-EKG gated study.  There is 4.0 cm aneurysmal dilation of the ascending thoracic aorta. Mild atherosclerotic calcifications are noted at the thoracic aorta.     HEART: The heart is normal in size. There are mild coronary artery calcifications. There is no pericardial effusion.   MEDIASTINUM AND ELIANA: No pneumomediastinum, abnormal mediastinal fluid collection or mediastinal hematoma are appreciated.  No mediastinal, hilar or biaxillary adenopathy is present.   There is a small fluid containing hiatal hernia. There is fluid throughout the esophagus to the thoracic inlet.   CHEST WALL AND LOWER NECK: There is a 2.0 cm hypodense nodule at the right lobe of the thyroid gland. No acute fracture or dislocation of the included osseous structures are appreciated. There are healed rib fractures. The thoracic wall soft tissues are within normal limits.   THORACIC SPINE: The vertebral body alignment is maintained. There are remote compression deformities at T11 and T12. There is no acute fracture. There is  multilevel degenerative disc disease.  The prevertebral soft tissues are unremarkable.   ABDOMEN:   LIVER: There is elevation of the right hemidiaphragm with superior location of the liver. There are hepatic cysts measuring up to 3 cm. No focal perfusion abnormality of the liver is appreciated to suggest contusion or laceration. There is no subcapsular hematoma, no perihepatic fluid collection.   GALLBLADDER: Present.   BILE DUCTS: No biliary ductal dilation.   PANCREAS: No peripancreatic inflammatory changes or fluid collections.   SPLEEN: No parenchymal perfusion deficit of the spleen is appreciated to suggest contusion or laceration. There is no subcapsular  hematoma, no perisplenic fluid collection.   ADRENAL GLANDS: The bilateral adrenal glands are unremarkable in appearance.   KIDNEYS AND URETERS: No parenchymal perfusion deficit is appreciated in bilateral kidneys to suggest contusion or laceration. There is no subcapsular hematoma, no perinephric fluid collection.  No hydronephrosis or hydroureter. There are bilateral renal cysts. There is a 2.2 x 2.8 cm cystic lesion at the left kidney with a 1.5 x 0.7 cm peripheral enhancing soft tissue density.   PELVIS: The pelvis is partially obscured by streak artifact from the orthopedic hardware at the left hip.   BLADDER: Partially distended.   REPRODUCTIVE ORGANS: The prostate is partially obscured. The prostate measures approximately 5 cm in transverse diameter.   BOWEL: The stomach is distended with enteric contents. No dilated bowel loops or focal inflammatory changes. Moderate amount of stool at the colon.   VESSELS: Calcified and noncalcified plaque of the abdominal aorta and its branches. No abdominal aortic aneurysm. The principal vasculature of the abdomen and pelvis is patent as visualized.   PERITONEUM/RETROPERITONEUM/LYMPH NODES: There is no evidence of retroperitoneal hematoma.  There is no free fluid or free air.  No pathologically enlarged lymph nodes are identified.   BONES AND ABDOMINAL WALL: There is diffuse osteopenia. There is a healed fracture of the left inferior pubic bone. The patient is status post left hip arthroplasty with stable appearance of the orthopedic hardware. There is an acute nondisplaced fracture at the anterior aspect of proximal left femur along the femoral stem. There is a small fat containing umbilical hernia.   LUMBAR SPINE: The vertebral body alignment is maintained. There is a remote compression deformity at L3. There is no acute fracture.  There is multilevel degenerative disc disease.  The prevertebral soft tissues are unremarkable.       CHEST/THORACIC SPINE : 1.  No acute  posttraumatic findings within the thorax. 2. Mild bilateral atelectasis. 3. 4.0 cm ascending thoracic aortic aneurysm. 4. 2.0 cm nodule at the right lobe of the thyroid gland. Nonemergent thyroid ultrasound can be obtained for further evaluation. 5. Small fluid-containing hiatal hernia with fluid throughout the esophagus to the thoracic inlet, suggestive of gastroesophageal reflux. 6.  No acute fracture of the thoracic spine. Degenerative changes.   ABDOMEN - PELVIS/LUMBAR SPINE: 1. Status post left hip arthroplasty. Acute, nondisplaced, periprosthetic fracture at the anterior aspect of the proximal left femur along the femoral stem 2. Otherwise, no acute posttraumatic findings within the abdomen or pelvis. 3. 2.8 cm cystic lesion at the left kidney with peripheral soft tissue density, concerning for cystic neoplasm. Nonemergent contrast-enhanced MRI can be obtained for further characterization. 4. Prostatomegaly. 5. Moderate amount of stool at the colon. Please correlate for constipation. 6.  No acute fracture of the lumbar spine. Degenerative changes.     MACRO: Critical Finding:  See findings. Notification was initiated on 6/1/2025 at 2:10 am by  Kavita Fu.  (**-YCF-**) Instructions:   Signed by: Kavita Fu 6/1/2025 2:14 AM Dictation workstation:   [id number]    CT thoracic spine retrospective reconstruction protocol  Result Date: 6/1/2025  Interpreted By:  Kavita Fu, STUDY: CT CHEST ABDOMEN PELVIS W IV CONTRAST; CT THORACIC SPINE RETROSPECTIVE RECONSTRUCTION PROTOCOL; CT LUMBAR SPINE RETROSPECTIVE RECONSTRUCTION PROTOCOL;  6/1/2025 1:25 am   INDICATION: Signs/Symptoms:fall, left sided abdominal pain, nausea; Signs/Symptoms:Fall   COMPARISON: CT pelvis 08/04/2023. MRI kidney 10/10/2003. PET/CT 01/25/2023.   ACCESSION NUMBER(S): [id number]; [id number]; [id number]   ORDERING CLINICIAN: ASHLI VILLA   TECHNIQUE: Axial CT of the chest, abdomen, and pelvis was obtained. Coronal and sagittal  reconstructions were performed. Intravenous contrast material was administered without immediate complications. Multiplanar reformatted images of the thoracic spine and lumbar spine were obtained from concurrent CT chest/abdomen/pelvis.   FINDINGS: There is motion artifact. There is streak artifact from the patient's arms along the body and superimposed radiopaque densities.   CHEST:   LUNG/PLEURA/LARGE AIRWAYS: The trachea and the central airways are patent. No consolidation, pleural effusion or pneumothorax. There are mild bilateral atelectatic changes.   VESSELS: No traumatic aortic injury is appreciated within the limitations of this non-EKG gated study.  There is 4.0 cm aneurysmal dilation of the ascending thoracic aorta. Mild atherosclerotic calcifications are noted at the thoracic aorta.     HEART: The heart is normal in size. There are mild coronary artery calcifications. There is no pericardial effusion.   MEDIASTINUM AND ELIANA: No pneumomediastinum, abnormal mediastinal fluid collection or mediastinal hematoma are appreciated.  No mediastinal, hilar or biaxillary adenopathy is present.   There is a small fluid containing hiatal hernia. There is fluid throughout the esophagus to the thoracic inlet.   CHEST WALL AND LOWER NECK: There is a 2.0 cm hypodense nodule at the right lobe of the thyroid gland. No acute fracture or dislocation of the included osseous structures are appreciated. There are healed rib fractures. The thoracic wall soft tissues are within normal limits.   THORACIC SPINE: The vertebral body alignment is maintained. There are remote compression deformities at T11 and T12. There is no acute fracture. There is  multilevel degenerative disc disease.  The prevertebral soft tissues are unremarkable.   ABDOMEN:   LIVER: There is elevation of the right hemidiaphragm with superior location of the liver. There are hepatic cysts measuring up to 3 cm. No focal perfusion abnormality of the liver is  appreciated to suggest contusion or laceration. There is no subcapsular hematoma, no perihepatic fluid collection.   GALLBLADDER: Present.   BILE DUCTS: No biliary ductal dilation.   PANCREAS: No peripancreatic inflammatory changes or fluid collections.   SPLEEN: No parenchymal perfusion deficit of the spleen is appreciated to suggest contusion or laceration. There is no subcapsular hematoma, no perisplenic fluid collection.   ADRENAL GLANDS: The bilateral adrenal glands are unremarkable in appearance.   KIDNEYS AND URETERS: No parenchymal perfusion deficit is appreciated in bilateral kidneys to suggest contusion or laceration. There is no subcapsular hematoma, no perinephric fluid collection.  No hydronephrosis or hydroureter. There are bilateral renal cysts. There is a 2.2 x 2.8 cm cystic lesion at the left kidney with a 1.5 x 0.7 cm peripheral enhancing soft tissue density.   PELVIS: The pelvis is partially obscured by streak artifact from the orthopedic hardware at the left hip.   BLADDER: Partially distended.   REPRODUCTIVE ORGANS: The prostate is partially obscured. The prostate measures approximately 5 cm in transverse diameter.   BOWEL: The stomach is distended with enteric contents. No dilated bowel loops or focal inflammatory changes. Moderate amount of stool at the colon.   VESSELS: Calcified and noncalcified plaque of the abdominal aorta and its branches. No abdominal aortic aneurysm. The principal vasculature of the abdomen and pelvis is patent as visualized.   PERITONEUM/RETROPERITONEUM/LYMPH NODES: There is no evidence of retroperitoneal hematoma.  There is no free fluid or free air.  No pathologically enlarged lymph nodes are identified.   BONES AND ABDOMINAL WALL: There is diffuse osteopenia. There is a healed fracture of the left inferior pubic bone. The patient is status post left hip arthroplasty with stable appearance of the orthopedic hardware. There is an acute nondisplaced fracture at the  anterior aspect of proximal left femur along the femoral stem. There is a small fat containing umbilical hernia.   LUMBAR SPINE: The vertebral body alignment is maintained. There is a remote compression deformity at L3. There is no acute fracture.  There is multilevel degenerative disc disease.  The prevertebral soft tissues are unremarkable.       CHEST/THORACIC SPINE : 1.  No acute posttraumatic findings within the thorax. 2. Mild bilateral atelectasis. 3. 4.0 cm ascending thoracic aortic aneurysm. 4. 2.0 cm nodule at the right lobe of the thyroid gland. Nonemergent thyroid ultrasound can be obtained for further evaluation. 5. Small fluid-containing hiatal hernia with fluid throughout the esophagus to the thoracic inlet, suggestive of gastroesophageal reflux. 6.  No acute fracture of the thoracic spine. Degenerative changes.   ABDOMEN - PELVIS/LUMBAR SPINE: 1. Status post left hip arthroplasty. Acute, nondisplaced, periprosthetic fracture at the anterior aspect of the proximal left femur along the femoral stem 2. Otherwise, no acute posttraumatic findings within the abdomen or pelvis. 3. 2.8 cm cystic lesion at the left kidney with peripheral soft tissue density, concerning for cystic neoplasm. Nonemergent contrast-enhanced MRI can be obtained for further characterization. 4. Prostatomegaly. 5. Moderate amount of stool at the colon. Please correlate for constipation. 6.  No acute fracture of the lumbar spine. Degenerative changes.     MACRO: Critical Finding:  See findings. Notification was initiated on 6/1/2025 at 2:10 am by  Kavita Fu.  (**-YCF-**) Instructions:   Signed by: Kavita Fu 6/1/2025 2:14 AM Dictation workstation:   [id number]    CT head W O contrast trauma protocol  Result Date: 6/1/2025  Interpreted By:  Kavita Fu, STUDY: CT HEAD W/O CONTRAST TRAUMA PROTOCOL; CT CERVICAL SPINE WO IV CONTRAST;  6/1/2025 1:25 am   INDICATION: Signs/Symptoms:Fall.   COMPARISON: CT head and cervical  spine 01/19/2022   ACCESSION NUMBER(S): [id number]; [id number]   ORDERING CLINICIAN: ASHLI VILLA   TECHNIQUE: Axial noncontrast images of the head  with coronal  and sagittal reconstructed images . Axial noncontrast images of the cervical spine with coronal and sagittal reconstructed images.   FINDINGS: BRAIN PARENCHYMA: There are periventricular white matter hypodensities, probably representing chronic microvascular ischemic changes. Redemonstration of encephalomalacia at the left occipital lobe and at the maureen, compatible with remote infarcts. Otherwise, the gray-white matter interfaces are preserved. No acute territorial infarct, mass effect or midline shift. Redemonstration of cerebellar atrophy. HEMORRHAGE: No acute intracranial hemorrhage. VENTRICLES and EXTRA-AXIAL SPACES: Prominent, consistent with diffuse parenchymal volume loss. No extra-axial fluid collection. EXTRACRANIAL SOFT TISSUES: Within normal limits. CALVARIUM: No depressed calvarial fracture. PARANASAL SINUSES: No air-fluid levels. MASTOIDS: Within normal limits.   CERVICAL SPINE: ALIGNMENT: Within normal limits. VERTEBRAE: No acute fracture. Healed fractures at C1. DISCS: There is multilevel degenerative disc disease with prominent anterior osteophytosis. There is multilevel facet arthropathy. PREVERTEBRAL SOFT TISSUES: No prevertebral soft tissue swelling. LUNG APICES: No acute findings at the visualized lung apices. There is a 2.0 cm hypodense nodule at the right lobe of the thyroid gland       1.  No acute intracranial hemorrhage or depressed calvarial fracture. 2.  No acute fracture at the cervical spine. Degenerative changes. 3. 2.0 cm hypodense nodule at the right lobe of the thyroid gland. Nonemergent thyroid ultrasound can be obtained for further evaluation.       MACRO: None.   Signed by: Kavita Fu 6/1/2025 1:46 AM Dictation workstation:   [id number]    CT cervical spine wo IV contrast  Result Date: 6/1/2025  Interpreted By:   Kavita Fu, STUDY: CT HEAD W/O CONTRAST TRAUMA PROTOCOL; CT CERVICAL SPINE WO IV CONTRAST;  6/1/2025 1:25 am   INDICATION: Signs/Symptoms:Fall.   COMPARISON: CT head and cervical spine 01/19/2022   ACCESSION NUMBER(S): [id number]; [id number]   ORDERING CLINICIAN: AHSLI VILLA   TECHNIQUE: Axial noncontrast images of the head  with coronal  and sagittal reconstructed images . Axial noncontrast images of the cervical spine with coronal and sagittal reconstructed images.   FINDINGS: BRAIN PARENCHYMA: There are periventricular white matter hypodensities, probably representing chronic microvascular ischemic changes. Redemonstration of encephalomalacia at the left occipital lobe and at the maureen, compatible with remote infarcts. Otherwise, the gray-white matter interfaces are preserved. No acute territorial infarct, mass effect or midline shift. Redemonstration of cerebellar atrophy. HEMORRHAGE: No acute intracranial hemorrhage. VENTRICLES and EXTRA-AXIAL SPACES: Prominent, consistent with diffuse parenchymal volume loss. No extra-axial fluid collection. EXTRACRANIAL SOFT TISSUES: Within normal limits. CALVARIUM: No depressed calvarial fracture. PARANASAL SINUSES: No air-fluid levels. MASTOIDS: Within normal limits.   CERVICAL SPINE: ALIGNMENT: Within normal limits. VERTEBRAE: No acute fracture. Healed fractures at C1. DISCS: There is multilevel degenerative disc disease with prominent anterior osteophytosis. There is multilevel facet arthropathy. PREVERTEBRAL SOFT TISSUES: No prevertebral soft tissue swelling. LUNG APICES: No acute findings at the visualized lung apices. There is a 2.0 cm hypodense nodule at the right lobe of the thyroid gland       1.  No acute intracranial hemorrhage or depressed calvarial fracture. 2.  No acute fracture at the cervical spine. Degenerative changes. 3. 2.0 cm hypodense nodule at the right lobe of the thyroid gland. Nonemergent thyroid ultrasound can be obtained for further  evaluation.       MACRO: None.   Signed by: Kavita Fu 6/1/2025 1:46 AM Dictation workstation:   [id number]        Assessment/Plan  L hip periprosthetic fracture  - ROM of L hip deferred due to known injury. Injury closed, LLE NVI. Able to wiggle toes on LLE. LLE slightly shortened. 2+ LLE DP pulse palpated.  - NWB LLE  - Pain control as needed  - NPO  - Please obtain pre-op labs/imaging: CBC, BMP, coags, T&S, EKG and CXR  - Please hold DVT ppx    Patient discussed with on-call resident, Dr. Mckay. Would recommend transfer to [facility] for further treatment.     I spent 45 minutes in the professional and overall care of this patient.      JESSE Corley-CNP         [1] No family history on file.

## 2025-06-01 NOTE — DISCHARGE INSTRUCTIONS
Follow-Up Instructions  You will need to be seen in clinic by Dr Rosa in 2 week(s) for a post-operative evaluation.     You will need to call and schedule an appointment, unless there is a previous appointment that appears on your discharge instructions.  The direct orthopaedic clinic appointment line phone number is [phone].  Please do not delay in calling to make this appointment.    You should also follow up with your primary care provider in 1-2 weeks.    Activity Restrictions  1) No driving until further instructed by your orthopaedic physician, which will be addressed at your outpatient appointments.    2) No driving or operating heavy machinery while taking narcotic pain medication.    3) Weight bearing status --> Weight bearing as tolerated left leg, no active hip abduction.

## 2025-06-01 NOTE — H&P
History Of Present Illness  Levon Mckay is a 85 y.o. male with past medical history of CVA, hypertension, hyperlipidemia, stage IIIb CKD, prostate cancer, who is presenting [place] [facility] [place] with complaint of left hip pain after mechanical fall at home.  Patient stated that he was trying to sit on the barstool, but had awkward position, and fell to his left side landing on the left hip.  He denied hitting his head, but immediately felt pain, called his housemate, and tried to roll and presented himself and successfully pulled himself up to sit on the stool.  Patient stated that he was unable to bear weight on his left lower extremity, and his housemate called EMS who brought him to the hospital.  He admitted to history of left hip replacement, and currently undergoing prostate cancer treatment, which makes him weak, admit to chronic dizziness after CVA, and at baseline ambulates using a cane.  On review of system she admitted to pain on the left hip, chronic dizziness, and denies chest pain, shortness of breath, headache, altered vision, nausea, vomiting, fever, chills, change in bowel or urinary habit.  On evaluation in the ED CT abdomen and pelvis revealed Osteopenia. Status post left hip hemiarthroplasty. The previously described acute, nondisplaced, periprosthetic fracture at the  proximal left femur.  Orthopedics was consulted in the ED, upon evaluation, [place] [facility] [place] further review decision made to keep her at [facility] and proceed with surgery.      Past Medical History  Hypertension, CVA, hyperlipidemia, prostate cancer, stage IIIb CKD    Surgical History  Remote appendectomy, left hip surgery (in [place])     Social History  He reports that he has never smoked. He has never been exposed to tobacco smoke. He has never used smokeless tobacco. He reports that he does not currently use alcohol. He reports that he does not use drugs.    Family  History  Family History[1]     Allergies  Other    Review of Systems  A 10 point review of systems was conducted, with patient admitting to symptom as stated in HPI above, and deny having any other symptoms at that time.  Physical Exam   Constitutional: Pleasant gentleman, conversant without dyspnea, room air, alert active, cooperative not in acute distress  Eyes: PERRLA, clear sclera  ENMT: Moist mucosal membranes, no exudate  Head / Neck: Atraumatic, normocephalic, supple neck, JVP not visualized  Lungs: Patent airways, CTABL  Heart: RRR, S1S2, no murmurs appreciated, palpable pulses in all extremities  GI: Soft, NT, ND, bowel sounds present in all quadrants  MSK: Left lower extremity restriction  of ROM, no joint edema  Extremities: Tenderness with left lower extremity range of motion testing, mild bilateral lower extremities peripheral edema  : No Ascencio catheter inserted  Breast: Deferred  Neurological: AAO x 3 to person, place and date, facial muscles symmetrical, sensation intact, strength 4/4, no acute focal neurological deficits appreciated  Psychological: Appropriate mood and behavior    Last Recorded Vitals  Blood pressure 142/77, pulse 82, temperature 36.8 °C (98.2 °F), temperature source Oral, resp. rate 20, height 1.829 m (6'), weight 75.8 kg (167 lb), SpO2 (!) 90%.    Relevant Results             Assessment & Plan  Periprosthetic fracture of hip, subsequent encounter      85 y.o. male with past medical history of CVA, hypertension, hyperlipidemia, stage IIIb CKD, prostate cancer, who is presenting to [facility] ED with complaint of left hip pain after mechanical fall at home.    Periprosthetic fracture of the left hip: Secondary to mechanical fall at home  - CT abdomen and pelvis revealed Osteopenia. Status post left hip hemiarthroplasty. The previously described acute, nondisplaced, periprosthetic fracture at the proximal left femur.  Not seen on the x-ray of the pelvis  - Orthopedics  consulted, initially planned for transfer to [facility], now will proceed with surgery here at [facility]  - Pain management with Dilaudid 0.5 mg IV push every 4 hours as needed severe pain, Dilaudid 0.2 mg IV push every 4 hours as needed moderate pain.  -N.p.o.  -IVF with LR at 100 mL/h  - Discussed imaging and lab with patient, he is aware of orthopedics plan   - Heparin anticoagulation on hold as recommended by orthopedics  - Will need PT OT post operative management    Hypertension: Stable, after initial fluctuation on presentation with a low and high reading  - Amlodipine 7.5 mg daily  - Hold lisinopril given RAYMON    RAYMON or CKD 3B: Likely prerenal azotemia  - Hold lisinopril 20 mg daily  - IV fluid with  mL/h  - AM labs to [place]    History of CVA  - On aspirin 81 mg daily    Hyperlipidemia  - Continue atorvastatin 20 mg daily    Prostate cancer  - Undergoing therapy  - Continue Flomax 0.4 mg daily    Diet  - N.p.o.    DVT prophylaxis  - Heparin 5000 units subcu 3 times daily, perioperative on hold at this time    Disposition: Presenting with inability to walk, pain in the left hip, after mechanical fall, evaluation revealing periprosthetic fracture of the left hip, need further management, discharge pending clinical stability and final recommendation by Ortho    Anticipated length of stay greater than 2 midnights      I spent 60 minutes in the professional and overall care of this patient.      Anish Landa DO         [1] No family history on file.

## 2025-06-01 NOTE — CONSULTS
Orthopaedic Surgery Consult H&P    HPI:   Orthopaedic Problems/Injuries: Minimally displaced L [place] Ag fx  Other Injuries: None    85 y.o. male PMH CVA, HTN, Stage 3 CKD, prostate cancer with met to R ilium s/p therapy presents after mGLF (tried to sit and missed barstool) sustaining above. Ambulates with a cane at baseline, nonambulatory since his fall. Rents room with caregivers, so has 24/7 care available. Denies numbness, tingling, and open wounds on the affected limb.     PMH: per above/EMR  PSH: per above/EMR  SocHx:      -  Denies tobacco use      -  Denies EtOH use      -  Denies other drug use  FamHx:  Non-contributory to this patient's acute orthopaedic problem.   Allergies: Reviewed in EMR  Meds: Reviewed in EMR    ROS      - 14 point ROS negative except as above    Physical Exam:  Gen: AOx3, NAD  HEENT: normocephalic atraumatic  Psych: appropriate mood and affect  Resp: nonlabored breathing  Cardiac: Extremities WWP, RRR to peripheral palpation  Neuro: CN 2-12 grossly intact  Skin: no rashes    Left lower extremity:  - Skin intact   - Tender to palpation over L hip  - Fires EHL/DF/PF.  - Sensation intact to light touch in sural, saphenous, superficial/deep peroneal, tibial nerve distributions.  - 2+ DP pulse, < 2 seconds capillary refill.    A full secondary exam was performed and all relevant findings discussed and noted above.    Imaging:  AP and lateral radiographs of the L hip display cemented hemiarthroplasty; unable to identify fracture line.    CT CAP demonstrates Nondisplaced fracture about L hip hemiarthroplasty stem.    Assessment:  Orthopaedic Problems/Injuries: L minimally displaced [place] Ag fx     85 y.o. male PMH CVA, HTN, Stage 3 CKD, prostate cancer with met to R ilium s/p therapy presents after mGLF (tried to sit and missed barstool) sustaining above. Ambulates with a cane at baseline, nonambulatory since his fall. Rents room with caregivers, so has 24/7 care available. Closed,  NVI, isolated injury.     Plan:  - No acute ortho surgical intervention  - WB: WBAT LLE, no active hip abduction   - Abx: none indicated  - Diet: OK for regular  - DVT: DVT ppx per primary  -PT/OT consult    - Dispo: Follow up with Dr Rosa in 2 weeks     James Mckay MD  PGY-4 Orthopaedic Surgery  On-call Resident

## 2025-06-01 NOTE — PROGRESS NOTES
Patient presenting with abdominal pain, hip pain after fall.  Patient with diffusely tender abdomen.  At time of my assessment patient's blood pressure 107/58, he appears to be in no acute distress however given fall with left-sided abdominal tenderness left hip tenderness with soft blood pressures, do feel patient merits urgent CT imaging, patient does have a history of CKD however I feel that the theoretical risk of contrast-induced nephropathy is outweighed by benefit of urgent contrasted CT imaging to assess for acute traumatic injury.

## 2025-06-02 VITALS
TEMPERATURE: 99.2 F | OXYGEN SATURATION: 95 % | DIASTOLIC BLOOD PRESSURE: 73 MMHG | SYSTOLIC BLOOD PRESSURE: 138 MMHG | BODY MASS INDEX: 22.62 KG/M2 | RESPIRATION RATE: 19 BRPM | WEIGHT: 167 LBS | HEART RATE: 90 BPM | HEIGHT: 72 IN

## 2025-06-02 LAB
ANION GAP SERPL CALC-SCNC: 8 MMOL/L (ref 10–20)
ATRIAL RATE: 87 BPM
BUN SERPL-MCNC: 24 MG/DL (ref 6–23)
CALCIUM SERPL-MCNC: 7.7 MG/DL (ref 8.6–10.3)
CHLORIDE SERPL-SCNC: 110 MMOL/L (ref 98–107)
CO2 SERPL-SCNC: 26 MMOL/L (ref 21–32)
CREAT SERPL-MCNC: 1.86 MG/DL (ref 0.5–1.3)
EGFRCR SERPLBLD CKD-EPI 2021: 35 ML/MIN/1.73M*2
ERYTHROCYTE [DISTWIDTH] IN BLOOD BY AUTOMATED COUNT: 12.3 % (ref 11.5–14.5)
GLUCOSE SERPL-MCNC: 93 MG/DL (ref 74–99)
HCT VFR BLD AUTO: 34 % (ref 41–52)
HGB BLD-MCNC: 11.4 G/DL (ref 13.5–17.5)
MCH RBC QN AUTO: 33.3 PG (ref 26–34)
MCHC RBC AUTO-ENTMCNC: 33.5 G/DL (ref 32–36)
MCV RBC AUTO: 99 FL (ref 80–100)
NRBC BLD-RTO: 0 /100 WBCS (ref 0–0)
P AXIS: -9 DEGREES
P OFFSET: 195 MS
P ONSET: 145 MS
PLATELET # BLD AUTO: 96 X10*3/UL (ref 150–450)
POTASSIUM SERPL-SCNC: 3.8 MMOL/L (ref 3.5–5.3)
PR INTERVAL: 134 MS
Q ONSET: 212 MS
QRS COUNT: 14 BEATS
QRS DURATION: 102 MS
QT INTERVAL: 386 MS
QTC CALCULATION(BAZETT): 464 MS
QTC FREDERICIA: 436 MS
R AXIS: -8 DEGREES
RBC # BLD AUTO: 3.42 X10*6/UL (ref 4.5–5.9)
SODIUM SERPL-SCNC: 140 MMOL/L (ref 136–145)
T AXIS: -12 DEGREES
T OFFSET: 405 MS
VENTRICULAR RATE: 87 BPM
WBC # BLD AUTO: 7 X10*3/UL (ref 4.4–11.3)

## 2025-06-02 PROCEDURE — 2500000002 HC RX 250 W HCPCS SELF ADMINISTERED DRUGS (ALT 637 FOR MEDICARE OP, ALT 636 FOR OP/ED): Performed by: INTERNAL MEDICINE

## 2025-06-02 PROCEDURE — 36415 COLL VENOUS BLD VENIPUNCTURE: CPT | Performed by: INTERNAL MEDICINE

## 2025-06-02 PROCEDURE — 1200000002 HC GENERAL ROOM WITH TELEMETRY DAILY

## 2025-06-02 PROCEDURE — 97161 PT EVAL LOW COMPLEX 20 MIN: CPT | Mod: GP

## 2025-06-02 PROCEDURE — 99232 SBSQ HOSP IP/OBS MODERATE 35: CPT | Performed by: INTERNAL MEDICINE

## 2025-06-02 PROCEDURE — 2500000004 HC RX 250 GENERAL PHARMACY W/ HCPCS (ALT 636 FOR OP/ED): Performed by: INTERNAL MEDICINE

## 2025-06-02 PROCEDURE — 2500000001 HC RX 250 WO HCPCS SELF ADMINISTERED DRUGS (ALT 637 FOR MEDICARE OP): Performed by: INTERNAL MEDICINE

## 2025-06-02 PROCEDURE — 2500000004 HC RX 250 GENERAL PHARMACY W/ HCPCS (ALT 636 FOR OP/ED)

## 2025-06-02 PROCEDURE — 80048 BASIC METABOLIC PNL TOTAL CA: CPT | Performed by: INTERNAL MEDICINE

## 2025-06-02 PROCEDURE — 85027 COMPLETE CBC AUTOMATED: CPT | Performed by: INTERNAL MEDICINE

## 2025-06-02 RX ADMIN — AMLODIPINE BESYLATE 7.5 MG: 5 TABLET ORAL at 08:55

## 2025-06-02 RX ADMIN — OXYBUTYNIN CHLORIDE 5 MG: 5 TABLET ORAL at 20:46

## 2025-06-02 RX ADMIN — POLYETHYLENE GLYCOL 3350 17 G: 17 POWDER, FOR SOLUTION ORAL at 08:55

## 2025-06-02 RX ADMIN — ACETAMINOPHEN 650 MG: 325 TABLET, FILM COATED ORAL at 00:30

## 2025-06-02 RX ADMIN — HEPARIN SODIUM 5000 UNITS: 5000 INJECTION, SOLUTION INTRAVENOUS; SUBCUTANEOUS at 23:03

## 2025-06-02 RX ADMIN — OXYBUTYNIN CHLORIDE 5 MG: 5 TABLET ORAL at 08:55

## 2025-06-02 RX ADMIN — HEPARIN SODIUM 5000 UNITS: 5000 INJECTION, SOLUTION INTRAVENOUS; SUBCUTANEOUS at 16:19

## 2025-06-02 RX ADMIN — TAMSULOSIN HYDROCHLORIDE 0.4 MG: 0.4 CAPSULE ORAL at 08:55

## 2025-06-02 RX ADMIN — ASPIRIN 81 MG: 81 TABLET, COATED ORAL at 08:55

## 2025-06-02 RX ADMIN — SERTRALINE 100 MG: 50 TABLET, FILM COATED ORAL at 08:55

## 2025-06-02 RX ADMIN — ATORVASTATIN CALCIUM 20 MG: 20 TABLET, FILM COATED ORAL at 20:46

## 2025-06-02 RX ADMIN — PANTOPRAZOLE SODIUM 40 MG: 40 TABLET, DELAYED RELEASE ORAL at 06:08

## 2025-06-02 ASSESSMENT — COGNITIVE AND FUNCTIONAL STATUS - GENERAL
STANDING UP FROM CHAIR USING ARMS: A LOT
STANDING UP FROM CHAIR USING ARMS: A LITTLE
DAILY ACTIVITIY SCORE: 21
CLIMB 3 TO 5 STEPS WITH RAILING: A LOT
HELP NEEDED FOR BATHING: A LITTLE
WALKING IN HOSPITAL ROOM: A LOT
HELP NEEDED FOR BATHING: A LITTLE
WALKING IN HOSPITAL ROOM: A LITTLE
TOILETING: A LITTLE
STANDING UP FROM CHAIR USING ARMS: A LITTLE
DRESSING REGULAR LOWER BODY CLOTHING: A LITTLE
DRESSING REGULAR UPPER BODY CLOTHING: A LITTLE
MOVING FROM LYING ON BACK TO SITTING ON SIDE OF FLAT BED WITH BEDRAILS: A LITTLE
TOILETING: A LITTLE
PERSONAL GROOMING: A LITTLE
TURNING FROM BACK TO SIDE WHILE IN FLAT BAD: A LITTLE
CLIMB 3 TO 5 STEPS WITH RAILING: A LOT
DAILY ACTIVITIY SCORE: 19
MOBILITY SCORE: 18
TURNING FROM BACK TO SIDE WHILE IN FLAT BAD: A LITTLE
MOBILITY SCORE: 14
MOVING FROM LYING ON BACK TO SITTING ON SIDE OF FLAT BED WITH BEDRAILS: A LITTLE
MOVING TO AND FROM BED TO CHAIR: A LOT
MOVING TO AND FROM BED TO CHAIR: A LITTLE
DRESSING REGULAR LOWER BODY CLOTHING: A LITTLE
CLIMB 3 TO 5 STEPS WITH RAILING: A LITTLE
WALKING IN HOSPITAL ROOM: A LOT
MOBILITY SCORE: 18
MOVING TO AND FROM BED TO CHAIR: A LITTLE

## 2025-06-02 ASSESSMENT — PAIN SCALES - GENERAL
PAINLEVEL_OUTOF10: 0 - NO PAIN

## 2025-06-02 ASSESSMENT — ACTIVITIES OF DAILY LIVING (ADL)
ADL_ASSISTANCE: INDEPENDENT
LACK_OF_TRANSPORTATION: NO

## 2025-06-02 ASSESSMENT — PAIN - FUNCTIONAL ASSESSMENT
PAIN_FUNCTIONAL_ASSESSMENT: 0-10

## 2025-06-02 NOTE — PROGRESS NOTES
06/02/25 1223   ACS Disability Status   Are you deaf or do you have serious difficulty hearing? N   Are you blind or do you have serious difficulty seeing, even when wearing glasses? N   Because of a physical, mental, or emotional condition, do you have serious difficulty concentrating, remembering, or making decisions? (5 years old or older) Y  (stroke 20yrs ago)   Do you have serious difficulty walking or climbing stairs? Y  (has a chair lift)   Do you have serious difficulty dressing or bathing? N   Because of a physical, mental, or emotional condition, do you have serious difficulty doing errands alone such as visiting the doctor? Y  (Patient does not drive)

## 2025-06-02 NOTE — CARE PLAN
The patient's goals for the shift include      The clinical goals for the shift include Patient to remain free of fall for shift      Problem: Pain - Adult  Goal: Verbalizes/displays adequate comfort level or baseline comfort level  Outcome: Progressing     Problem: Safety - Adult  Goal: Free from fall injury  Outcome: Progressing     Problem: Discharge Planning  Goal: Discharge to home or other facility with appropriate resources  Outcome: Progressing     Problem: Chronic Conditions and Co-morbidities  Goal: Patient's chronic conditions and co-morbidity symptoms are monitored and maintained or improved  Outcome: Progressing     Problem: Nutrition  Goal: Nutrient intake appropriate for maintaining nutritional needs  Outcome: Progressing     Problem: Skin  Goal: Decreased wound size/increased tissue granulation at next dressing change  Outcome: Progressing  Goal: Participates in plan/prevention/treatment measures  Outcome: Progressing  Goal: Prevent/manage excess moisture  Outcome: Progressing  Goal: Prevent/minimize sheer/friction injuries  Outcome: Progressing  Goal: Promote/optimize nutrition  Outcome: Progressing  Goal: Promote skin healing  Outcome: Progressing

## 2025-06-02 NOTE — PROGRESS NOTES
Physical Therapy    Physical Therapy Evaluation    Patient Name: Levon Mckay  MRN: [id number]  Department: [place] [address]  Room: [address] [place] [address]  Today's Date: 6/2/2025   Time Calculation  Start Time: 0942  Stop Time: 1004  Time Calculation (min): 22 min    Assessment/Plan   PT Assessment  PT Assessment Results: Decreased strength, Decreased endurance, Impaired balance, Decreased mobility  Rehab Prognosis: Good  Barriers to Discharge Home: Caregiver assistance, Physical needs  Caregiver Assistance: Caregiver assistance needed per identified barriers - however, level of patient's required assistance exceeds assistance available at home  Physical Needs: Stair navigation into home limited by function/safety, In-home setup navigation limited by function/safety, Ambulating household distances limited by function/safety, 24hr mobility assistance needed, 24hr ADL assistance needed  Evaluation/Treatment Tolerance: Patient tolerated treatment well  Medical Staff Made Aware: Yes  Strengths: Ability to acquire knowledge, Insight into problems, Housing layout, Living arrangement secure, Support of Caregivers  Barriers to Participation: Comorbidities  End of Session Communication: Bedside nurse  Assessment Comment: Pt s/p L hip fracture (no surgical intervention and WBAT per orthopedic team) now demonstrating deficits in generalized strength, endurance and balance as well as increased pain resulting in impaired functional mobility, gait and self care. Due to the impairments listed above, pt would benefit from skilled physical therapy services in acute care setting as well as additional therapy in HIGH intensity therapy setting with 24/7 supervision and assistance. Pt appears to have potential to participate with >3 hours of therapy per day  End of Session Patient Position: Up in chair, Alarm on  IP OR SWING BED PT PLAN  Inpatient or Swing Bed: Inpatient  PT Plan  Treatment/Interventions: Bed mobility, Transfer training, Gait training, Stair  training, Balance training, Neuromuscular re-education, Strengthening, Endurance training, Therapeutic exercise, Therapeutic activity, Home exercise program  PT Plan: Ongoing PT  PT Frequency: 4 times per week  PT Discharge Recommendations: High intensity level of continued care  Equipment Recommended upon Discharge:  (TBD)  PT Recommended Transfer Status: Assist x2  PT - OK to Discharge: Yes (PT POC initiated this date)    Subjective     PT Visit Info:  PT Received On: 06/02/25  General Visit Information:  General  Reason for Referral: Pt is a 86 yo male presenting due to mechanical fall after missing barstool while attempting stand to sit transition. Pt presenting due to L hip pain and found to have displaced [place] Ag femoral greater trochanter fracture. Per orthopedic consultation assessment, no surgical intervention required and LLE WBAT  Referred By: Anish Landa DO  Past Medical History Relevant to Rehab: Medical History[1]  Prior to Session Communication: Bedside nurse  Patient Position Received: Bed, 3 rail up, Alarm on  General Comment: Pt pleasant and agreeable to PT evaluation. Pt limited by L hip pain with mobility and weightbearing. Pt motivated to regain PLOF  Home Living:  Home Living  Type of Home: House  Lives With:  (Caregivers: a  and wife (per T[facility] caregiver is pt's ex-wife))  Home Adaptive Equipment: Walker rolling or standard, Cane  Home Layout: Two level (Pt does not use 2nd floor of home. Pt utilizes first floor kitchen/living area as well as stays in basement for bedroom and full bathroom. Stair lift to basement)  Home Access: Stairs to enter with rails  Entrance Stairs-Rails: Right  Entrance Stairs-Number of Steps: ~6 WEI  Bathroom Shower/Tub: Walk-in shower  Bathroom Toilet: Handicapped height  Bathroom Equipment: Grab bars in shower, Shower chair without back, Raised toilet seat with rails  Prior Level of Function:  Prior Function Per Pt/Caregiver Report  Level of  [place]: Independent with ADLs and functional transfers, Independent with homemaking with ambulation  Receives Help From:  (Pt lives with a /wife who are his 2 caregivers)  ADL Assistance: Independent  Homemaking Assistance: Needs assistance (Pt reports does own laundry. Pt reports caregivers cook and he joins for dinner.  for cleaning)  Ambulatory Assistance: Needs assistance (Primarily SPC; reports caregivers have encouraged use of FWW recently)  Prior Function Comments: x1 fall is reason for admission; (-) driving (caregivers drive pt to appts)  Precautions:  Precautions  LE Weight Bearing Status: Weight Bearing as Tolerated (LLE)  Medical Precautions: Fall precautions      Date/Time Vitals Session Patient Position Pulse Resp SpO2 BP MAP (mmHg)    06/02/25 1126 --  --  78  18  94 %  130/77  --                 Objective   Pain:  Pain Assessment  Pain Assessment: 0-10  0-10 (Numeric) Pain Score: 0 - No pain (denies pain at rest. Pt reports pain with mobility and WB in L hip. FACES scale 6/10)  Pain Interventions: Repositioned, Ambulation/increased activity, Elevated  Cognition:  Cognition  Overall Cognitive Status: Within Functional Limits  Orientation Level: Oriented X4  Attention: Within Functional Limits  Memory: Within Funtional Limits  Insight: Within function limits  Impulsive: Within functional limits    General Assessments:  Activity Tolerance  Endurance: Tolerates 10 - 20 min exercise with multiple rests    Sensation  Light Touch: No apparent deficits    Coordination  Movements are Fluid and Coordinated: Yes    Static Sitting Balance  Static Sitting-Balance Support: No upper extremity supported, Feet supported  Static Sitting-Level of Assistance: Close supervision  Static Sitting-Comment/Number of Minutes: 2 min    Static Standing Balance  Static Standing-Balance Support: Bilateral upper extremity supported (FWW)  Static Standing-Level of Assistance: Contact guard  Static  Standing-Comment/Number of Minutes: 2 min  Functional Assessments:  Bed Mobility  Bed Mobility: Yes  Bed Mobility 1  Bed Mobility 1: Supine to sitting  Level of Assistance 1: Minimum assistance  Bed Mobility Comments 1: Assist with LLE. Pt limited by L hip pain    Transfers  Transfer: Yes  Transfer 1  Transfer From 1: Sit to  Transfer to 1: Stand  Technique 1: Sit to stand, Stand to sit  Transfer Device 1: Walker, Gait belt  Transfer Level of Assistance 1: Moderate assistance  Trials/Comments 1: VCs for sequencing. Assist to elevate into standing    Ambulation/Gait Training  Ambulation/Gait Training Performed: Yes  Ambulation/Gait Training 1  Surface 1: Level tile  Device 1: Rolling walker  Gait Support Devices: Gait belt  Assistance 1: Minimum assistance  Quality of Gait 1:  (L antalgic pattern with reduced L step length (increased time to initiate L swing phase). Assist for balance generally)  Comments/Distance (ft) 1: 6ft  Extremity/Trunk Assessments:  RLE   RLE : Within Functional Limits  LLE   LLE : Exceptions to WFL (Grossly limited at L hip due to pain after hip fracture (~2/5 MMT noted per functional mobility). L knee grossly 3+/5 and 5/5 at ankle)  Outcome Measures:  [facility] Basic Mobility  Turning from your back to your side while in a flat bed without using bedrails: A little  Moving from lying on your back to sitting on the side of a flat bed without using bedrails: A little  Moving to and from bed to chair (including a wheelchair): A lot  Standing up from a chair using your arms (e.g. wheelchair or bedside chair): A lot  To walk in hospital room: A lot  Climbing 3-5 steps with railing: A lot  Basic Mobility - Total Score: 14    Encounter Problems       Encounter Problems (Active)       Mobility       LTG - Patient will navigate 4-6 steps with rails/device       Start:  06/02/25    Expected End:  06/16/25       CGA using HR          STG - Patient will ambulate       Start:  06/02/25    Expected End:   06/16/25       SBA using FWW >30ft            Mobility       HEP       Start:  06/02/25    Expected End:  06/16/25       Pt will complete L hip fracture HEP with SUP (handout not provided during evaluation - please provide)            PT Transfers       STG - Patient will perform bed mobility       Start:  06/02/25    Expected End:  06/16/25       Mod Ind         STG - Patient will transfer sit to and from stand       Start:  06/02/25    Expected End:  06/16/25       SUP            Pain - Adult              Education Documentation  Precautions, taught by Zaki Pandya, PT at 6/2/2025 11:53 AM.  Learner: Patient  Readiness: Acceptance  Method: Explanation  Response: Verbalizes Understanding  Comment: see above    Body Mechanics, taught by Zaki Pandya, PT at 6/2/2025 11:53 AM.  Learner: Patient  Readiness: Acceptance  Method: Explanation  Response: Verbalizes Understanding  Comment: see above    Home Exercise Program, taught by Zaki Pandya, PT at 6/2/2025 11:53 AM.  Learner: Patient  Readiness: Acceptance  Method: Explanation  Response: Verbalizes Understanding  Comment: see above    Mobility Training, taught by Zaki Pandya, PT at 6/2/2025 11:53 AM.  Learner: Patient  Readiness: Acceptance  Method: Explanation  Response: Verbalizes Understanding  Comment: see above    Education Comments  No comments found.                 [1]   Past Medical History:  Diagnosis Date    Prostate cancer (Multi)     Stroke (Multi)

## 2025-06-02 NOTE — PROGRESS NOTES
06/02/25 1225   Discharge Planning   Living Arrangements Other (Comment)  (Lives with his ex-wife and her )   Support Systems Friends/neighbors;Family members   Assistance Needed Patient uses a cane or FWW.  Caregiver does the driving   Type of Residence Private residence   Number of Stairs to Enter Residence 6   Number of Stairs Within Residence 0  (Has own living space in basement with a stair lift)   Do you have animals or pets at home? No   Who is requesting discharge planning? Provider   Home or Post Acute Services Post acute facilities (Rehab/SNF/etc)   Expected Discharge Disposition SNF   Financial Resource Strain   How hard is it for you to pay for the very basics like food, housing, medical care, and heating? Not hard   Housing Stability   In the last 12 months, was there a time when you were not able to pay the mortgage or rent on time? N   In the past 12 months, how many times have you moved where you were living? 0   At any time in the past 12 months, were you homeless or living in a shelter (including now)? N   Transportation Needs   In the past 12 months, has lack of transportation kept you from medical appointments or from getting medications? no   In the past 12 months, has lack of transportation kept you from meetings, work, or from getting things needed for daily living? No   Patient Choice   Provider Choice list and CMS website (https://medicare.gov/care-compare#search) for post-acute Quality and Resource Measure Data were provided and reviewed with: Patient   Patient / Family choosing to utilize agency / facility established prior to hospitalization No   Stroke Family Assessment   Stroke Family Assessment Needed No   Intensity of Service   Intensity of Service 0-30 min     Met with patient at the bedside to discuss discharge plan.  Reviewed therapy recommendations and he would like to go over a SNF list with Destiny, his ex-wife.  His ex-wifes spouse does the driving for the  patient.  Provided patient with a SNF list based on his insurance, zip code, and star rating.  He will need 3 inpatient midnights for SNF qualification. (6/1-6/3)  PLAN/BARRIER: PT, OT  DISP: SNF  ADOD: 2-3 days  TCC and/or SW to follow for SNF choices.  Davina Sanches RN     6/2/25 @ 4820  Met with [place], patient's caregiver and the patient.  They would like a referral sent to [facility].  Requested [facility] send referral.  Davina Sanches RN

## 2025-06-03 ENCOUNTER — APPOINTMENT (OUTPATIENT)
Dept: HEMATOLOGY/ONCOLOGY | Facility: HOSPITAL | Age: 85
End: 2025-06-03
Payer: MEDICARE

## 2025-06-03 VITALS
WEIGHT: 167 LBS | BODY MASS INDEX: 22.62 KG/M2 | HEIGHT: 72 IN | TEMPERATURE: 98.5 F | SYSTOLIC BLOOD PRESSURE: 136 MMHG | OXYGEN SATURATION: 93 % | HEART RATE: 83 BPM | RESPIRATION RATE: 18 BRPM | DIASTOLIC BLOOD PRESSURE: 75 MMHG

## 2025-06-03 LAB
ANION GAP SERPL CALC-SCNC: 9 MMOL/L (ref 10–20)
BUN SERPL-MCNC: 22 MG/DL (ref 6–23)
CALCIUM SERPL-MCNC: 7.7 MG/DL (ref 8.6–10.3)
CHLORIDE SERPL-SCNC: 110 MMOL/L (ref 98–107)
CO2 SERPL-SCNC: 25 MMOL/L (ref 21–32)
CREAT SERPL-MCNC: 1.93 MG/DL (ref 0.5–1.3)
EGFRCR SERPLBLD CKD-EPI 2021: 34 ML/MIN/1.73M*2
ERYTHROCYTE [DISTWIDTH] IN BLOOD BY AUTOMATED COUNT: 12.1 % (ref 11.5–14.5)
GLUCOSE SERPL-MCNC: 90 MG/DL (ref 74–99)
HCT VFR BLD AUTO: 32.4 % (ref 41–52)
HGB BLD-MCNC: 11.2 G/DL (ref 13.5–17.5)
MCH RBC QN AUTO: 33.5 PG (ref 26–34)
MCHC RBC AUTO-ENTMCNC: 34.6 G/DL (ref 32–36)
MCV RBC AUTO: 97 FL (ref 80–100)
NRBC BLD-RTO: 0 /100 WBCS (ref 0–0)
PLATELET # BLD AUTO: 83 X10*3/UL (ref 150–450)
POTASSIUM SERPL-SCNC: 4 MMOL/L (ref 3.5–5.3)
RBC # BLD AUTO: 3.34 X10*6/UL (ref 4.5–5.9)
SODIUM SERPL-SCNC: 140 MMOL/L (ref 136–145)
WBC # BLD AUTO: 5.9 X10*3/UL (ref 4.4–11.3)

## 2025-06-03 PROCEDURE — 2500000002 HC RX 250 W HCPCS SELF ADMINISTERED DRUGS (ALT 637 FOR MEDICARE OP, ALT 636 FOR OP/ED): Performed by: INTERNAL MEDICINE

## 2025-06-03 PROCEDURE — 80048 BASIC METABOLIC PNL TOTAL CA: CPT | Performed by: INTERNAL MEDICINE

## 2025-06-03 PROCEDURE — 85027 COMPLETE CBC AUTOMATED: CPT | Performed by: INTERNAL MEDICINE

## 2025-06-03 PROCEDURE — 97165 OT EVAL LOW COMPLEX 30 MIN: CPT | Mod: GO

## 2025-06-03 PROCEDURE — 97110 THERAPEUTIC EXERCISES: CPT | Mod: GP,CQ

## 2025-06-03 PROCEDURE — 97530 THERAPEUTIC ACTIVITIES: CPT | Mod: GP,CQ

## 2025-06-03 PROCEDURE — 97535 SELF CARE MNGMENT TRAINING: CPT | Mod: GO

## 2025-06-03 PROCEDURE — 99239 HOSP IP/OBS DSCHRG MGMT >30: CPT | Performed by: STUDENT IN AN ORGANIZED HEALTH CARE EDUCATION/TRAINING PROGRAM

## 2025-06-03 PROCEDURE — 1200000002 HC GENERAL ROOM WITH TELEMETRY DAILY

## 2025-06-03 PROCEDURE — 2500000004 HC RX 250 GENERAL PHARMACY W/ HCPCS (ALT 636 FOR OP/ED): Performed by: INTERNAL MEDICINE

## 2025-06-03 PROCEDURE — 36415 COLL VENOUS BLD VENIPUNCTURE: CPT | Performed by: INTERNAL MEDICINE

## 2025-06-03 PROCEDURE — 2500000004 HC RX 250 GENERAL PHARMACY W/ HCPCS (ALT 636 FOR OP/ED)

## 2025-06-03 PROCEDURE — 2500000001 HC RX 250 WO HCPCS SELF ADMINISTERED DRUGS (ALT 637 FOR MEDICARE OP): Performed by: INTERNAL MEDICINE

## 2025-06-03 PROCEDURE — 97116 GAIT TRAINING THERAPY: CPT | Mod: GP,CQ

## 2025-06-03 RX ORDER — OXYCODONE HYDROCHLORIDE 5 MG/1
5 TABLET ORAL EVERY 6 HOURS PRN
Refills: 0 | Status: DISCONTINUED | OUTPATIENT
Start: 2025-06-03 | End: 2025-06-04 | Stop reason: HOSPADM

## 2025-06-03 RX ORDER — ACETAMINOPHEN 325 MG/1
650 TABLET ORAL EVERY 4 HOURS PRN
Start: 2025-06-03

## 2025-06-03 RX ORDER — OXYCODONE HYDROCHLORIDE 5 MG/1
10 TABLET ORAL EVERY 6 HOURS PRN
Refills: 0 | Status: DISCONTINUED | OUTPATIENT
Start: 2025-06-03 | End: 2025-06-04 | Stop reason: HOSPADM

## 2025-06-03 RX ADMIN — ATORVASTATIN CALCIUM 20 MG: 20 TABLET, FILM COATED ORAL at 20:53

## 2025-06-03 RX ADMIN — AMLODIPINE BESYLATE 7.5 MG: 5 TABLET ORAL at 08:38

## 2025-06-03 RX ADMIN — OXYBUTYNIN CHLORIDE 5 MG: 5 TABLET ORAL at 08:39

## 2025-06-03 RX ADMIN — PANTOPRAZOLE SODIUM 40 MG: 40 TABLET, DELAYED RELEASE ORAL at 06:10

## 2025-06-03 RX ADMIN — TAMSULOSIN HYDROCHLORIDE 0.4 MG: 0.4 CAPSULE ORAL at 08:38

## 2025-06-03 RX ADMIN — HEPARIN SODIUM 5000 UNITS: 5000 INJECTION, SOLUTION INTRAVENOUS; SUBCUTANEOUS at 08:38

## 2025-06-03 RX ADMIN — ASPIRIN 81 MG: 81 TABLET, COATED ORAL at 08:38

## 2025-06-03 RX ADMIN — HEPARIN SODIUM 5000 UNITS: 5000 INJECTION, SOLUTION INTRAVENOUS; SUBCUTANEOUS at 16:00

## 2025-06-03 RX ADMIN — OXYBUTYNIN CHLORIDE 5 MG: 5 TABLET ORAL at 20:53

## 2025-06-03 RX ADMIN — POLYETHYLENE GLYCOL 3350 17 G: 17 POWDER, FOR SOLUTION ORAL at 08:38

## 2025-06-03 RX ADMIN — SERTRALINE 100 MG: 50 TABLET, FILM COATED ORAL at 08:38

## 2025-06-03 ASSESSMENT — COGNITIVE AND FUNCTIONAL STATUS - GENERAL
MOVING FROM LYING ON BACK TO SITTING ON SIDE OF FLAT BED WITH BEDRAILS: A LITTLE
TURNING FROM BACK TO SIDE WHILE IN FLAT BAD: A LITTLE
CLIMB 3 TO 5 STEPS WITH RAILING: TOTAL
DRESSING REGULAR UPPER BODY CLOTHING: A LITTLE
MOBILITY SCORE: 16
HELP NEEDED FOR BATHING: A LITTLE
DRESSING REGULAR LOWER BODY CLOTHING: A LOT
PERSONAL GROOMING: A LITTLE
WALKING IN HOSPITAL ROOM: A LITTLE
STANDING UP FROM CHAIR USING ARMS: A LITTLE
MOVING TO AND FROM BED TO CHAIR: A LITTLE
HELP NEEDED FOR BATHING: A LITTLE
WALKING IN HOSPITAL ROOM: A LITTLE
MOBILITY SCORE: 18
MOVING FROM LYING ON BACK TO SITTING ON SIDE OF FLAT BED WITH BEDRAILS: A LITTLE
STANDING UP FROM CHAIR USING ARMS: A LITTLE
TURNING FROM BACK TO SIDE WHILE IN FLAT BAD: A LITTLE
CLIMB 3 TO 5 STEPS WITH RAILING: A LITTLE
TOILETING: A LITTLE
DAILY ACTIVITIY SCORE: 18
TOILETING: A LITTLE
DRESSING REGULAR UPPER BODY CLOTHING: A LITTLE
PERSONAL GROOMING: A LITTLE
DRESSING REGULAR LOWER BODY CLOTHING: A LITTLE
DAILY ACTIVITIY SCORE: 19
MOVING TO AND FROM BED TO CHAIR: A LITTLE

## 2025-06-03 ASSESSMENT — PAIN SCALES - GENERAL
PAINLEVEL_OUTOF10: 0 - NO PAIN

## 2025-06-03 ASSESSMENT — PAIN - FUNCTIONAL ASSESSMENT
PAIN_FUNCTIONAL_ASSESSMENT: 0-10

## 2025-06-03 ASSESSMENT — ACTIVITIES OF DAILY LIVING (ADL)
ADL_ASSISTANCE: INDEPENDENT
HOME_MANAGEMENT_TIME_ENTRY: 23
BATHING_ASSISTANCE: MINIMAL
BATHING_LEVEL_OF_ASSISTANCE: MINIMUM ASSISTANCE;MODERATE ASSISTANCE

## 2025-06-03 NOTE — PROGRESS NOTES
06/03/25 1507   Discharge Planning   Home or Post Acute Services Post acute facilities (Rehab/SNF/etc)   Type of Post Acute Facility Services Skilled nursing   Expected Discharge Disposition SNF   Does the patient need discharge transport arranged? Yes   RoundTrip coordination needed? Yes   Has discharge transport been arranged? No   What day is the transport expected? 06/03/25     Patient is medically cleared for discharge.  [facility] can accept patient today.  Report to be called to [phone]  Requested [facility] set up transport for 1630  SW to notify emergency contact.  Patient aware of discharge today.  Secure chat to bedside nurse, charge nurse, division , an nurse manager to inform of discharge.  Davina Sanches RN

## 2025-06-03 NOTE — PROGRESS NOTES
6/3/2025 11:42 Am  Destiny requests referrals to [facility]  and  Acute Rehab. Li VENTURA

## 2025-06-03 NOTE — PROGRESS NOTES
Levon Mckay is a 85 y.o. male on day 1 of admission presenting with Periprosthetic fracture of hip, subsequent encounter.      Subjective   Patient was seen and examined bedside this morning, stated that he is still feeling pain in his left hip, over impact after his fall.  Stated that he is looking forward to rehab.       Objective     Last Recorded Vitals  /77   Pulse 87   Temp 36.8 °C (98.3 °F)   Resp 18   Wt 75.8 kg (167 lb)   SpO2 93%   Intake/Output last 3 Shifts:    Intake/Output Summary (Last 24 hours) at 6/2/2025 2215  Last data filed at 6/2/2025 1614  Gross per 24 hour   Intake 1240 ml   Output 625 ml   Net 615 ml       Admission Weight  Weight: 75.8 kg (167 lb) (05/31/25 2344)    Daily Weight  05/31/25 : 75.8 kg (167 lb)    Image Results  ECG 12 lead  Normal sinus rhythm  Possible Lateral infarct (cited on or before 19-JAN-2022)  Poor R-wave progression ; consider septal infarct, lead placement, or normal variant  Abnormal ECG  When compared with ECG of 19-JAN-2022 17:01,  QRS duration has increased  Questionable change in initial forces of Lateral leads  Non-specific change in ST segment in Lateral leads  T wave inversion now evident in Inferior leads  Nonspecific T wave abnormality now evident in Lateral leads      Physical Exam  Constitutional: Pleasant gentleman, conversant without dyspnea, room air, alert active, cooperative not in acute distress  Eyes: PERRLA, clear sclera  ENMT: Moist mucosal membranes, no exudate  Head / Neck: Atraumatic, normocephalic, supple neck, JVP not visualized  Lungs: Patent airways, CTABL  Heart: RRR, S1S2, no murmurs appreciated, palpable pulses in all extremities  GI: Soft, NT, ND, bowel sounds present in all quadrants  MSK: Left lower extremity restriction  of ROM, no joint edema  Extremities: Tenderness with left lower extremity range of motion testing, mild bilateral lower extremities peripheral edema  : No Ascencio catheter inserted  Breast:  Deferred  Neurological: AAO x 3 to person, place and date, facial muscles symmetrical, sensation intact, strength 4/4, no acute focal neurological deficits appreciated  Psychological: Appropriate mood and behavior  Relevant Results                              Assessment & Plan  Periprosthetic fracture of hip, subsequent encounter    85 y.o. male with past medical history of CVA, hypertension, hyperlipidemia, stage IIIb CKD, prostate cancer, who is presenting to [facility] ED with complaint of left hip pain after mechanical fall at home.     Periprosthetic fracture of the left hip: Secondary to mechanical fall at home  - CT abdomen and pelvis revealed Osteopenia. Status post left hip hemiarthroplasty. The previously described acute, nondisplaced, periprosthetic fracture at the proximal left femur.  Not seen on the x-ray of the pelvis  - Orthopedics consulted, initially planned for transfer to [facility], now will proceed with surgery here at [facility]. No acute ortho surgical intervention. WBAT LLE, no active hip abduction. Abx: none indicated  - Pain management with Dilaudid 0.5 mg IV push every 4 hours as needed severe pain, Dilaudid 0.2 mg IV push every 4 hours as needed moderate pain.  -N.p.o.  -IVF with LR at 100 mL/h  - Discussed imaging and lab with patient, he is aware of orthopedics plan   - Heparin anticoagulation on hold as recommended by orthopedics  - Will need PT OT post operative management: Recommend high intensity level of continued care     Hypertension: Stable, after initial fluctuation on presentation with a low and high reading  - Amlodipine 7.5 mg daily  - Hold lisinopril given RAYMON     RAYMON or CKD 3B: Likely prerenal azotemia  - Hold lisinopril 20 mg daily  - IV fluid with  mL/h  - AM labs to [place]     History of CVA  - On aspirin 81 mg daily     Hyperlipidemia  - Continue atorvastatin 20 mg daily     Prostate cancer  - Undergoing therapy  - Continue Flomax 0.4 mg daily     Diet  -  N.p.o.     DVT prophylaxis  - Heparin 5000 units subcu 3 times daily, perioperative on hold at this time     Disposition: Presenting with inability to walk, pain in the left hip, after mechanical fall, evaluation revealing periprosthetic fracture of the left hip, need further management, discharge pending placement to SNF           Anish Landa DO

## 2025-06-03 NOTE — DISCHARGE SUMMARY
Discharge Diagnosis  Periprosthetic fracture of hip, subsequent encounter           Issues Requiring Follow-Up  Post hospital follow up    Discharge Meds     Medication List      START taking these medications     acetaminophen 325 mg tablet; Commonly known as: Tylenol; Take 2 tablets   (650 mg) by mouth every 4 hours if needed for moderate pain (4 - 6).     CONTINUE taking these medications     amLODIPine 5 mg tablet; Commonly known as: Norvasc; Take 1.5 tablets   (7.5 mg) by mouth once daily.   aspirin 81 mg capsule   atorvastatin 20 mg tablet; Commonly known as: Lipitor; TAKE 1 TABLET BY   MOUTH ONCE  DAILY AS DIRECTED   sertraline 100 mg tablet; Commonly known as: Zoloft; TAKE 1 TABLET BY   MOUTH DAILY   tamsulosin 0.4 mg 24 hr capsule; Commonly known as: Flomax; Take 1   capsule (0.4 mg) by mouth once daily.   trospium 20 mg tablet; Commonly known as: [place]; Take 1 tablet (20   mg) by mouth once daily.     STOP taking these medications     lisinopril 20 mg tablet   omeprazole 40 mg DR capsule; Commonly known as: PriLOSEC       Test Results Pending At Discharge  Pending Labs       Order Current Status    Extra Urine Gray Tube Collected (06/01/25 8209)    Urinalysis with Reflex Culture and Microscopic In process            Hospital Course   85 y.o. male with past medical history of CVA, hypertension, hyperlipidemia, stage IIIb CKD, prostate cancer, who is presenting [facility] ED with complaint of left hip pain after mechanical fall at home. He is treated for     Periprosthetic fracture of the left hip 2/2 mechanical fall   - CT abdomen and pelvis revealed Osteopenia. Status post left hip hemiarthroplasty. The previously described acute, nondisplaced, periprosthetic fracture at the proximal left femur.  - Orthopedics recs appreciated: conservative management  - oxycodone PRN  - awaiting SNF placement     HTN  - Amlodipine 7.5 mg daily  - Hold lisinopril given RAYMON     RAYMON or CKD 3B: Likely  prerenal azotemia  - Hold lisinopril 20 mg daily  - cr now stable likely at new baseline     Normocytic anemia  - monitor     Chronic thrombocytopenia  - monitor     History of CVA  - On aspirin 81 mg daily     Hyperlipidemia  - atorvastatin 20 mg daily  - ASA     Prostate cancer  - Undergoing therapy  - Continue Flomax 0.4 mg daily     GERD  - ppi     Depression/anxiety  - zoloft     Pt is hemodynamically stable for discharge at this time to  acute rehab with close outpatient follow ups.     The patient was discharged in satisfactory condition.   Medications and side effect profile reviewed with patient.  More than 30 minutes were spent in coordinating patient discharge       Pertinent Physical Exam At Time of Discharge  Physical Exam  Vitals and nursing note reviewed.   Constitutional:       General: He is not in acute distress.     Appearance: Normal appearance. He is not ill-appearing or toxic-appearing.   HENT:      Head: Normocephalic and atraumatic.      Nose: Nose normal.      Mouth/Throat:      Mouth: Mucous membranes are moist.   Eyes:      Extraocular Movements: Extraocular movements intact.      Conjunctiva/sclera: Conjunctivae normal.      Pupils: Pupils are equal, round, and reactive to light.   Cardiovascular:      Rate and Rhythm: Normal rate and regular rhythm.      Heart sounds: No murmur heard.     No gallop.   Pulmonary:      Effort: Pulmonary effort is normal. No respiratory distress.      Breath sounds: Normal breath sounds. No wheezing, rhonchi or rales.   Abdominal:      General: Abdomen is flat. Bowel sounds are normal. There is no distension.      Palpations: Abdomen is soft. There is no mass.      Tenderness: There is no abdominal tenderness.   Musculoskeletal:         General: Tenderness present. No swelling. Normal range of motion.      Cervical back: Normal range of motion and neck supple.   Skin:     General: Skin is warm and dry.   Neurological:      Mental Status: He is alert and  oriented to person, place, and time.      Motor: Weakness present.   Psychiatric:         Mood and Affect: Mood normal.         Behavior: Behavior normal.         Thought Content: Thought content normal.         Judgment: Judgment normal.        Outpatient Follow-Up  Future Appointments   Date Time Provider Department [place]   7/7/2025  9:30 AM Raymon Tellez MD [id number] [facility]   7/23/2025  2:00 PM Ilir Bear MD [id number] [facility]   11/7/2025 10:00 AM JESSE Cortze-CNP [id number] [facility]   12/9/2025 10:15 AM Zina Terry MD [id number] None   3/30/2026 11:00 AM Rafaela Rodriguez [id number] [facility]   3/30/2026 11:30 AM Ferny Guadalupe MD [id number] [facility]         Evelyn Kapadia MD

## 2025-06-03 NOTE — PROGRESS NOTES
Levon Mckay is a 85 y.o. male on day 2 of admission presenting with Periprosthetic fracture of hip, subsequent encounter.    Subjective   Pt denies pain. No overnight events.        Objective     Physical Exam  Vitals and nursing note reviewed.   Constitutional:       General: He is not in acute distress.     Appearance: Normal appearance. He is not ill-appearing or toxic-appearing.   HENT:      Head: Normocephalic and atraumatic.      Nose: Nose normal.      Mouth/Throat:      Mouth: Mucous membranes are moist.   Eyes:      Extraocular Movements: Extraocular movements intact.      Conjunctiva/sclera: Conjunctivae normal.      Pupils: Pupils are equal, round, and reactive to light.   Cardiovascular:      Rate and Rhythm: Normal rate and regular rhythm.      Heart sounds: No murmur heard.     No gallop.   Pulmonary:      Effort: Pulmonary effort is normal. No respiratory distress.      Breath sounds: Normal breath sounds. No wheezing, rhonchi or rales.   Abdominal:      General: Abdomen is flat. Bowel sounds are normal. There is no distension.      Palpations: Abdomen is soft. There is no mass.      Tenderness: There is no abdominal tenderness.   Musculoskeletal:         General: Tenderness present. No swelling. Normal range of motion.      Cervical back: Normal range of motion and neck supple.   Skin:     General: Skin is warm and dry.   Neurological:      Mental Status: He is alert and oriented to person, place, and time.      Motor: Weakness present.   Psychiatric:         Mood and Affect: Mood normal.         Behavior: Behavior normal.         Thought Content: Thought content normal.         Judgment: Judgment normal.         Last Recorded Vitals:  /74   Pulse 77   Temp 37.4 °C (99.3 °F) (Temporal)   Resp 16   Ht 1.829 m (6')   Wt 75.8 kg (167 lb)   SpO2 93%   BMI 22.65 kg/m²      Scheduled medications:  Scheduled Medications[1]  Continuous medications:  Continuous Medications[2]  PRN  medications:  PRN Medications[3]     Relevant Results:  Results for orders placed or performed during the hospital encounter of 05/31/25 (from the past 24 hours)   CBC   Result Value Ref Range    WBC 5.9 4.4 - 11.3 x10*3/uL    nRBC 0.0 0.0 - 0.0 /100 WBCs    RBC 3.34 (L) 4.50 - 5.90 x10*6/uL    Hemoglobin 11.2 (L) 13.5 - 17.5 g/dL    Hematocrit 32.4 (L) 41.0 - 52.0 %    MCV 97 80 - 100 fL    MCH 33.5 26.0 - 34.0 pg    MCHC 34.6 32.0 - 36.0 g/dL    RDW 12.1 11.5 - 14.5 %    Platelets 83 (L) 150 - 450 x10*3/uL   Basic Metabolic Panel   Result Value Ref Range    Glucose 90 74 - 99 mg/dL    Sodium 140 136 - 145 mmol/L    Potassium 4.0 3.5 - 5.3 mmol/L    Chloride 110 (H) 98 - 107 mmol/L    Bicarbonate 25 21 - 32 mmol/L    Anion Gap 9 (L) 10 - 20 mmol/L    Urea Nitrogen 22 6 - 23 mg/dL    Creatinine 1.93 (H) 0.50 - 1.30 mg/dL    eGFR 34 (L) >60 mL/min/1.73m*2    Calcium 7.7 (L) 8.6 - 10.3 mg/dL       Imaging  CT hip left wo IV contrast  Result Date: 6/1/2025  Nondisplaced periprosthetic fracture adjacent to the femoral component of the left hip hemiarthroplasty.     MACRO: None   Signed by: Vazquez Hernandez 6/1/2025 4:16 PM Dictation workstation:   [id number]    XR hip left with pelvis when performed 2 or 3 views  Result Date: 6/1/2025  Nondisplaced proximal anterior left femoral periprosthetic fracture.   MACRO: None   Signed by: Neo Godinez 6/1/2025 2:52 PM Dictation workstation:   [id number]    XR femur left 2+ views  Result Date: 6/1/2025  Nondisplaced proximal anterior left femoral periprosthetic fracture.   MACRO: None   Signed by: Neo Godinez 6/1/2025 2:52 PM Dictation workstation:   [id number]      Cardiology, Vascular, and Other Imaging  No other imaging results found for the past 2 days                     Assessment/Plan   Assessment & Plan  Periprosthetic fracture of hip, subsequent encounter    85 y.o. male with past medical history of CVA, hypertension, hyperlipidemia, stage IIIb CKD, prostate cancer, who  is presenting to [facility] ED with complaint of left hip pain after mechanical fall at home.     Periprosthetic fracture of the left hip 2/2 mechanical fall   - CT abdomen and pelvis revealed Osteopenia. Status post left hip hemiarthroplasty. The previously described acute, nondisplaced, periprosthetic fracture at the proximal left femur.  - Orthopedics recs appreciated: conservative management  - oxycodone PRN  - awaiting SNF placement     HTN  - Amlodipine 7.5 mg daily  - Hold lisinopril given RAYMON     RAYMON or CKD 3B: Likely prerenal azotemia  - Hold lisinopril 20 mg daily  - cr now stable likely at new baseline     Normocytic anemia  - monitor    Chronic thrombocytopenia  - monitor    History of CVA  - On aspirin 81 mg daily     Hyperlipidemia  - atorvastatin 20 mg daily  - ASA    Prostate cancer  - Undergoing therapy  - Continue Flomax 0.4 mg daily    GERD  - ppi     Depression/anxiety  - zoloft    DVT prophylaxis: SQ hep  Dispo: monitor clinically          Evelyn Kapadia MD  Hospitalist         [1] amLODIPine, 7.5 mg, oral, Daily  aspirin, 81 mg, oral, Daily  atorvastatin, 20 mg, oral, Nightly  heparin (porcine), 5,000 Units, subcutaneous, q8h  [Held by provider] lisinopril, 20 mg, oral, Daily  oxyBUTYnin, 5 mg, oral, BID  pantoprazole, 40 mg, oral, Daily before breakfast   Or  pantoprazole, 40 mg, intravenous, Daily before breakfast  polyethylene glycol, 17 g, oral, Daily  sertraline, 100 mg, oral, Daily  tamsulosin, 0.4 mg, oral, Daily  [2]    [3] PRN medications: acetaminophen **OR** acetaminophen **OR** acetaminophen, HYDROmorphone, HYDROmorphone, ondansetron **OR** ondansetron

## 2025-06-03 NOTE — PROGRESS NOTES
Occupational Therapy  Evaluation/Treatment    Patient Name: Levon Mckay  MRN: [id number]  : 1940  Today's Date: 25  Time Calculation  Start Time: 1035  Stop Time: 1115  Time Calculation (min): 40 min       Assessment:  OT Assessment: Pt demonstrates high risk for falls, generalized deconditioning and deficits with ADLs. OT to address transfer training, balance training, ADL training and compensatory training, d/c planning. Recommend d/c to High Intensity to optimize and promote independence with functional tasks.  Prognosis: Good  Barriers to Discharge Home: Physical needs  Physical Needs: 24hr mobility assistance needed, Intermittent ADL assistance needed, High falls risk due to function or environment  Evaluation/Treatment Tolerance: Patient tolerated treatment well  Medical Staff Made Aware: Yes  End of Session Communication: Bedside nurse (Via secure chat)  End of Session Patient Position: Up in chair, Alarm on  OT Assessment Results: Decreased ADL status, Decreased safe judgment during ADL, Decreased endurance, Decreased functional mobility  Prognosis: Good  Evaluation/Treatment Tolerance: Patient tolerated treatment well  Medical Staff Made Aware: Yes  Strengths: Premorbid level of function, Rehab experience  Barriers to Participation: Insight into problems  Plan:  Treatment Interventions: ADL retraining, Functional transfer training, Patient/family training, Equipment evaluation/education, Compensatory technique education  OT Frequency: 3 times per week  OT Discharge Recommendations: High intensity level of continued care  OT Recommended Transfer Status: Minimal assist, Assist of 1  OT - OK to Discharge: Yes  Treatment Interventions: ADL retraining, Functional transfer training, Patient/family training, Equipment evaluation/education, Compensatory technique education    Subjective   Current Problem:  1. Periprosthetic fracture of hip, subsequent encounter          General:   OT Received On:  06/03/25  General  Reason for Referral: Pt is a 86 yo male presenting due to mechanical fall after missing barstool while attempting stand to sit transition. Pt presenting due to L hip pain and found to have displaced [place] femoral greater trochanter fracture. Per orthopedic consultation assessment, no surgical intervention required and LLE WBAT  Referred By: Anish Landa DO  Past Medical History Relevant to Rehab: Medical History[1]   Prior to Session Communication: Bedside nurse  Patient Position Received: Bed, 3 rail up, Alarm on  General Comment: Cleared and agreeable to participate in OT on 7th floor. (+)Tele.  Precautions:  LE Weight Bearing Status: Weight Bearing as Tolerated (L LE)  Medical Precautions: Fall precautions, Other (comment) (no active hip abduction per ortho consult on 6/1/25)     Date/Time Vitals Session Patient Position Pulse Resp SpO2 BP MAP (mmHg)    06/03/25 1144 --  --  88  18  94 %  137/75  --            Pain:  Pain Assessment  Pain Assessment: 0-10 (Pt did not rate pain. Observed grimmace of face during functional mobility. Not limited by pain. Resting comfortably at end of session)  Pain Location: Hip  Pain Orientation: Left    Objective   Cognition:  Overall Cognitive Status: Within Functional Limits  Orientation Level: Oriented X4             Home Living:  Type of Home: House  Lives With: Other (Comment) (Caregivers: a  and wife (per [facility] caregiver is pt's ex-wife))  Home Adaptive Equipment: Walker rolling or standard, Cane  Home Layout: Multi-level, Other (Comment) (Pt utilizes first floor kitchen/living area as well as stays in basement for bedroom and full bathroom. Stair lift to basement)  Home Access: Stairs to enter with rails  Entrance Stairs-Rails: Right  Entrance Stairs-Number of Steps: ~6  Bathroom Shower/Tub: Walk-in shower  Bathroom Toilet: Handicapped height  Bathroom Equipment: Grab bars in shower, Shower chair without back, Raised toilet seat with  "rails    Prior Function:  Level of [place]: Independent with ADLs and functional transfers, Independent with homemaking with ambulation  Receives Help From:  (Pt lives with a /wife who are his 2 caregivers)  ADL Assistance: Independent  Homemaking Assistance: Needs assistance (Pt reports does own laundry. Pt reports caregivers cook and he joins for dinner.  for cleaning)  Ambulatory Assistance: Needs assistance (Primarily SPC; reports caregivers have encouraged use of FWW recently)  Prior Function Comments: Pt reports being independent with ADLs PTA. Pt reports \"a few\" falls within last 6mo, would not quantify or describe reasons for falls.       ADL:  Grooming Assistance: Stand by  Grooming Deficit: Teeth care  Bathing Assistance: Minimal  Bathing Deficit: Left lower leg including foot, Right lower leg including foot  UE Dressing Assistance: Stand by  LE Dressing Assistance: Maximal  Toileting Assistance with Device: Not performed        Activities of Daily Living: Grooming  Grooming Level of Assistance: Contact guard, Close supervision  Grooming Where Assessed: Standing sinkside  Grooming Comments: Leaning on sink for balance    UE Bathing  UE Bathing Level of Assistance: Minimum assistance  UE Bathing Where Assessed: Edge of bed  UE Bathing Comments: Assist with posterior aspect of trunk    LE Bathing  LE Bathing Level of Assistance: Minimum assistance, Moderate assistance    UE Dressing  UE Dressing Level of Assistance: Contact guard              Activity Tolerance:  Endurance: Endurance does not limit participation in activity    Functional Standing Tolerance:       Bed Mobility/Transfers: Bed Mobility  Bed Mobility: Yes  Bed Mobility 1  Bed Mobility 1: Supine to sitting  Level of Assistance 1: Minimum assistance, Minimal verbal cues  Bed Mobility Comments 1: Completed bed mobility to exit to R of bed. Cues for hand/foot placement    Transfers  Transfer: Yes  Transfer 1  Technique 1: Sit " to stand, Stand to sit  Transfer Device 1: Walker  Transfer Level of Assistance 1: Minimum assistance, Contact guard, Minimal verbal cues  Trials/Comments 1: Cues for hand/foot placement to decrease risk of falls      Functional Mobility:  Functional Mobility  Functional Mobility Performed: Yes  Functional Mobility 1  Surface 1: Level tile  Device 1: Rolling walker  Assistance 1: Contact guard, Minimum assistance, Minimal verbal cues  Comments 1: Noted L foot drop and increased difficulty with completing L knee extension during functional mobility gait pattern. Cues for safety with AD mgmt.  Sitting Balance:  Static Sitting Balance  Static Sitting-Balance Support: Feet supported  Static Sitting-Level of Assistance: Independent  Dynamic Sitting Balance  Dynamic Sitting-Balance Support: Feet supported  Dynamic Sitting-Level of Assistance: Independent  Standing Balance:  Static Standing Balance  Static Standing-Balance Support:  (Unilateral UE support)  Static Standing-Level of Assistance: Close supervision  Dynamic Standing Balance  Dynamic Standing-Balance Support: Bilateral upper extremity supported  Dynamic Standing-Level of Assistance: Contact guard, Minimum assistance  Dynamic Standing-Comments: Occ Min A with LOB     Sensation:  Light Touch: Not tested     Coordination:  Movements are Fluid and Coordinated: Yes     Hand Function:  Hand Function  Gross Grasp: Functional  Coordination: Functional    Extremities: RUE   RUE : Within Functional Limits and LUE   LUE: Exceptions to WFL (L UE strength WFL. L hand ROM impaired at baseline)    Outcome Measures: [facility] Daily Activity  Putting on and taking off regular lower body clothing: A lot  Bathing (including washing, rinsing, drying): A little  Putting on and taking off regular upper body clothing: A little  Toileting, which includes using toilet, bedpan or urinal: A little  Taking care of personal grooming such as brushing teeth: A little  Eating Meals: None  Daily  Activity - Total Score: 18     and OT Adult Other Outcome Measures  Modified Barthel Index (Liang version): 59 (Severe dependence)    Education Documentation  Body Mechanics, taught by Tammi Nguyễn OT at 6/3/2025 11:45 AM.  Learner: Patient  Readiness: Acceptance  Method: Explanation, Demonstration  Response: Verbalizes Understanding, Demonstrated Understanding, Needs Reinforcement  Comment: Educated on purpose of OT, safe transfer techniques, ways to decrease fall risk.    Precautions, taught by Tammi Nguyễn OT at 6/3/2025 11:45 AM.  Learner: Patient  Readiness: Acceptance  Method: Explanation, Demonstration  Response: Verbalizes Understanding, Demonstrated Understanding, Needs Reinforcement  Comment: Educated on purpose of OT, safe transfer techniques, ways to decrease fall risk.    ADL Training, taught by Tammi Nguyễn OT at 6/3/2025 11:45 AM.  Learner: Patient  Readiness: Acceptance  Method: Explanation, Demonstration  Response: Verbalizes Understanding, Demonstrated Understanding, Needs Reinforcement  Comment: Educated on purpose of OT, safe transfer techniques, ways to decrease fall risk.    Education Comments  No comments found.          Goals:  Encounter Problems       Encounter Problems (Active)       Bathing       LTG - Patient will utilize adaptive techniques to bathe body Mod I       Start:  06/03/25    Expected End:  06/17/25               Dressings Lower Extremities       LTG - Patient will utilize adaptive techniques/equipment to dress lower body Mod I       Start:  06/03/25    Expected End:  06/17/25               Functional Balance       LTG - Patient will maintain standing balance SUP to allow for completion of daily activities       Start:  06/03/25    Expected End:  06/17/25               Grooming       LTG - Patient will complete daily grooming tasks Mod I       Start:  06/03/25    Expected End:  06/17/25               Toileting       LTG - Patient will complete daily  toileting tasks Mod I       Start:  06/03/25    Expected End:  06/17/25                      [1]   Past Medical History:  Diagnosis Date    Prostate cancer (Multi)     Stroke (Multi)

## 2025-06-03 NOTE — PROGRESS NOTES
Physical Therapy    Physical Therapy Treatment    Patient Name: Levon Mckay  MRN: [id number]  Department: [place] [id number]  Room: [address] [place] [address]  Today's Date: 6/3/2025  Time Calculation  Start Time: 1340  Stop Time: 1420  Time Calculation (min): 40 min         Assessment/Plan   PT Assessment  Rehab Prognosis: Good  Barriers to Discharge Home: Caregiver assistance, Physical needs  Caregiver Assistance: Caregiver assistance needed per identified barriers - however, level of patient's required assistance exceeds assistance available at home  Physical Needs: Stair navigation into home limited by function/safety, In-home setup navigation limited by function/safety, Ambulating household distances limited by function/safety, 24hr mobility assistance needed, 24hr ADL assistance needed  End of Session Communication: Bedside nurse  Assessment Comment: pt able to walk and complete exercises. pt with decreased strength and endurance  End of Session Patient Position: Alarm on, Bed, 3 rail up  PT Plan  Inpatient/Swing Bed or Outpatient: Inpatient  PT Plan  Treatment/Interventions: Bed mobility, Transfer training, Gait training, Strengthening  PT Plan: Ongoing PT  PT Frequency: 4 times per week  PT Discharge Recommendations: High intensity level of continued care  Equipment Recommended upon Discharge:  (TBD)  PT Recommended Transfer Status: Assist x1  PT - OK to Discharge: Yes (per POC)    PT Visit Info:  PT Received On: 06/03/25     General Visit Information:   General  Reason for Referral: Pt is a 86 yo male presenting due to mechanical fall after missing barstool while attempting stand to sit transition. Pt presenting due to L hip pain and found to have displaced [place] femoral greater trochanter fracture. Per orthopedic consultation assessment, no surgical intervention required and LLE WBAT  Referred By: Anish Landa DO  Prior to Session Communication: Bedside nurse  Patient Position Received: Bed, 3 rail up, Alarm  on  Preferred Learning Style: auditory, verbal  General Comment: pt agreeable to tx, slow moving with all activity    Subjective   Precautions:  Precautions  LE Weight Bearing Status: Weight Bearing as Tolerated  Medical Precautions: Fall precautions, Other (comment) (no active hip ABD)            Objective   Pain:  Pain Assessment  0-10 (Numeric) Pain Score: 0 - No pain  Cognition:  Cognition  Overall Cognitive Status: Within Functional Limits  Coordination:     Postural Control:  Static Sitting Balance  Static Sitting-Comment/Number of Minutes: pt performed at EOB with S, pt performed x4 min  Static Standing Balance  Static Standing-Comment/Number of Minutes: pt performed static standing balance with UE support at RW and  CGA,  x2-3 min    Treatments:  Therapeutic Exercise  Therapeutic Exercise Performed: Yes  Therapeutic Exercise Activity 1: pt performed supine ther ex x15 : AP, QS, GS, SAQ, Hip flexion,. vc/tc for form/hold time    Therapeutic Activity  Therapeutic Activity Performed: Yes  Therapeutic Activity 1: Educated pt on surgical precautions. pt stated understanding    Bed Mobility  Bed Mobility: Yes  Bed Mobility 1  Bed Mobility 1: Supine to sitting, Sitting to supine  Level of Assistance 1: Close supervision  Bed Mobility Comments 1: HOB min elevated, min vc req, min increased time    Ambulation/Gait Training  Ambulation/Gait Training Performed: Yes  Ambulation/Gait Training 1  Surface 1: Level tile  Device 1: Rolling walker  Gait Support Devices: Gait belt  Assistance 1: Contact guard  Comments/Distance (ft) 1: 20x3, 15x1 (step through gait pattern. max vc throughout to stay inside RW and cues for up right posture/forward gaze.  increased time req. x3 standing rest breaks.  no overt LOB.  vc thorughout to improve posture and saftey.)  Transfer 1  Technique 1: Sit to stand, Stand to sit  Transfer Device 1: Walker  Transfer Level of Assistance 1: Contact guard  Trials/Comments 1: vc/tc for hand placment  on solid sitting surface and not RW.    Outcome Measures:  [facility] Basic Mobility  Turning from your back to your side while in a flat bed without using bedrails: A little  Moving from lying on your back to sitting on the side of a flat bed without using bedrails: A little  Moving to and from bed to chair (including a wheelchair): A little  Standing up from a chair using your arms (e.g. wheelchair or bedside chair): A little  To walk in hospital room: A little  Climbing 3-5 steps with railing: Total  Basic Mobility - Total Score: 16    Education Documentation  Body Mechanics, taught by Axel Chaney PTA at 6/3/2025  3:13 PM.  Learner: Patient  Readiness: Acceptance  Method: Explanation  Response: Verbalizes Understanding    Home Exercise Program, taught by Axel Chaney PTA at 6/3/2025  3:13 PM.  Learner: Patient  Readiness: Acceptance  Method: Explanation  Response: Verbalizes Understanding    Mobility Training, taught by Axel Chaney PTA at 6/3/2025  3:13 PM.  Learner: Patient  Readiness: Acceptance  Method: Explanation  Response: Verbalizes Understanding    Education Comments  No comments found.        OP EDUCATION:       Encounter Problems       Encounter Problems (Active)       Mobility       LTG - Patient will navigate 4-6 steps with rails/device       Start:  06/02/25    Expected End:  06/16/25       CGA using HR          STG - Patient will ambulate (Progressing)       Start:  06/02/25    Expected End:  06/16/25       SBA using FWW >30ft            Mobility       HEP (Progressing)       Start:  06/02/25    Expected End:  06/16/25       Pt will complete L hip fracture HEP with SUP (handout not provided during evaluation - please provide)            PT Transfers       STG - Patient will perform bed mobility (Progressing)       Start:  06/02/25    Expected End:  06/16/25       Mod Ind         STG - Patient will transfer sit to and from stand (Progressing)       Start:  06/02/25    Expected End:   06/16/25       SUP            Pain - Adult

## 2025-06-03 NOTE — PROGRESS NOTES
6/3/2025 3:39 PM Message left for Destiny that patient will be discharged at 6:45 PM today. Li ANN

## 2025-06-03 NOTE — CARE PLAN
The patient's goals for the shift include      The clinical goals for the shift include safety and pain control    Problem: Pain - Adult  Goal: Verbalizes/displays adequate comfort level or baseline comfort level  Outcome: Progressing     Problem: Safety - Adult  Goal: Free from fall injury  Outcome: Progressing     Problem: Discharge Planning  Goal: Discharge to home or other facility with appropriate resources  Outcome: Progressing     Problem: Chronic Conditions and Co-morbidities  Goal: Patient's chronic conditions and co-morbidity symptoms are monitored and maintained or improved  Outcome: Progressing     Problem: Nutrition  Goal: Nutrient intake appropriate for maintaining nutritional needs  Outcome: Progressing     Problem: Skin  Goal: Decreased wound size/increased tissue granulation at next dressing change  Outcome: Progressing  Goal: Participates in plan/prevention/treatment measures  Outcome: Progressing  Goal: Prevent/manage excess moisture  Outcome: Progressing  Goal: Prevent/minimize sheer/friction injuries  Outcome: Progressing  Goal: Promote/optimize nutrition  Outcome: Progressing  Goal: Promote skin healing  Outcome: Progressing

## 2025-06-04 ENCOUNTER — LAB REQUISITION (OUTPATIENT)
Dept: LAB | Facility: HOSPITAL | Age: 85
End: 2025-06-04

## 2025-06-04 LAB
ANION GAP SERPL CALC-SCNC: 13 MMOL/L (ref 10–20)
BUN SERPL-MCNC: 23 MG/DL (ref 6–23)
CALCIUM SERPL-MCNC: 7.9 MG/DL (ref 8.6–10.3)
CHLORIDE SERPL-SCNC: 108 MMOL/L (ref 98–107)
CO2 SERPL-SCNC: 22 MMOL/L (ref 21–32)
CREAT SERPL-MCNC: 1.88 MG/DL (ref 0.5–1.3)
EGFRCR SERPLBLD CKD-EPI 2021: 35 ML/MIN/1.73M*2
ERYTHROCYTE [DISTWIDTH] IN BLOOD BY AUTOMATED COUNT: 12.2 % (ref 11.5–14.5)
GLUCOSE SERPL-MCNC: 81 MG/DL (ref 74–99)
HCT VFR BLD AUTO: 32.7 % (ref 41–52)
HGB BLD-MCNC: 11.2 G/DL (ref 13.5–17.5)
MCH RBC QN AUTO: 33.3 PG (ref 26–34)
MCHC RBC AUTO-ENTMCNC: 34.3 G/DL (ref 32–36)
MCV RBC AUTO: 97 FL (ref 80–100)
NRBC BLD-RTO: 0 /100 WBCS (ref 0–0)
PLATELET # BLD AUTO: 88 X10*3/UL (ref 150–450)
POTASSIUM SERPL-SCNC: 4 MMOL/L (ref 3.5–5.3)
RBC # BLD AUTO: 3.36 X10*6/UL (ref 4.5–5.9)
SODIUM SERPL-SCNC: 139 MMOL/L (ref 136–145)
WBC # BLD AUTO: 4.6 X10*3/UL (ref 4.4–11.3)

## 2025-06-04 PROCEDURE — 85027 COMPLETE CBC AUTOMATED: CPT

## 2025-06-04 PROCEDURE — 80048 BASIC METABOLIC PNL TOTAL CA: CPT

## 2025-06-05 ENCOUNTER — APPOINTMENT (OUTPATIENT)
Dept: PRIMARY CARE | Facility: CLINIC | Age: 85
End: 2025-06-05
Payer: MEDICARE

## 2025-06-05 NOTE — DOCUMENTATION CLARIFICATION NOTE
"    PATIENT:               STEPHANIE CELAYA  ACCT #:                  [id number]  MRN:                       [id number]  :                       1940  ADMIT DATE:       2025 11:38 PM  DISCH DATE:        6/3/2025 10:50 PM  RESPONDING PROVIDER #:        [id number]          PROVIDER RESPONSE TEXT:    CKD stage 3    CDI QUERY TEXT:    Clarification        Instruction:    Based on your assessment of the patient and the clinical information, please provide the requested documentation by clicking on the appropriate radio button and enter any additional information if prompted.    Question: Please further clarify the diagnosis of chronic kidney disease as    When answering this query, please exercise your independent professional judgment. The fact that a question is being asked, does not imply that any particular answer is desired or expected.    The patient's clinical indicators include:  Clinical Information:  Patient admitted / Nondisplaced proximal anterior left femoral periprosthetic fracture.    Clinical Indicators:    -H&P-assessment: \"RAYMON or CKD 3B\"    - SCr lab values: 1.97, 1.86, 1.93, 1.88    -GFR 25-25: 33, 35, 34, 35    Treatment: none    Risk Factors: HTN, advanced age  Options provided:  -- CKD stage 3  -- Other - I will add my own diagnosis  -- Refer to Clinical Documentation Reviewer    Query created by: Qian Zambrano on 2025 4:03 PM      Electronically signed by:  MOISÉS BHATIA MD 2025 8:14 AM          "

## 2025-06-05 NOTE — DOCUMENTATION CLARIFICATION NOTE
"    PATIENT:               STEPHANIE CELAYA  ACCT #:                  [id number]  MRN:                       [id number]  :                       1940  ADMIT DATE:       2025 11:38 PM  DISCH DATE:        6/3/2025 10:50 PM  RESPONDING PROVIDER #:        [id number]          PROVIDER RESPONSE TEXT:    Acute Kidney Injury is ruled out    CDI QUERY TEXT:    Clarification        Instruction:    Based on your assessment of the patient and the clinical information, please provide the requested documentation by clicking on the appropriate radio button and enter any additional information if prompted.    Question: Please clarify the diagnosis of Acute Kidney Injury    When answering this query, please exercise your independent professional judgment. The fact that a question is being asked, does not imply that any particular answer is desired or expected.    The patient's clinical indicators include:  Clinical Information: Patient admitted / Nondisplaced proximal anterior left femoral periprosthetic fracture. RAYMON note on H&P    Documented Diagnosis: H&P-assessment: \"RAYMON or CKD 3B\"    Clinical Indicators and Documentation: 25  -Vital Signs-ED:  Temp-98.2, HR-79, RR-20, BP-104/61, SpO2-96 percent on RA  -Baseline Creatinine: unknown  -SCr lab values: 1.97, 1.86, 1.93, 1.88  -Urine Output: 100 mL on 25 at 12:11, 150mL at 18:25 and 175 at 19:58  -Electrolyte lab values: Sodium-140, Potassium-4.3, Chloride-107  -Nephrology consult: No  -GFR 25-25: 33, 35, 34, 35  -Discharge summary-hospital course: RAYMON or CKD 3B: Likely prerenal azotemia-cr now stable likely at new baseline    Treatment: none    Risk Factors:  CT chest/abd/pelvis with IV contrast on 25, possible CKD  Options provided:  -- Acute Kidney Injury is ruled out  -- Acute Kidney Injury ruled in as evidenced by, Please specify additional information below  -- Other - I will add my own diagnosis  -- Refer to Clinical Documentation Reviewer    Query " created by: Qian Zambrano on 6/4/2025 3:53 PM      Electronically signed by:  MOISÉS BHATIA MD 6/5/2025 8:14 AM

## 2025-06-08 NOTE — DOCUMENTATION CLARIFICATION NOTE
"    PATIENT:               STEPHANIE CELAYA  ACCT #:                  [id number]  MRN:                       [id number]  :                       1940  ADMIT DATE:       2025 11:38 PM  DISCH DATE:        6/3/2025 10:50 PM  RESPONDING PROVIDER #:        [id number]          PROVIDER RESPONSE TEXT:    Left proximal anterior femur pathologic fracture on periprosthetic fracture    CDI QUERY TEXT:    Clarification      Instruction:    Based on your assessment of the patient and the clinical information, please provide the requested documentation by clicking on the appropriate radio button and enter any additional information if prompted.    Question: Please further specify the type of fracture and/or site as    When answering this query, please exercise your independent professional judgment. The fact that a question is being asked, does not imply that any particular answer is desired or expected.    The patient's clinical indicators include:  Clinical Information: Patient admitted 2/2 Nondisplaced proximal anterior left femoral periprosthetic fracture.  Patient with cancer and possible bone mets, osteopenia      Clinical Indicators:    -H&P-assessment/plan: \" Periprosthetic fracture of the left hip: Secondary to mechanical fall at home. CT abdomen and pelvis revealed Osteopenia. Status post left hip hemiarthroplasty. The previously described acute, nondisplaced, periprosthetic fracture at the proximal left femur. Prostate cancer- Undergoing therapy\"      -25 XR left hip/pelvis: \"Nondisplaced proximal anterior left femoral periprosthetic fracture\"      -25 CT of left hip: \"There is a nondisplaced periprosthetic fracture of the left proximal femur anteriorly extending from roughly the level of the greater  trochanter to the anterior cortex of the proximal diaphysis adjacent to the femoral component of the left hip hemiarthroplasty.\"      -25 Ortho consult note-assessment: \"L minimally displaced vancouver Ag " "fx . PMH prostate cancer with met to R ilium s/p therapy\"      Treatment: acetaminophen-6/2/25, Pain control-oxycodone PRN, PT and OT-6/2/25 and 6/3/25    Risk Factors: advanced age, osteopenia, prostate cancer with h/o of ilium mets- not sure if the mets is still present  Options provided:  -- Traumatic closed left proximal anterior femur periprosthetic fracture, Please specify additional information below  -- Left proximal anterior femur pathologic fracture on periprosthetic fracture  -- Other - I will add my own diagnosis  -- Refer to Clinical Documentation Reviewer    Query created by: Qian Zambrano on 6/5/2025 12:51 PM      Electronically signed by:  MOISÉS BHATIA MD 6/8/2025 11:02 AM          "

## 2025-06-10 ENCOUNTER — LAB REQUISITION (OUTPATIENT)
Dept: LAB | Facility: HOSPITAL | Age: 85
End: 2025-06-10
Payer: MEDICARE

## 2025-06-10 LAB
ANION GAP SERPL CALC-SCNC: 15 MMOL/L (ref 10–20)
BUN SERPL-MCNC: 37 MG/DL (ref 6–23)
CALCIUM SERPL-MCNC: 8.7 MG/DL (ref 8.6–10.3)
CHLORIDE SERPL-SCNC: 112 MMOL/L (ref 98–107)
CO2 SERPL-SCNC: 19 MMOL/L (ref 21–32)
CREAT SERPL-MCNC: 1.74 MG/DL (ref 0.5–1.3)
EGFRCR SERPLBLD CKD-EPI 2021: 38 ML/MIN/1.73M*2
ERYTHROCYTE [DISTWIDTH] IN BLOOD BY AUTOMATED COUNT: 12.2 % (ref 11.5–14.5)
GLUCOSE SERPL-MCNC: 86 MG/DL (ref 74–99)
HCT VFR BLD AUTO: 36.6 % (ref 41–52)
HGB BLD-MCNC: 11.8 G/DL (ref 13.5–17.5)
MCH RBC QN AUTO: 33.1 PG (ref 26–34)
MCHC RBC AUTO-ENTMCNC: 32.2 G/DL (ref 32–36)
MCV RBC AUTO: 103 FL (ref 80–100)
NRBC BLD-RTO: 0 /100 WBCS (ref 0–0)
PLATELET # BLD AUTO: 137 X10*3/UL (ref 150–450)
POTASSIUM SERPL-SCNC: 4.8 MMOL/L (ref 3.5–5.3)
RBC # BLD AUTO: 3.56 X10*6/UL (ref 4.5–5.9)
SODIUM SERPL-SCNC: 141 MMOL/L (ref 136–145)
WBC # BLD AUTO: 5.1 X10*3/UL (ref 4.4–11.3)

## 2025-06-10 PROCEDURE — 85027 COMPLETE CBC AUTOMATED: CPT

## 2025-06-10 PROCEDURE — 80048 BASIC METABOLIC PNL TOTAL CA: CPT

## 2025-06-11 ENCOUNTER — HOME HEALTH ADMISSION (OUTPATIENT)
Dept: HOME HEALTH SERVICES | Facility: HOME HEALTH | Age: 85
End: 2025-06-11
Payer: MEDICARE

## 2025-06-11 ENCOUNTER — DOCUMENTATION (OUTPATIENT)
Dept: HOME HEALTH SERVICES | Facility: HOME HEALTH | Age: 85
End: 2025-06-11

## 2025-06-11 ENCOUNTER — TELEPHONE (OUTPATIENT)
Dept: PRIMARY CARE | Facility: CLINIC | Age: 85
End: 2025-06-11

## 2025-06-11 DIAGNOSIS — E04.1 RIGHT THYROID NODULE: Primary | ICD-10-CM

## 2025-06-11 NOTE — TELEPHONE ENCOUNTER
----- Message from Ilir Bear sent at 6/11/2025 12:57 PM EDT -----  Let pt/family know incidental thyroid nodule noted on CT  Rec US for fu when pt able to schedule  ----- Message -----  From: Zuleyka Robles  Sent: 6/4/2025  12:23 PM EDT  To: Ilir Bear MD; Rad Actionable Finding#    Hello Dr. Bear,  I am writing to inform you of incidental findings reported by our radiologist on the attached patient who was recently seen in the ER. As the Primary Care Provider, we wanted to ensure you are aware of these findings. Please review the findings detailed in attached report and reg them as reviewed. Or respond to this message to acknowledge.  Best regards,  Alexa - Radiology Actionable Findings Department

## 2025-06-11 NOTE — HH CARE COORDINATION
Home Care received a Referral for Nursing, Physical Therapy, Occupational Therapy, and Speech Language Pathology. We have processed the referral for a Start of Care on 24-48 HOURS POST DC .     If you have any questions or concerns, please feel free to contact us at [phone]. Follow the prompts, enter your five digit zip code, and you will be directed to your care team on EAST 3.

## 2025-06-13 ENCOUNTER — HOME CARE VISIT (OUTPATIENT)
Dept: HOME HEALTH SERVICES | Facility: HOME HEALTH | Age: 85
End: 2025-06-13
Payer: MEDICARE

## 2025-06-13 VITALS — HEART RATE: 108 BPM | OXYGEN SATURATION: 99 % | TEMPERATURE: 96.9 F

## 2025-06-13 PROCEDURE — 169592 NO-PAY CLAIM PROCEDURE

## 2025-06-13 PROCEDURE — G0299 HHS/HOSPICE OF RN EA 15 MIN: HCPCS | Mod: HHH

## 2025-06-13 ASSESSMENT — ENCOUNTER SYMPTOMS
PAIN LOCATION - PAIN SEVERITY: 2/10
CHANGE IN APPETITE: UNCHANGED
BOWEL PATTERN NORMAL: 1
SHORTNESS OF BREATH: 1
LIMITED RANGE OF MOTION: 1
DYSPNEA ACTIVITY LEVEL: AFTER AMBULATING 10 - 20 FT
STOOL FREQUENCY: LESS THAN DAILY
DIZZINESS: 1
PAIN LOCATION: LEFT HIP
PERSON REPORTING PAIN: PATIENT
FREQUENCY: 1
LOSS OF VISUAL FIELD: 1
SUBJECTIVE PAIN PROGRESSION: WAXING AND WANING
PAIN: 1
HYPERTENSION: 1
APPETITE LEVEL: FAIR
MUSCLE WEAKNESS: 1
FATIGUES EASILY: 1

## 2025-06-13 ASSESSMENT — ACTIVITIES OF DAILY LIVING (ADL): ENTERING_EXITING_HOME: MODERATE ASSIST

## 2025-06-14 LAB
ATRIAL RATE: 87 BPM
P AXIS: -9 DEGREES
P OFFSET: 195 MS
P ONSET: 145 MS
PR INTERVAL: 134 MS
Q ONSET: 212 MS
QRS COUNT: 14 BEATS
QRS DURATION: 102 MS
QT INTERVAL: 386 MS
QTC CALCULATION(BAZETT): 464 MS
QTC FREDERICIA: 436 MS
R AXIS: -8 DEGREES
T AXIS: -12 DEGREES
T OFFSET: 405 MS
VENTRICULAR RATE: 87 BPM

## 2025-06-14 ASSESSMENT — ACTIVITIES OF DAILY LIVING (ADL): OASIS_M1830: 04

## 2025-06-16 ENCOUNTER — HOME CARE VISIT (OUTPATIENT)
Dept: HOME HEALTH SERVICES | Facility: HOME HEALTH | Age: 85
End: 2025-06-16
Payer: MEDICARE

## 2025-06-16 VITALS — OXYGEN SATURATION: 98 % | SYSTOLIC BLOOD PRESSURE: 132 MMHG | HEART RATE: 95 BPM | DIASTOLIC BLOOD PRESSURE: 76 MMHG

## 2025-06-16 PROCEDURE — G0151 HHCP-SERV OF PT,EA 15 MIN: HCPCS | Mod: HHH

## 2025-06-16 PROCEDURE — G0152 HHCP-SERV OF OT,EA 15 MIN: HCPCS | Mod: HHH

## 2025-06-16 ASSESSMENT — ENCOUNTER SYMPTOMS
PERSON REPORTING PAIN: PATIENT
DESCRIPTION OF MEMORY LOSS: SHORT TERM
PAIN LOCATION: LEFT HIP
PAIN: 1
PAIN: 1
PERSON REPORTING PAIN: PATIENT
PAIN LOCATION - PAIN SEVERITY: 1/10
SUBJECTIVE PAIN PROGRESSION: WAXING AND WANING
LOWEST PAIN SEVERITY IN PAST 24 HOURS: 1/10
DESCRIPTION OF MEMORY LOSS: IMMEDIATE
HIGHEST PAIN SEVERITY IN PAST 24 HOURS: 5/10

## 2025-06-18 ENCOUNTER — HOME CARE VISIT (OUTPATIENT)
Dept: HOME HEALTH SERVICES | Facility: HOME HEALTH | Age: 85
End: 2025-06-18
Payer: MEDICARE

## 2025-06-18 PROCEDURE — G0299 HHS/HOSPICE OF RN EA 15 MIN: HCPCS | Mod: HHH

## 2025-06-19 ENCOUNTER — HOME CARE VISIT (OUTPATIENT)
Dept: HOME HEALTH SERVICES | Facility: HOME HEALTH | Age: 85
End: 2025-06-19
Payer: MEDICARE

## 2025-06-19 VITALS — DIASTOLIC BLOOD PRESSURE: 61 MMHG | HEART RATE: 80 BPM | OXYGEN SATURATION: 99 % | SYSTOLIC BLOOD PRESSURE: 139 MMHG

## 2025-06-19 PROCEDURE — G0153 HHCP-SVS OF S/L PATH,EA 15MN: HCPCS | Mod: HHH

## 2025-06-19 PROCEDURE — G0152 HHCP-SERV OF OT,EA 15 MIN: HCPCS | Mod: HHH

## 2025-06-19 PROCEDURE — G0151 HHCP-SERV OF PT,EA 15 MIN: HCPCS | Mod: HHH

## 2025-06-19 SDOH — HEALTH STABILITY: PHYSICAL HEALTH

## 2025-06-19 SDOH — HEALTH STABILITY: PHYSICAL HEALTH: EXERCISE ACTIVITY: ISO HIP ADDUCTION

## 2025-06-19 SDOH — HEALTH STABILITY: PHYSICAL HEALTH: PHYSICAL EXERCISE: 10

## 2025-06-19 SDOH — HEALTH STABILITY: PHYSICAL HEALTH: PHYSICAL EXERCISE: SUPINE

## 2025-06-19 SDOH — HEALTH STABILITY: PHYSICAL HEALTH: EXERCISE ACTIVITIES SETS: 1

## 2025-06-19 SDOH — HEALTH STABILITY: PHYSICAL HEALTH: EXERCISE ACTIVITY: SIDE STEPPING

## 2025-06-19 SDOH — HEALTH STABILITY: PHYSICAL HEALTH: EXERCISE ACTIVITIES SETS: 3

## 2025-06-19 SDOH — HEALTH STABILITY: PHYSICAL HEALTH: EXERCISE ACTIVITY: ISO HIP ABDUCTION

## 2025-06-19 SDOH — HEALTH STABILITY: PHYSICAL HEALTH: EXERCISE ACTIVITY: MANUAL DORSIFLEXION STRETCH

## 2025-06-19 SDOH — HEALTH STABILITY: PHYSICAL HEALTH: RESISTANCE: 3#

## 2025-06-19 SDOH — HEALTH STABILITY: PHYSICAL HEALTH: PHYSICAL EXERCISE: STANDING

## 2025-06-19 SDOH — HEALTH STABILITY: PHYSICAL HEALTH: EXERCISE ACTIVITY: MARCH TO 90

## 2025-06-19 SDOH — HEALTH STABILITY: PHYSICAL HEALTH: PHYSICAL EXERCISE: 5

## 2025-06-19 SDOH — HEALTH STABILITY: PHYSICAL HEALTH: EXERCISE ACTIVITY: SAQ

## 2025-06-19 SDOH — HEALTH STABILITY: PHYSICAL HEALTH: RESISTANCE: NONE  3X 30S

## 2025-06-19 ASSESSMENT — ENCOUNTER SYMPTOMS
HIGHEST PAIN SEVERITY IN PAST 24 HOURS: 2/10
PAIN LOCATION: LEFT HIP
PAIN: 1
PAIN LOCATION - PAIN SEVERITY: 0/10
SUBJECTIVE PAIN PROGRESSION: GRADUALLY IMPROVING
PAIN SEVERITY GOAL: 0/10
LOWEST PAIN SEVERITY IN PAST 24 HOURS: 0/10
PERSON REPORTING PAIN: PATIENT

## 2025-06-20 VITALS
SYSTOLIC BLOOD PRESSURE: 136 MMHG | DIASTOLIC BLOOD PRESSURE: 76 MMHG | RESPIRATION RATE: 16 BRPM | TEMPERATURE: 97.6 F | HEART RATE: 83 BPM | OXYGEN SATURATION: 97 %

## 2025-06-20 ASSESSMENT — ENCOUNTER SYMPTOMS
CHANGE IN APPETITE: UNCHANGED
APPETITE LEVEL: GOOD
DENIES PAIN: 1
OCCASIONAL FEELINGS OF UNSTEADINESS: 0

## 2025-06-25 ENCOUNTER — HOME CARE VISIT (OUTPATIENT)
Dept: HOME HEALTH SERVICES | Facility: HOME HEALTH | Age: 85
End: 2025-06-25
Payer: MEDICARE

## 2025-06-25 VITALS — HEART RATE: 76 BPM | SYSTOLIC BLOOD PRESSURE: 117 MMHG | OXYGEN SATURATION: 98 % | DIASTOLIC BLOOD PRESSURE: 63 MMHG

## 2025-06-25 PROCEDURE — G0151 HHCP-SERV OF PT,EA 15 MIN: HCPCS | Mod: HHH

## 2025-06-25 SDOH — HEALTH STABILITY: PHYSICAL HEALTH

## 2025-06-25 SDOH — HEALTH STABILITY: PHYSICAL HEALTH: PHYSICAL EXERCISE: SITTING

## 2025-06-25 SDOH — HEALTH STABILITY: PHYSICAL HEALTH: PHYSICAL EXERCISE: 10

## 2025-06-25 SDOH — HEALTH STABILITY: PHYSICAL HEALTH: PHYSICAL EXERCISE: 5

## 2025-06-25 SDOH — HEALTH STABILITY: PHYSICAL HEALTH: EXERCISE ACTIVITY: ISO ADDUCTION (BALL BETWEEN LEGS)

## 2025-06-25 SDOH — HEALTH STABILITY: PHYSICAL HEALTH: EXERCISE ACTIVITIES SETS: 3

## 2025-06-25 SDOH — HEALTH STABILITY: PHYSICAL HEALTH: EXERCISE ACTIVITY: STANDING SHOULDER WIDTH WITH EYES CLOSED

## 2025-06-25 SDOH — HEALTH STABILITY: PHYSICAL HEALTH: EXERCISE ACTIVITIES SETS: 1

## 2025-06-25 SDOH — HEALTH STABILITY: PHYSICAL HEALTH: RESISTANCE: 3#

## 2025-06-25 SDOH — HEALTH STABILITY: PHYSICAL HEALTH: EXERCISE ACTIVITIES SETS: 2

## 2025-06-25 SDOH — HEALTH STABILITY: PHYSICAL HEALTH: EXERCISE ACTIVITY: ISO ABDUCTION USING GAIT BELT

## 2025-06-25 SDOH — HEALTH STABILITY: PHYSICAL HEALTH: EXERCISE ACTIVITY: SIDE STEPS

## 2025-06-25 SDOH — HEALTH STABILITY: PHYSICAL HEALTH: PHYSICAL EXERCISE: STANDING

## 2025-06-25 SDOH — HEALTH STABILITY: PHYSICAL HEALTH: EXERCISE ACTIVITY: REACHES TO SHORT STOOL

## 2025-06-25 SDOH — HEALTH STABILITY: PHYSICAL HEALTH: EXERCISE ACTIVITY: TOE TAPS

## 2025-06-25 SDOH — HEALTH STABILITY: PHYSICAL HEALTH: RESISTANCE: NONE 3X30S

## 2025-06-25 SDOH — HEALTH STABILITY: PHYSICAL HEALTH: EXERCISE ACTIVITY: SIT TO STANDS

## 2025-06-25 SDOH — HEALTH STABILITY: PHYSICAL HEALTH: EXERCISE ACTIVITY: MARCHES

## 2025-06-25 SDOH — HEALTH STABILITY: PHYSICAL HEALTH: EXERCISE ACTIVITY: GASTROC/HAMSTRING STRETCH ELEVATED ON STOOL

## 2025-06-25 SDOH — HEALTH STABILITY: PHYSICAL HEALTH: PHYSICAL EXERCISE: SIT/STAND

## 2025-06-25 SDOH — HEALTH STABILITY: PHYSICAL HEALTH: EXERCISE ACTIVITY: HEEL TOE WEIGHT SHIFT

## 2025-06-25 SDOH — HEALTH STABILITY: PHYSICAL HEALTH: EXERCISE ACTIVITY: LAQ  LEFT

## 2025-06-25 ASSESSMENT — ENCOUNTER SYMPTOMS
PAIN LOCATION - PAIN SEVERITY: 3/10
PERSON REPORTING PAIN: PATIENT
PAIN: 1
PAIN LOCATION: LEFT HIP

## 2025-06-26 ENCOUNTER — HOME CARE VISIT (OUTPATIENT)
Dept: HOME HEALTH SERVICES | Facility: HOME HEALTH | Age: 85
End: 2025-06-26
Payer: MEDICARE

## 2025-06-26 PROCEDURE — G0153 HHCP-SVS OF S/L PATH,EA 15MN: HCPCS | Mod: HHH

## 2025-06-27 ENCOUNTER — HOME CARE VISIT (OUTPATIENT)
Dept: HOME HEALTH SERVICES | Facility: HOME HEALTH | Age: 85
End: 2025-06-27
Payer: MEDICARE

## 2025-06-27 VITALS — DIASTOLIC BLOOD PRESSURE: 84 MMHG | OXYGEN SATURATION: 99 % | SYSTOLIC BLOOD PRESSURE: 118 MMHG | HEART RATE: 66 BPM

## 2025-06-27 PROCEDURE — G0151 HHCP-SERV OF PT,EA 15 MIN: HCPCS | Mod: HHH

## 2025-06-27 SDOH — HEALTH STABILITY: PHYSICAL HEALTH: EXERCISE ACTIVITIES SETS: 3

## 2025-06-27 SDOH — HEALTH STABILITY: PHYSICAL HEALTH: PHYSICAL EXERCISE: 10

## 2025-06-27 SDOH — HEALTH STABILITY: PHYSICAL HEALTH

## 2025-06-27 SDOH — HEALTH STABILITY: PHYSICAL HEALTH: EXERCISE ACTIVITY: LAQ BL

## 2025-06-27 SDOH — HEALTH STABILITY: PHYSICAL HEALTH: PHYSICAL EXERCISE: SITTING

## 2025-06-27 SDOH — HEALTH STABILITY: PHYSICAL HEALTH: EXERCISE ACTIVITY: ISO ADDUCTION WITH SOCCER BALL

## 2025-06-27 SDOH — HEALTH STABILITY: PHYSICAL HEALTH: RESISTANCE: 3#

## 2025-06-27 ASSESSMENT — ENCOUNTER SYMPTOMS
PERSON REPORTING PAIN: PATIENT
DENIES PAIN: 1

## 2025-06-30 ENCOUNTER — HOME CARE VISIT (OUTPATIENT)
Dept: HOME HEALTH SERVICES | Facility: HOME HEALTH | Age: 85
End: 2025-06-30
Payer: MEDICARE

## 2025-06-30 VITALS — OXYGEN SATURATION: 97 % | HEART RATE: 80 BPM | SYSTOLIC BLOOD PRESSURE: 131 MMHG | DIASTOLIC BLOOD PRESSURE: 60 MMHG

## 2025-06-30 PROCEDURE — G0151 HHCP-SERV OF PT,EA 15 MIN: HCPCS | Mod: HHH

## 2025-06-30 SDOH — HEALTH STABILITY: PHYSICAL HEALTH: PHYSICAL EXERCISE: SITTING

## 2025-06-30 SDOH — HEALTH STABILITY: PHYSICAL HEALTH: EXERCISE ACTIVITIES SETS: 3

## 2025-06-30 SDOH — HEALTH STABILITY: PHYSICAL HEALTH: RESISTANCE: NONE 3X30S

## 2025-06-30 SDOH — HEALTH STABILITY: PHYSICAL HEALTH: PHYSICAL EXERCISE: 10

## 2025-06-30 SDOH — HEALTH STABILITY: PHYSICAL HEALTH

## 2025-06-30 SDOH — HEALTH STABILITY: PHYSICAL HEALTH: EXERCISE ACTIVITY: GASTROC/HAMSTRING STRETCH <90 DEGREES

## 2025-06-30 SDOH — HEALTH STABILITY: PHYSICAL HEALTH: EXERCISE ACTIVITY: LAQ

## 2025-06-30 SDOH — HEALTH STABILITY: PHYSICAL HEALTH: EXERCISE ACTIVITY: ISO ADDUCTION W/ BALL

## 2025-06-30 SDOH — HEALTH STABILITY: PHYSICAL HEALTH: RESISTANCE: 3#

## 2025-06-30 SDOH — HEALTH STABILITY: PHYSICAL HEALTH: PHYSICAL EXERCISE: SITTING WITH LEG ELEVATED

## 2025-06-30 ASSESSMENT — ENCOUNTER SYMPTOMS
PERSON REPORTING PAIN: PATIENT
DENIES PAIN: 1

## 2025-07-01 ENCOUNTER — APPOINTMENT (OUTPATIENT)
Dept: RADIOLOGY | Facility: CLINIC | Age: 85
End: 2025-07-01
Payer: MEDICARE

## 2025-07-01 DIAGNOSIS — E04.1 RIGHT THYROID NODULE: ICD-10-CM

## 2025-07-01 PROCEDURE — 76536 US EXAM OF HEAD AND NECK: CPT | Performed by: RADIOLOGY

## 2025-07-01 PROCEDURE — 76536 US EXAM OF HEAD AND NECK: CPT

## 2025-07-03 ENCOUNTER — HOME CARE VISIT (OUTPATIENT)
Dept: HOME HEALTH SERVICES | Facility: HOME HEALTH | Age: 85
End: 2025-07-03
Payer: MEDICARE

## 2025-07-03 PROCEDURE — G0153 HHCP-SVS OF S/L PATH,EA 15MN: HCPCS | Mod: HHH

## 2025-07-04 ENCOUNTER — HOME CARE VISIT (OUTPATIENT)
Dept: HOME HEALTH SERVICES | Facility: HOME HEALTH | Age: 85
End: 2025-07-04
Payer: MEDICARE

## 2025-07-06 ASSESSMENT — ENCOUNTER SYMPTOMS
FEVER: 0
DIFFICULTY URINATING: 0
SLEEP DISTURBANCE: 1
EXTREMITY WEAKNESS: 0
FREQUENCY: 1
ROS GI COMMENTS: HEARTBURN
APPETITE CHANGE: 0
HEMATURIA: 0
MUSCULOSKELETAL NEGATIVE: 1
HEMATOLOGIC/LYMPHATIC NEGATIVE: 1
DECREASED CONCENTRATION: 1
UNEXPECTED WEIGHT CHANGE: 0
FATIGUE: 0
HOT FLASHES: 0

## 2025-07-06 NOTE — PROGRESS NOTES
Patient ID: Levon Mckay is a 85 y.o. male.  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  MedOnc: Dr. Geoff Peacockc: Dr. Caballero  Urology: Dr. Escobedo     Patient Care Team:  Ilir Bear MD as PCP - General  Ilir Bear MD as PCP - [facility]ributed [facility]  JESSE Dwyer-CNP as Nurse Practitioner (Hematology and Oncology)  Raymon Tellez MD as Medical Oncologist (Hematology and Oncology)  JESSE Oates-CNP as Consulting Physician (Radiation Oncology)    Current Therapy: Surveillance     ONCOLOGIC HISTORY  6/28/22 Elevated PSA of 12.61   7/5/22 PSA rechecked 15.9.   11/2/22 MRI prostate PIRADS 5   12/20/22 Biopsy (Clatonia 7 (3+4) disease, up to 70% of tissue involved in tumor)   1/2023 - PSMA reveals R iliac bone met  2/7/23 - ADT 6 months started. plan for XRT prostate and SBRT iliac bone  3/14/23 - XRT started  6/29/23 - PSA < 0.1 T < 10  6/24/24 - PSA 0.11 -- T 749  10/1/24 - PSA<0.1 -- T 554  1/3/25 - PSA 0.14 -- T 796  06/2025 - hip # on conservative management  7/7/25 - PSA 0.16    --  Prostate cancer, cT1c, GG2 (50% of cores+, 1/1+ targeted), iPSA 15.9.  11/2/2022 MRI prostate noted a 31.6 cc gland, PI-RADS 5 lesion in the left PZ posteriorly at mid gland with extension into the left TZ.  Broad capsular abutment with focal bulging and capsular irregularity, but no gross RUCHI.  No evidence of SV invasion.  No lymphadenopathy.  12/20/2022 transperineal biopsy noted Clatonia 3+4 in 50% of the cores, Clatonia 3+4 in 1/1 targeted core.  Cintia 4 involvement ranges 5 to 15%.  No evidence of intraductal carcinoma or cribriform pattern.  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  Surgical  History:  Levon has a past surgical history that includes Other surgical history (06/28/2022); Other surgical history (06/28/2022); and Other surgical history (12/07/2022).  Social History:  Levon reports that he has never smoked. He has never been exposed to tobacco smoke. He has never used smokeless tobacco. He reports that he does not currently use alcohol. He reports that he does not use drugs.  Family History:  No family history on file.  Family Oncology History:  Cancer-related family history is not on file.    Review of Systems   Constitutional:  Negative for appetite change, fatigue, fever and unexpected weight change.   Gastrointestinal:         Heartburn   Endocrine: Negative for hot flashes.   Genitourinary:  Positive for frequency and nocturia. Negative for difficulty urinating and hematuria.    Musculoskeletal: Negative.  Negative for gait problem.   Neurological:  Negative for extremity weakness and gait problem.   Hematological: Negative.    Psychiatric/Behavioral:  Positive for decreased concentration and sleep disturbance.      Allergies  No Active Allergies     Medications  Current Outpatient Medications   Medication Instructions    acetaminophen (TYLENOL) 650 mg, oral, Every 4 hours PRN    amLODIPine (NORVASC) 7.5 mg, oral, Daily    aspirin 81 mg capsule 1 capsule, oral, Every 24 hours    atorvastatin (Lipitor) 20 mg tablet TAKE 1 TABLET BY MOUTH ONCE  DAILY AS DIRECTED    baclofen (LIORESAL) 5 mg, oral, Every 8 hours    baclofen (LIORESAL) 5 mg, oral, Every 8 hours    hydrALAZINE (APRESOLINE) 50 mg, oral, 2 times daily    hydrALAZINE (APRESOLINE) 50 mg, oral, 2 times daily    lisinopril 20 mg, oral, Daily    oxyCODONE (ROXICODONE) 5 mg, oral, Every 4 hours PRN    oxyCODONE (ROXICODONE) 5 mg, oral, Every 4 hours    sertraline (ZOLOFT) 100 mg, oral, Daily    tamsulosin (FLOMAX) 0.4 mg, oral, Daily    trospium (SANCTURA) 20 mg, oral, Daily        OBJECTIVE:    VS / Pain:  There were no vitals  taken for this visit.  BSA: There is no height or weight on file to calculate BSA.  Wt Readings from Last 5 Encounters:   05/31/25 75.8 kg (167 lb)   03/26/25 75.8 kg (167 lb)   01/15/25 75.8 kg (167 lb)   11/14/24 74.8 kg (165 lb)   08/20/24 76.2 kg (168 lb)       Diagnostic Results   No results found for this or any previous visit (from the past 96 hours).    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 6 months ADT and XRT prostate and bone completed with undetectable PSA / recovered T levels.   Again, with M1-directed therapy and short term ADT, given age / comorbidities we are comfortable holding therapy. He broke his hip and got scans and no new lesions there so we will continue to monitor with PSA/T in few months.    I saw and evaluated the patient. I personally obtained the key and critical portions of the history and physical exam or was physically present for key and critical portions performed by the resident/fellow. I reviewed the resident/fellow's documentation and discussed the patient with the resident/fellow. I agree with the resident/fellow's medical decision making as documented in the note.   Raymon Tellez MD MSc [facility] Chair in Cancer Research   in Medicine [facility] [employer]  Director Clinical  Medical Oncology Research Program   [facility]  Cell [phone]  Office [phone]

## 2025-07-07 ENCOUNTER — OFFICE VISIT (OUTPATIENT)
Dept: ORTHOPEDIC SURGERY | Facility: HOSPITAL | Age: 85
End: 2025-07-07
Payer: MEDICARE

## 2025-07-07 ENCOUNTER — OFFICE VISIT (OUTPATIENT)
Dept: HEMATOLOGY/ONCOLOGY | Facility: CLINIC | Age: 85
End: 2025-07-07
Payer: MEDICARE

## 2025-07-07 ENCOUNTER — HOSPITAL ENCOUNTER (OUTPATIENT)
Dept: RADIOLOGY | Facility: HOSPITAL | Age: 85
Discharge: HOME | End: 2025-07-07
Payer: MEDICARE

## 2025-07-07 VITALS
OXYGEN SATURATION: 96 % | SYSTOLIC BLOOD PRESSURE: 149 MMHG | HEART RATE: 88 BPM | DIASTOLIC BLOOD PRESSURE: 86 MMHG | BODY MASS INDEX: 22.78 KG/M2 | WEIGHT: 168 LBS | TEMPERATURE: 98.6 F | RESPIRATION RATE: 18 BRPM

## 2025-07-07 DIAGNOSIS — M97.8XXD PERIPROSTHETIC FRACTURE OF HIP, SUBSEQUENT ENCOUNTER: ICD-10-CM

## 2025-07-07 DIAGNOSIS — C61 PROSTATE CANCER (MULTI): Primary | ICD-10-CM

## 2025-07-07 DIAGNOSIS — Z96.649 PERIPROSTHETIC FRACTURE OF HIP, SUBSEQUENT ENCOUNTER: ICD-10-CM

## 2025-07-07 DIAGNOSIS — M97.8XXD PERIPROSTHETIC FRACTURE OF HIP, SUBSEQUENT ENCOUNTER: Primary | ICD-10-CM

## 2025-07-07 DIAGNOSIS — Z96.649 PERIPROSTHETIC FRACTURE OF HIP, SUBSEQUENT ENCOUNTER: Primary | ICD-10-CM

## 2025-07-07 PROCEDURE — 3077F SYST BP >= 140 MM HG: CPT | Performed by: STUDENT IN AN ORGANIZED HEALTH CARE EDUCATION/TRAINING PROGRAM

## 2025-07-07 PROCEDURE — 1159F MED LIST DOCD IN RCRD: CPT | Performed by: STUDENT IN AN ORGANIZED HEALTH CARE EDUCATION/TRAINING PROGRAM

## 2025-07-07 PROCEDURE — 1160F RVW MEDS BY RX/DR IN RCRD: CPT | Performed by: STUDENT IN AN ORGANIZED HEALTH CARE EDUCATION/TRAINING PROGRAM

## 2025-07-07 PROCEDURE — 99214 OFFICE O/P EST MOD 30 MIN: CPT | Performed by: STUDENT IN AN ORGANIZED HEALTH CARE EDUCATION/TRAINING PROGRAM

## 2025-07-07 PROCEDURE — 73502 X-RAY EXAM HIP UNI 2-3 VIEWS: CPT | Mod: LEFT SIDE | Performed by: RADIOLOGY

## 2025-07-07 PROCEDURE — 1157F ADVNC CARE PLAN IN RCRD: CPT | Performed by: STUDENT IN AN ORGANIZED HEALTH CARE EDUCATION/TRAINING PROGRAM

## 2025-07-07 PROCEDURE — 3079F DIAST BP 80-89 MM HG: CPT | Performed by: STUDENT IN AN ORGANIZED HEALTH CARE EDUCATION/TRAINING PROGRAM

## 2025-07-07 PROCEDURE — 1126F AMNT PAIN NOTED NONE PRSNT: CPT | Performed by: STUDENT IN AN ORGANIZED HEALTH CARE EDUCATION/TRAINING PROGRAM

## 2025-07-07 PROCEDURE — 99202 OFFICE O/P NEW SF 15 MIN: CPT | Performed by: STUDENT IN AN ORGANIZED HEALTH CARE EDUCATION/TRAINING PROGRAM

## 2025-07-07 PROCEDURE — 99215 OFFICE O/P EST HI 40 MIN: CPT | Performed by: STUDENT IN AN ORGANIZED HEALTH CARE EDUCATION/TRAINING PROGRAM

## 2025-07-07 PROCEDURE — 73502 X-RAY EXAM HIP UNI 2-3 VIEWS: CPT | Mod: LT

## 2025-07-07 ASSESSMENT — PAIN SCALES - GENERAL: PAINLEVEL_OUTOF10: 0-NO PAIN

## 2025-07-07 ASSESSMENT — PAIN - FUNCTIONAL ASSESSMENT: PAIN_FUNCTIONAL_ASSESSMENT: NO/DENIES PAIN

## 2025-07-07 NOTE — PROGRESS NOTES
Cecile Rosa MD   Adult Reconstruction and Joint Replacement Surgery  Phone: [phone]     Fax: [phone]       Name: Levon Mckay  Age: 85 y.o.   : 1940   Date of Visit: 2025    INITIAL CONSULTATION    CC: Left hip pain. PMH of CVA, HTN, Stage 3 CKD, prostate cancer with met to R ilium s/p therapy, presents for follow up after sustaining an acute, minimally displaced Left vancouver Ag periprosthetic fracture on 25 that was treated nonoperatively.     HPI:  This patient presents with 4 weeks of LEFT hip pain. They are status post left hemiarthroplasty approximately 4 years ago    Patient has tried the following Activity modification, Tylenol (arthritis dosing) , Physical therapy, and Xray. Patient does have pain at night. Patient does report falls related to this problem. Patient can walk indoors only. Patient is currently using walker as assistive device. Primarily complains of buttock and lateral hip pain. Patient has difficulty with stairs. The pain is significantly impacting their ability to perform activities of daily living. Patient reports no longer able to do activities such as walking long distances. The patient feels LEFT leg is shorter.    The patient reports no fever, chills or night sweats. No wound drainage. No interval trauma. No recent hospitalizations or infections. Reports recent dental work on 2025.     Implants in place: unable to locate on EMR  Date of initial surgery: unable to locate on EMR  Date of most recent surgery: unable to locate on EMR    Focused History  *Directly transcribed from patient self-reported intake sheet and [facility] Medical Records.      PMH: Reviewed and PE/DVT: hx of stroke  PSH: Reviewed , Hip/Knee replacement: L hemiarthroplasty, Hip/Knee surgery: no, Anesthesia complications: no, Spine surgery: no, Surgical infection: no, and Weight loss surgery: no  SHx: Reviewed, Occupation: Retired, Current smoker: no, EtOH intake weekly: no,  Social support: family, and Preferred physical activities: fishing/walking  Jehovah´s Witness: no  Meds: Reviewed, Current Anticoagulants: ASA 81 mg, Weight loss medication: no, and Current Opioids: no  Allergies: Reviewed  and The patient reports no contraindications or allergies to cephalosporins, aspirin, NSAIDs or opioids, except as noted above.  Dental Hx: Last routine cleaning: June 2025 and All invasive dental work must be completed 3+ months prior to joint replacement surgery. Dental cleaning must be completed 6+ weeks prior to joint replacement surgery. Patient are to avoid any invasive dental work 3-6 months post-surgically.   FH: No family history of any bleeding or clotting disorders.    PROMS/HISTORY   PROMs   No questionnaires on file.     Medical History[1]    Medical History[2]  Documented in chart and reviewed.     Surgical History[3]    Allergies: He has no active allergies.     Medications:  Current Outpatient Medications   Medication Instructions    acetaminophen (TYLENOL) 650 mg, oral, Every 4 hours PRN    amLODIPine (NORVASC) 7.5 mg, oral, Daily    aspirin 81 mg capsule 1 capsule, oral, Every 24 hours    atorvastatin (Lipitor) 20 mg tablet TAKE 1 TABLET BY MOUTH ONCE  DAILY AS DIRECTED    baclofen (LIORESAL) 5 mg, oral, Every 8 hours    baclofen (LIORESAL) 5 mg, oral, Every 8 hours    hydrALAZINE (APRESOLINE) 50 mg, oral, 2 times daily    hydrALAZINE (APRESOLINE) 50 mg, oral, 2 times daily    lisinopril 20 mg, oral, Daily    oxyCODONE (ROXICODONE) 5 mg, oral, Every 4 hours PRN    oxyCODONE (ROXICODONE) 5 mg, oral, Every 4 hours    sertraline (ZOLOFT) 100 mg, oral, Daily    tamsulosin (FLOMAX) 0.4 mg, oral, Daily    trospium (SANCTURA) 20 mg, oral, Daily       Family History[4]  Documented in chart and reviewed.     Social History     Tobacco Use    Smoking status: Never     Passive exposure: Never    Smokeless tobacco: Never   Substance Use Topics    Alcohol use: Not Currently        Review of  Systems: Review of systems completed with medical assistant intake. Please refer to this note.     Physical Exam:  BMI: 23.    General: The patient is well appearing and has an appropriate affect.     Neurological Examination: SILT in SPN/DPN/Sural/Saphenous/Tibial nerves. 5/5 EHL, FHL, Tibial anterior, Gastrocnemius. Coordination grossly intact.     Cardiovascular Exam: Capillary refill <2 seconds.     Lymphatic Examination: There is no obvious lymphatic swelling present around the involved joint.    Skin Exam: Skin around the pertinent joint is without evidence of infection or rash.    Gait: The patient ambulates with a coxalgic gait.     Lumbar spine:    No tenderness to palpation midline    Negative straight leg raise bilaterally    Left Hip Examination:    Examination of the hip reveals the skin to be intact. Previous incision: posterior, well healed.    There is mild tenderness over the greater trochanter.    There is no obvious swelling.    There is a negative Stinchfield test.    Range of motion is: full extension to 105 degrees of flexion.    The hip internally rotates to 10 degrees and externally rotates to 40 degrees.    Abduction is 40 degrees and adduction is 20 degrees.    There is groin and buttock pain with hip motion.    Abductor strength 4/5.    Right Hip Examination:    Examination of the hip reveals the skin to be intact.    There is no tenderness over the greater trochanter.    There is no obvious swelling.    There is a negative Stinchfield test.    Range of motion is full extension to 105 degrees of flexion.    The hip internally rotates to 20 and externally rotates 40 degrees.    Abduction is 40 degrees and adduction is 20 degrees.    There is no groin and buttock pain with hip motion.    Abductor strength 4/5.    Right Knee Examination:  Examination of the right knee reveals the skin to be intact. There is no obvious swelling.    There is no tenderness to palpation.    Range of motion is  "full extension to 120 degrees of flexion.    The knee is stable.    There is no grinding with range of motion.    There is no patellofemoral crepitus.    Left Knee Examination:  Examination of the left knee reveals the skin to be intact. There is no obvious swelling.    There is no tenderness to palpation.    Range of motion is full extension to 120 degrees of flexion.    The knee is stable.    There is no grinding with range of motion.    There is no patellofemoral crepitus.    Prior Labs:   Prior Labs:   Lab Results   Component Value Date    WBC 5.1 06/10/2025    HGB 11.8 (L) 06/10/2025    HCT 36.6 (L) 06/10/2025     (H) 06/10/2025     (L) 06/10/2025      Lab Results   Component Value Date    INR 1.2 (H) 06/01/2025    INR 1.0 01/05/2023    INR 1.1 12/06/2022    PROTIME 13.0 (H) 06/01/2025    PROTIME 11.7 01/05/2023    PROTIME 10.5 12/06/2022         Lab Results   Component Value Date    GLUCOSE 86 06/10/2025    CALCIUM 8.7 06/10/2025     06/10/2025    K 4.8 06/10/2025    CO2 19 (L) 06/10/2025     (H) 06/10/2025    BUN 37 (H) 06/10/2025    CREATININE 1.74 (H) 06/10/2025      No results found for: \"CKTOTAL\", \"CKMB\", \"CKMBINDEX\", \"TROPONINI\"   Lab Results   Component Value Date    HGBA1C 5.1 05/06/2024         No results found for: \"CRP\"   No results found for: \"SEDRATE\"      Radiographs:  Radiographs were personally reviewed today with the patient. There is a primary cemented partial hip arthroplasty in place. Femoral component is in varus alignment and without obvious evidence of hung-implant lucency, osteolysis, fracture or gross failure.  There is a minimally displaced periprosthetic fracture around the greater trochanter.  There is no interval migration of this fracture fragment with repeat films today.    Impression:  This patient presents with several weeks of LEFT hip pain.    Diagnosis:   Left AG periprosthetic femur fracture    Recommendations / Plan:    Clinical findings and " imaging results were reviewed with the patient today.  Patient has a minimally displaced left Ag periprosthetic femur fracture with no interval migration of the fracture fragment with 1 month observation under abductor precautions.  Pain has been minimized with occasional point soreness with abduction.    I have discussed the options in detail with the patient. We have discussed anti-inflammatory medication, activity modification, physical therapy, and revision total hip replacement surgery.  There is no indication for surgery at this time.    Encouraged continued physical therapy.  Encouraged him to continue ambulating with a walker for better gait stability.  Patient may remove abductor precautions at this time.  Patient will continue their home exercise program. Strategies for pain management using over-the-counter anti-inflammatory medications reviewed.  Encouraged them to maintain range of motion and strength around the hip and knee joints.  They will continue to implement these strategies in addressing their pain. The patient verbalizes understanding with the recommendations and treatment plan as outlined above and is in agreement.  Questions were addressed.       _____________  Cecile Rosa MD   Attending Orthopaedic Surgeon  [facility]    [facility]     This office note was transcribed with dictation software.  Please excuse any typographical errors, program misunderstandings leading to inadvertent insertions or deletions of inappropriate wording, pronoun errors and other unintentional transcription errors not noticed on proof-reading.                                [1]   Past Medical History:  Diagnosis Date    Prostate cancer (Multi)     Stroke (Multi)    [2]   Past Medical History:  Diagnosis Date    Prostate cancer (Multi)     Stroke (Multi)    [3]   Past Surgical History:  Procedure Laterality Date    OTHER SURGICAL HISTORY  06/28/2022     Appendectomy    OTHER SURGICAL HISTORY  06/28/2022    Hip surgery    OTHER SURGICAL HISTORY  12/07/2022    Tonsillectomy with adenoidectomy   [4] No family history on file.

## 2025-07-08 ENCOUNTER — HOME CARE VISIT (OUTPATIENT)
Dept: HOME HEALTH SERVICES | Facility: HOME HEALTH | Age: 85
End: 2025-07-08
Payer: MEDICARE

## 2025-07-08 ENCOUNTER — APPOINTMENT (OUTPATIENT)
Dept: HEMATOLOGY/ONCOLOGY | Facility: HOSPITAL | Age: 85
End: 2025-07-08
Payer: MEDICARE

## 2025-07-08 VITALS — OXYGEN SATURATION: 97 % | HEART RATE: 80 BPM | DIASTOLIC BLOOD PRESSURE: 70 MMHG | SYSTOLIC BLOOD PRESSURE: 142 MMHG

## 2025-07-08 PROCEDURE — G0151 HHCP-SERV OF PT,EA 15 MIN: HCPCS | Mod: HHH

## 2025-07-08 SDOH — HEALTH STABILITY: PHYSICAL HEALTH

## 2025-07-08 SDOH — HEALTH STABILITY: PHYSICAL HEALTH: PHYSICAL EXERCISE: SITTING

## 2025-07-08 SDOH — HEALTH STABILITY: PHYSICAL HEALTH: EXERCISE ACTIVITY: ISO ADDUCTION

## 2025-07-08 SDOH — HEALTH STABILITY: PHYSICAL HEALTH: PHYSICAL EXERCISE: 30

## 2025-07-08 SDOH — HEALTH STABILITY: PHYSICAL HEALTH: EXERCISE ACTIVITIES SETS: 3

## 2025-07-08 SDOH — HEALTH STABILITY: PHYSICAL HEALTH: PHYSICAL EXERCISE: STANDING

## 2025-07-08 SDOH — HEALTH STABILITY: PHYSICAL HEALTH: PHYSICAL EXERCISE: 10

## 2025-07-08 SDOH — HEALTH STABILITY: PHYSICAL HEALTH: EXERCISE ACTIVITY: HAMSTRING CURLS

## 2025-07-08 SDOH — HEALTH STABILITY: PHYSICAL HEALTH: EXERCISE ACTIVITY: WEIGHT SHIFTS

## 2025-07-08 SDOH — HEALTH STABILITY: PHYSICAL HEALTH: EXERCISE ACTIVITY: ISO ABDUCTION

## 2025-07-08 SDOH — HEALTH STABILITY: PHYSICAL HEALTH: EXERCISE ACTIVITIES SETS: 1

## 2025-07-08 SDOH — HEALTH STABILITY: PHYSICAL HEALTH: RESISTANCE: NONE GAIT BELT FOR ISO

## 2025-07-08 SDOH — HEALTH STABILITY: PHYSICAL HEALTH: EXERCISE ACTIVITY: LAQ

## 2025-07-08 SDOH — HEALTH STABILITY: PHYSICAL HEALTH: RESISTANCE: 2#

## 2025-07-08 ASSESSMENT — ENCOUNTER SYMPTOMS
DENIES PAIN: 1
PERSON REPORTING PAIN: PATIENT

## 2025-07-09 DIAGNOSIS — I10 ESSENTIAL HYPERTENSION: ICD-10-CM

## 2025-07-09 RX ORDER — AMLODIPINE BESYLATE 10 MG/1
10 TABLET ORAL DAILY
Qty: 90 TABLET | Refills: 1 | Status: SHIPPED | OUTPATIENT
Start: 2025-07-09

## 2025-07-11 ENCOUNTER — HOME CARE VISIT (OUTPATIENT)
Dept: HOME HEALTH SERVICES | Facility: HOME HEALTH | Age: 85
End: 2025-07-11
Payer: MEDICARE

## 2025-07-15 ENCOUNTER — HOME CARE VISIT (OUTPATIENT)
Dept: HOME HEALTH SERVICES | Facility: HOME HEALTH | Age: 85
End: 2025-07-15
Payer: MEDICARE

## 2025-07-17 ENCOUNTER — HOME CARE VISIT (OUTPATIENT)
Dept: HOME HEALTH SERVICES | Facility: HOME HEALTH | Age: 85
End: 2025-07-17
Payer: MEDICARE

## 2025-07-21 ENCOUNTER — HOME CARE VISIT (OUTPATIENT)
Dept: HOME HEALTH SERVICES | Facility: HOME HEALTH | Age: 85
End: 2025-07-21
Payer: MEDICARE

## 2025-07-23 ENCOUNTER — HOME CARE VISIT (OUTPATIENT)
Dept: HOME HEALTH SERVICES | Facility: HOME HEALTH | Age: 85
End: 2025-07-23
Payer: MEDICARE

## 2025-07-23 ENCOUNTER — APPOINTMENT (OUTPATIENT)
Dept: PRIMARY CARE | Facility: CLINIC | Age: 85
End: 2025-07-23
Payer: MEDICARE

## 2025-07-23 VITALS
SYSTOLIC BLOOD PRESSURE: 140 MMHG | BODY MASS INDEX: 21.94 KG/M2 | WEIGHT: 162 LBS | DIASTOLIC BLOOD PRESSURE: 72 MMHG | OXYGEN SATURATION: 97 % | HEIGHT: 72 IN | HEART RATE: 81 BPM

## 2025-07-23 DIAGNOSIS — N18.32 STAGE 3B CHRONIC KIDNEY DISEASE (MULTI): ICD-10-CM

## 2025-07-23 DIAGNOSIS — R13.10 DYSPHAGIA, UNSPECIFIED TYPE: Primary | ICD-10-CM

## 2025-07-23 DIAGNOSIS — R12 HEARTBURN: ICD-10-CM

## 2025-07-23 DIAGNOSIS — I10 ESSENTIAL HYPERTENSION: ICD-10-CM

## 2025-07-23 PROCEDURE — 1160F RVW MEDS BY RX/DR IN RCRD: CPT | Performed by: INTERNAL MEDICINE

## 2025-07-23 PROCEDURE — 99215 OFFICE O/P EST HI 40 MIN: CPT | Performed by: INTERNAL MEDICINE

## 2025-07-23 PROCEDURE — 1159F MED LIST DOCD IN RCRD: CPT | Performed by: INTERNAL MEDICINE

## 2025-07-23 PROCEDURE — 3078F DIAST BP <80 MM HG: CPT | Performed by: INTERNAL MEDICINE

## 2025-07-23 PROCEDURE — 3077F SYST BP >= 140 MM HG: CPT | Performed by: INTERNAL MEDICINE

## 2025-07-23 RX ORDER — FAMOTIDINE 40 MG/1
40 TABLET, FILM COATED ORAL DAILY
Qty: 30 TABLET | Refills: 5 | Status: SHIPPED | OUTPATIENT
Start: 2025-07-23

## 2025-07-23 RX ORDER — AMLODIPINE BESYLATE 5 MG/1
5 TABLET ORAL DAILY
Qty: 90 TABLET | Refills: 3 | Status: SHIPPED | OUTPATIENT
Start: 2025-07-23

## 2025-07-23 RX ORDER — AMLODIPINE BESYLATE 2.5 MG/1
2.5 TABLET ORAL DAILY
Qty: 90 TABLET | Refills: 3 | Status: SHIPPED | OUTPATIENT
Start: 2025-07-23

## 2025-07-23 RX ORDER — AMLODIPINE BESYLATE 5 MG/1
7.5 TABLET ORAL DAILY
COMMUNITY
End: 2025-07-23 | Stop reason: DRUGHIGH

## 2025-07-23 ASSESSMENT — ENCOUNTER SYMPTOMS
COUGH: 1
SHORTNESS OF BREATH: 0
HEARTBURN: 1
DIZZINESS: 0
FATIGUE: 0
HYPERTENSION: 1
CHOKING: 1
ABDOMINAL PAIN: 0

## 2025-07-23 NOTE — PROGRESS NOTES
Subjective   Patient ID: Levon Mckay is a 85 y.o. male who presents for Follow-up chronic medical problems.    Here with family member.  Fell off bar stool 6-2025.  Xrays significant for left nondisplaced periprosthetic fracture.  Treated conservatively.  Ambulating with walker.  No pain meds.  Unable to swallow without difficulty, needs to try 1-3 times.  Has been choking and coughing more.  PT concerned with aspiration.  Records, meds and labs reviewed.    Hypertension  This is a chronic problem. The current episode started more than 1 year ago. The problem has been resolved since onset. The problem is controlled. Pertinent negatives include no chest pain or shortness of breath. The current treatment provides significant improvement. There are no compliance problems.    Heartburn  He complains of choking, coughing, dysphagia and heartburn. He reports no abdominal pain or no chest pain. This is a chronic problem. The current episode started more than 1 year ago. The problem occurs frequently. The problem has been waxing and waning. Pertinent negatives include no fatigue.        Review of Systems   Constitutional:  Negative for fatigue.   Respiratory:  Positive for cough and choking. Negative for shortness of breath.    Cardiovascular:  Negative for chest pain.   Gastrointestinal:  Positive for dysphagia and heartburn. Negative for abdominal pain.   Neurological:  Negative for dizziness.       Objective   /72 (BP Location: Right arm, Patient Position: Sitting)   Pulse 81   Ht 1.829 m (6')   Wt 73.5 kg (162 lb)   SpO2 97%   BMI 21.97 kg/m²     Physical Exam  Constitutional:       Appearance: Normal appearance.     Cardiovascular:      Rate and Rhythm: Normal rate and regular rhythm.      Pulses: Normal pulses.      Heart sounds: Normal heart sounds.   Pulmonary:      Effort: Pulmonary effort is normal.      Breath sounds: Normal breath sounds.     Neurological:      General: No focal deficit present.       Mental Status: He is alert.      Motor: Weakness present.      Gait: Gait abnormal.     Psychiatric:         Mood and Affect: Mood normal.         Behavior: Behavior normal.         Thought Content: Thought content normal.         Judgment: Judgment normal.         Assessment/Plan     Rec modified barium swallow study for dysphagia.  Discussed dietary changes and elevating HOB.  Rec pepcid 40 mg daily.  BP at goal with current medications.  Rec low salt diet.  Check BP at home, goal < 140/90.  GFR estephanie at 38.  Fu 6-8 weeks.    Problem List Items Addressed This Visit           ICD-10-CM    Essential hypertension I10    Relevant Medications    amLODIPine (Norvasc) 5 mg tablet    Stage 3b chronic kidney disease (Multi) N18.32    Relevant Medications    amLODIPine (Norvasc) 5 mg tablet    Other Relevant Orders    FL modified barium swallow study    Heartburn R12    Relevant Medications    famotidine (Pepcid) 40 mg tablet    Dysphagia - Primary R13.10    Relevant Medications    famotidine (Pepcid) 40 mg tablet    Other Relevant Orders    FL modified barium swallow study

## 2025-07-29 ENCOUNTER — HOME CARE VISIT (OUTPATIENT)
Dept: HOME HEALTH SERVICES | Facility: HOME HEALTH | Age: 85
End: 2025-07-29
Payer: MEDICARE

## 2025-07-30 ENCOUNTER — HOSPITAL ENCOUNTER (OUTPATIENT)
Dept: RADIOLOGY | Facility: HOSPITAL | Age: 85
Discharge: HOME | End: 2025-07-30
Payer: MEDICARE

## 2025-07-30 DIAGNOSIS — N18.32 STAGE 3B CHRONIC KIDNEY DISEASE (MULTI): ICD-10-CM

## 2025-07-30 DIAGNOSIS — R13.10 DYSPHAGIA, UNSPECIFIED TYPE: ICD-10-CM

## 2025-07-30 PROCEDURE — 92611 MOTION FLUOROSCOPY/SWALLOW: CPT | Mod: GN

## 2025-07-30 PROCEDURE — 74230 X-RAY XM SWLNG FUNCJ C+: CPT

## 2025-07-30 PROCEDURE — 2500000005 HC RX 250 GENERAL PHARMACY W/O HCPCS: Performed by: INTERNAL MEDICINE

## 2025-07-30 PROCEDURE — 74230 X-RAY XM SWLNG FUNCJ C+: CPT | Performed by: RADIOLOGY

## 2025-07-30 RX ADMIN — BARIUM SULFATE 5 ML: 400 PASTE ORAL at 11:42

## 2025-07-30 RX ADMIN — BARIUM SULFATE 20 ML: 400 SUSPENSION ORAL at 11:43

## 2025-07-30 RX ADMIN — BARIUM SULFATE 700 MG: 700 TABLET ORAL at 11:42

## 2025-07-30 RX ADMIN — BARIUM SULFATE 60 ML: 0.81 POWDER, FOR SUSPENSION ORAL at 11:43

## 2025-07-30 RX ADMIN — BARIUM SULFATE 5 ML: 400 SUSPENSION ORAL at 11:42

## 2025-07-30 NOTE — PROGRESS NOTES
"Speech-Language Pathology    Outpatient Modified Barium Swallow Study    Patient Name: Levon Mckay  MRN: [id number]  : 1940  Today's Date: 25  Time Calculation  Start Time: 1126  Stop Time: 1150  Time Calculation (min): 24 min        Modified Barium Swallow Study completed. Informed verbal consent obtained prior to completion of exam. Trials of thin liquids, mildly/moderately thick liquids, purees, solids, and barium tablet with thin liquids were administered for exam this date. AP view not obtained due to positioning limitations and patient safety/mobility concerns.     SLP: Mikala Dowell, SLP   Contact info: [employer] secure chat    Reason for Referral: C/f aspiration/oropharyngeal dysphagia    Patient Hx: \"CVA, HTN, Stage 3 CKD, prostate cancer with met to R ilium s/p therapy\". Reports post prandial cough given solids and liquids with globus sensation.    Respiratory Status: room air  Current diet: self modified with soft foods    Pain:  Patient exhibits no s/s of pain or discomfort during exam.      Clinical Presentation: Patient arriving with walker and hemiparesis to radiology suite. Patient seated fully upright for exam.  Mental status intact. Patient able to follow commands during exam.    RECOMMENDATIONS:   -EASY CHEW diet (IDDSI Level 7)  -THIN liquids (IDDSI Level 0)  -CAUTION WITH MIXED CONSISTENCIES  -SLP FOR DYSPHAGIA MANAGEMENT     STRATEGIES:  -Upright at 90 degrees for all po intake  -Slow rate of consumption  -Small bolus size  -Reswallow with each bite/sip  -Alternate solids with liquids as needed    SLP PLAN:  Skilled SLP Services: Skilled SLP intervention for dysphagia is warranted.  SLP Frequency: To be determined by treating SLP in outpatient setting  Duration: 1 month  Treatment/Interventions:   - Oropharyngeal exercises  - Bolus trials  - Compensatory strategy training  - Diet tolerance/advancement  - Patient/caregiver education    Discussed POC: patient and " caregiver  Discussed Risks/Benefits: Yes  Patient/Caregiver Agreeable: Yes    Short term goals established 07/30/25:   Patient will tolerate current diet without overt s/s aspiration on 100% of therapeutic trials.    Patient will independently implement compensatory swallowing strategies to reduce risk of aspiration during 90% of therapeutic trials.    Patient will perform oropharyngeal strengthening exercises to improve swallowing function with 100% given min cueing.      Long term goals established 07/30/25:   Patient will tolerate the least restrictive diet without overt s/s aspiration or further pulmonary compromise by time of discharge.      Education Provided: Results and recommendations of MBSS reviewed with patient upon completion of exam. POC discussed at this time with consideration for modification of diet and compensatory swallowing strategies to maximize swallowing safety and efficiency. Verbal understanding and agreement given on all accounts.     Treatment Provided Today: N/A     Additional Medical Consults Suggested:   GI as needed    Repeat Study: Repeat exam if dysphagia symptoms persist or worsen.    Mechanics of the Swallow Summary:  ORAL PHASE:  Lip Closure - Intact  Tongue Control During Bolus Hold - Intact  Bolus prep/mastication - Intact  Bolus transport/lingual motion - Impaired  Oral residue - Absent    PHARYNGEAL PHASE:  Initiation of pharyngeal swallow - Intact  Soft palate elevation - Intact  Laryngeal elevation - Intact  Anterior hyoid excursion - Intact  Epiglottic movement - Intact  Laryngeal vestibule closure - Impaired  Pharyngeal stripping wave - Intact  Pharyngeal contraction (A/P view) - Not tested  Pharyngoesophageal segment opening - Impaired  Tongue base retraction - Impaired  Pharyngeal residue - Present    ESOPHAGEAL PHASE:  Esophageal clearance - Impaired    SLP Impressions with Severity Rating:   Pt presents with mild oropharyngeal dysphagia upon completion of modified  barium swallow study this date. Swallowing physiology is characterized by the above noted deficits. Impairments that most negatively impact swallowing safety and efficiency include reduced bolus formation, decreased TB retraction, and decreased timing of laryngeal vestibule closure. Premature pharyngeal entry viewed with thin and mildly thick liquids extending to piriform sinuses prior to swallow onset. Laryngeal penetration viewed with all liquids consistencies. Deeper entry of thin liquids into laryngeal vestibule noted during continuous drinking tasks with larger bolus sizes. Shallow laryngeal penetration with thin liquids noted as small boluses consumed slowly. Moderately thick liquids noted along laryngeal surface of epiglottis as a result of residue transfer to this area post swallow. Clearance of upper laryngeal vestibule noted following spontaneous reswallow.  No further laryngeal penetration was observed given puree or solid consistencies. No aspiration visualized during the study. Vallecular residue with thin/mildly/moderately thick liquids and solids noted, which did not accumulate and cleared with subsequent swallows. An osteophytic spur viewed along C3-4 impinging into hypopharynx. Barium tablet noted to retain in proximal esophagus below area where osteophytic spur identified. Full clearance of tablet achieved following consumption of additional thin liquid boluses. Implementation of swallowing strategies with reduction in bolus size advised to reduce aspiration risk due to report of post prandial cough primarily with thin liquids.     OUTCOME MEASURES:  Functional Oral Intake Scale  Functional Oral Intake Scale: Level 6        total oral diet with multiple consistencies without special preparations but specific food limitations    EAT-10   0=No problem, 1=Mild problem, 2=Mild to moderate problem, 3=Moderate problem, 4=Severe problem    EAT 10  My swallowing problem has caused me to lose weight.: 0  My  swallowing problem interferes with my ability to go out for meals.: 0  Swallowing liquids takes extra effort.: 1  Swallowing solids takes extra effort.: 0  Swallowing pills takes extra effort.: 1  Swallowing is painful: 0  The pleasure of eating is affected by my swallowing.: 0  When I swallow food sticks in my throat.: 1  I cough when I eat.: 2  Swallowing is stressful: 1  EAT-10 TOTAL SCORE:: 6    A total score of 3 or above may indicate difficulty with swallowing safely and/or efficiently    Rosenbek's Penetration Aspiration Scale    Thin Liquids: 3. PENETRATION with LOW ASPIRATION risk - contrast remains above vocal cords, visible residue]     Nectar Thick Liquids: 3. PENETRATION with LOW ASPIRATION risk - contrast remains above vocal cords, visible residue]     Honey Thick Liquids: 3. PENETRATION with LOW ASPIRATION risk - contrast remains above vocal cords, visible residue]     Puree: 1. NO ASPIRATION & NO PENETRATION - no aspiration, contrast does not enter airway    Solids: 1. NO ASPIRATION & NO PENETRATION - no aspiration, contrast does not enter airway

## 2025-07-31 ENCOUNTER — HOME CARE VISIT (OUTPATIENT)
Dept: HOME HEALTH SERVICES | Facility: HOME HEALTH | Age: 85
End: 2025-07-31
Payer: MEDICARE

## 2025-08-05 ENCOUNTER — HOME CARE VISIT (OUTPATIENT)
Dept: HOME HEALTH SERVICES | Facility: HOME HEALTH | Age: 85
End: 2025-08-05
Payer: MEDICARE

## 2025-08-08 ENCOUNTER — HOME CARE VISIT (OUTPATIENT)
Dept: HOME HEALTH SERVICES | Facility: HOME HEALTH | Age: 85
End: 2025-08-08
Payer: MEDICARE

## 2025-08-08 VITALS — SYSTOLIC BLOOD PRESSURE: 122 MMHG | OXYGEN SATURATION: 98 % | DIASTOLIC BLOOD PRESSURE: 63 MMHG | HEART RATE: 89 BPM

## 2025-08-08 PROCEDURE — G0151 HHCP-SERV OF PT,EA 15 MIN: HCPCS | Mod: HHH

## 2025-08-08 ASSESSMENT — ENCOUNTER SYMPTOMS
HIGHEST PAIN SEVERITY IN PAST 24 HOURS: 0/10
PERSON REPORTING PAIN: PATIENT
DENIES PAIN: 1
PAIN LOCATION - PAIN SEVERITY: 0/10
PAIN LOCATION: LEFT HIP

## 2025-08-08 ASSESSMENT — ACTIVITIES OF DAILY LIVING (ADL)
OASIS_M1830: 02
ENTERING_EXITING_HOME: ONE PERSON

## 2025-08-13 ENCOUNTER — HOME CARE VISIT (OUTPATIENT)
Dept: HOME HEALTH SERVICES | Facility: HOME HEALTH | Age: 85
End: 2025-08-13
Payer: MEDICARE

## 2025-08-18 ENCOUNTER — OFFICE VISIT (OUTPATIENT)
Dept: ORTHOPEDIC SURGERY | Facility: HOSPITAL | Age: 85
End: 2025-08-18
Payer: MEDICARE

## 2025-08-18 ENCOUNTER — HOSPITAL ENCOUNTER (OUTPATIENT)
Dept: RADIOLOGY | Facility: HOSPITAL | Age: 85
Discharge: HOME | End: 2025-08-18
Payer: MEDICARE

## 2025-08-18 DIAGNOSIS — Z96.649 PERIPROSTHETIC FRACTURE OF HIP, SUBSEQUENT ENCOUNTER: ICD-10-CM

## 2025-08-18 DIAGNOSIS — M97.8XXD PERIPROSTHETIC FRACTURE OF HIP, SUBSEQUENT ENCOUNTER: ICD-10-CM

## 2025-08-18 DIAGNOSIS — M97.8XXD PERIPROSTHETIC FRACTURE OF HIP, SUBSEQUENT ENCOUNTER: Primary | ICD-10-CM

## 2025-08-18 DIAGNOSIS — Z96.649 PERIPROSTHETIC FRACTURE OF HIP, SUBSEQUENT ENCOUNTER: Primary | ICD-10-CM

## 2025-08-18 PROCEDURE — 1159F MED LIST DOCD IN RCRD: CPT | Performed by: STUDENT IN AN ORGANIZED HEALTH CARE EDUCATION/TRAINING PROGRAM

## 2025-08-18 PROCEDURE — 73502 X-RAY EXAM HIP UNI 2-3 VIEWS: CPT | Mod: LEFT SIDE | Performed by: RADIOLOGY

## 2025-08-18 PROCEDURE — G2211 COMPLEX E/M VISIT ADD ON: HCPCS | Performed by: STUDENT IN AN ORGANIZED HEALTH CARE EDUCATION/TRAINING PROGRAM

## 2025-08-18 PROCEDURE — 73502 X-RAY EXAM HIP UNI 2-3 VIEWS: CPT | Mod: LT

## 2025-08-18 PROCEDURE — 99202 OFFICE O/P NEW SF 15 MIN: CPT | Performed by: STUDENT IN AN ORGANIZED HEALTH CARE EDUCATION/TRAINING PROGRAM

## 2025-08-18 PROCEDURE — 99215 OFFICE O/P EST HI 40 MIN: CPT | Performed by: STUDENT IN AN ORGANIZED HEALTH CARE EDUCATION/TRAINING PROGRAM

## 2025-08-18 ASSESSMENT — PAIN - FUNCTIONAL ASSESSMENT: PAIN_FUNCTIONAL_ASSESSMENT: NO/DENIES PAIN

## 2025-08-20 ENCOUNTER — HOME CARE VISIT (OUTPATIENT)
Dept: HOME HEALTH SERVICES | Facility: HOME HEALTH | Age: 85
End: 2025-08-20
Payer: MEDICARE

## 2025-08-20 ASSESSMENT — ACTIVITIES OF DAILY LIVING (ADL)
OASIS_M1830: 02
HOME_HEALTH_OASIS: 01

## 2025-09-23 ENCOUNTER — APPOINTMENT (OUTPATIENT)
Dept: PRIMARY CARE | Facility: CLINIC | Age: 85
End: 2025-09-23
Payer: MEDICARE

## 2025-11-07 ENCOUNTER — APPOINTMENT (OUTPATIENT)
Dept: UROLOGY | Facility: CLINIC | Age: 85
End: 2025-11-07
Payer: MEDICARE

## 2025-12-09 ENCOUNTER — APPOINTMENT (OUTPATIENT)
Dept: OPHTHALMOLOGY | Age: 85
End: 2025-12-09
Payer: MEDICARE

## 2026-03-30 ENCOUNTER — APPOINTMENT (OUTPATIENT)
Dept: OTOLARYNGOLOGY | Facility: CLINIC | Age: 86
End: 2026-03-30
Payer: MEDICARE

## 2026-03-30 ENCOUNTER — APPOINTMENT (OUTPATIENT)
Dept: AUDIOLOGY | Facility: CLINIC | Age: 86
End: 2026-03-30
Payer: MEDICARE